# Patient Record
Sex: FEMALE | Race: WHITE | NOT HISPANIC OR LATINO | Employment: OTHER | URBAN - NONMETROPOLITAN AREA
[De-identification: names, ages, dates, MRNs, and addresses within clinical notes are randomized per-mention and may not be internally consistent; named-entity substitution may affect disease eponyms.]

---

## 2017-06-27 ENCOUNTER — TRANSCRIBE ORDERS (OUTPATIENT)
Dept: ADMINISTRATIVE | Facility: HOSPITAL | Age: 67
End: 2017-06-27

## 2017-06-27 DIAGNOSIS — Z13.9 SCREENING: Primary | ICD-10-CM

## 2017-08-07 ENCOUNTER — APPOINTMENT (OUTPATIENT)
Dept: MAMMOGRAPHY | Facility: HOSPITAL | Age: 67
End: 2017-08-07
Attending: INTERNAL MEDICINE

## 2017-08-08 ENCOUNTER — HOSPITAL ENCOUNTER (OUTPATIENT)
Dept: MAMMOGRAPHY | Facility: HOSPITAL | Age: 67
Discharge: HOME | End: 2017-08-08
Attending: INTERNAL MEDICINE | Admitting: INTERNAL MEDICINE

## 2017-08-08 DIAGNOSIS — Z13.9 SCREENING: ICD-10-CM

## 2017-08-08 PROCEDURE — G0202 SCR MAMMO BI INCL CAD: HCPCS

## 2017-08-08 PROCEDURE — 77063 BREAST TOMOSYNTHESIS BI: CPT

## 2017-08-30 ENCOUNTER — HOSPITAL ENCOUNTER (EMERGENCY)
Facility: HOSPITAL | Age: 67
Discharge: HOME | End: 2017-08-30
Attending: EMERGENCY MEDICINE | Admitting: EMERGENCY MEDICINE

## 2017-08-30 VITALS
SYSTOLIC BLOOD PRESSURE: 150 MMHG | TEMPERATURE: 98.4 F | HEART RATE: 87 BPM | RESPIRATION RATE: 20 BRPM | BODY MASS INDEX: 27.97 KG/M2 | WEIGHT: 174 LBS | DIASTOLIC BLOOD PRESSURE: 72 MMHG | OXYGEN SATURATION: 97 % | HEIGHT: 66 IN

## 2017-08-30 DIAGNOSIS — R59.0 CERVICAL LYMPHADENOPATHY: Primary | ICD-10-CM

## 2017-08-30 PROCEDURE — 99283 EMERGENCY DEPT VISIT LOW MDM: CPT

## 2017-08-30 RX ORDER — CEPHALEXIN 500 MG/1
500 CAPSULE ORAL 3 TIMES DAILY
Qty: 21 CAPSULE | Refills: 0 | Status: SHIPPED | OUTPATIENT
Start: 2017-08-30 | End: 2017-09-06

## 2017-08-30 RX ORDER — CEPHALEXIN 250 MG/1
500 CAPSULE ORAL ONCE
Status: COMPLETED | OUTPATIENT
Start: 2017-08-30 | End: 2017-08-30

## 2017-08-30 RX ORDER — ASPIRIN 81 MG/1
81 TABLET, CHEWABLE ORAL DAILY
COMMUNITY

## 2017-08-30 RX ORDER — ATORVASTATIN CALCIUM 10 MG/1
10 TABLET, FILM COATED ORAL DAILY
COMMUNITY
End: 2017-09-11

## 2017-08-30 RX ORDER — CITALOPRAM 10 MG/1
10 TABLET ORAL DAILY
COMMUNITY
End: 2017-11-20 | Stop reason: ALTCHOICE

## 2017-08-30 RX ADMIN — CEPHALEXIN 500 MG: 250 CAPSULE ORAL at 20:27

## 2017-09-07 DIAGNOSIS — R59.1 LYMPHADENOPATHY OF HEAD AND NECK: Primary | ICD-10-CM

## 2017-09-11 ENCOUNTER — OFFICE VISIT (OUTPATIENT)
Dept: SURGERY | Facility: CLINIC | Age: 67
End: 2017-09-11

## 2017-09-11 VITALS
WEIGHT: 177 LBS | HEART RATE: 75 BPM | BODY MASS INDEX: 28.45 KG/M2 | SYSTOLIC BLOOD PRESSURE: 140 MMHG | TEMPERATURE: 97.7 F | HEIGHT: 66 IN | OXYGEN SATURATION: 97 % | DIASTOLIC BLOOD PRESSURE: 78 MMHG

## 2017-09-11 DIAGNOSIS — E04.1 THYROID NODULE: Primary | ICD-10-CM

## 2017-09-11 DIAGNOSIS — E04.1 THYROID NODULE: ICD-10-CM

## 2017-09-11 DIAGNOSIS — N63.0 BREAST MASS: Primary | ICD-10-CM

## 2017-09-11 PROCEDURE — 99204 OFFICE O/P NEW MOD 45 MIN: CPT | Performed by: SURGERY

## 2017-09-11 PROCEDURE — 10022 PR FINE NEEDLE ASP;W/IMAGING GUIDANCE: CPT | Performed by: SURGERY

## 2017-09-11 RX ORDER — CHLORAL HYDRATE 500 MG
CAPSULE ORAL
COMMUNITY
End: 2021-12-06

## 2017-09-11 RX ORDER — ROSUVASTATIN CALCIUM 40 MG/1
40 TABLET, COATED ORAL DAILY
COMMUNITY

## 2017-09-11 RX ORDER — OMEPRAZOLE 20 MG/1
20 CAPSULE, DELAYED RELEASE ORAL DAILY
COMMUNITY
End: 2019-06-07 | Stop reason: HOSPADM

## 2017-09-11 NOTE — PROGRESS NOTES
Patient: Latonya Mei    YOB: 1950    Date: 09/11/2017    Primary Care Provider: Avtar Calzada MD    Reason for Consultation: NECK MASS  Chief complaint:   Chief Complaint   Patient presents with   • Neck Mass     LEFT NECK MASS       Subjective .     History of present illness:  The patient is here today for the evaluation and treatment for a left neck mass. She first noticed a sudden onset of neck swelling a couple of weeks ago.  She went to the ER and they told her it was an enlarged lymph node and put her on antibiotics.  She has not noticed much improvement.  Along with the neck swelling she complains of pressure, pain that radiated to the left ear and left shoulder, sore throat and hoarseness.  Prior to the neck swelling she would feel like she was going to have a muscle spasm in her neck and turning her head to the right would relieve that feeling. Patient also has dense breasts on mammogram, would like to have ultrasound to make sure no underlying masses present.    Review of Systems   Constitutional: Negative for chills, fever and unexpected weight change.   HENT: Positive for sore throat, trouble swallowing and voice change. Negative for hearing loss.    Eyes: Negative for visual disturbance.   Respiratory: Positive for cough and shortness of breath. Negative for apnea, chest tightness and wheezing.    Cardiovascular: Negative for chest pain, palpitations and leg swelling.   Gastrointestinal: Negative for abdominal distention, abdominal pain, anal bleeding, blood in stool, constipation, diarrhea, nausea, rectal pain and vomiting.   Endocrine: Negative for cold intolerance and heat intolerance.   Genitourinary: Negative for difficulty urinating, dysuria and flank pain.   Musculoskeletal: Positive for neck pain and neck stiffness. Negative for back pain and gait problem.   Skin: Negative for color change, rash and wound.   Neurological: Negative for dizziness, syncope, speech  "difficulty, weakness, light-headedness, numbness and headaches.   Hematological: Negative for adenopathy. Does not bruise/bleed easily.   Psychiatric/Behavioral: Negative for confusion. The patient is not nervous/anxious.        Allergies:  Allergies   Allergen Reactions   • Latex Hives     AND ITCHING   • Tuberculin Tests Itching       Medications:    Current Outpatient Prescriptions:   •  aspirin 81 MG chewable tablet, Chew 81 mg Daily., Disp: , Rfl:   •  citalopram (CeleXA) 10 MG tablet, Take 10 mg by mouth Daily., Disp: , Rfl:   •  Omega-3 Fatty Acids (FISH OIL) 1000 MG capsule capsule, Take  by mouth Daily With Breakfast., Disp: , Rfl:   •  omeprazole (priLOSEC) 20 MG capsule, Take 20 mg by mouth Daily., Disp: , Rfl:   •  rosuvastatin (CRESTOR) 40 MG tablet, Take 40 mg by mouth Daily., Disp: , Rfl:   •  VITAMIN D, CHOLECALCIFEROL, PO, Take  by mouth., Disp: , Rfl:     History\"  Past Medical History:   Diagnosis Date   • Arthritis    • Depression    • GERD (gastroesophageal reflux disease)    • Hyperlipidemia        Past Surgical History:   Procedure Laterality Date   • BLADDER SUSPENSION     • HYSTERECTOMY      VAGINAL       Family History   Problem Relation Age of Onset   • Breast cancer Sister    • Breast cancer Other        Social History   Substance Use Topics   • Smoking status: Never Smoker   • Smokeless tobacco: None   • Alcohol use No        Objective     Vital Signs:   /78 (BP Location: Left arm)  Pulse 75  Temp 97.7 °F (36.5 °C) (Temporal Artery )   Ht 66\" (167.6 cm)  Wt 177 lb (80.3 kg)  SpO2 97%  BMI 28.57 kg/m2    Physical Exam:   General Appearance:    Alert, cooperative, in no acute distress   Head:    Normocephalic, without obvious abnormality, atraumatic   Eyes:            Lids and lashes normal, conjunctivae and sclerae normal, no   icterus, no pallor, corneas clear, PERRLA   Ears:    Ears appear intact with no abnormalities noted   Throat:   No oral lesions, no thrush, oral mucosa " moist   Neck:   No adenopathy, supple, trachea midline, no thyromegaly, no   carotid bruit, no JVD.  3 cm left neck mass, moves with swallowing.  Tender.     Lungs:     Clear to auscultation,respirations regular, even and                  unlabored    Heart:    Regular rhythm and normal rate, normal S1 and S2, no            murmur, no gallop, no rub, no click   Chest Wall:    No abnormalities observed   Abdomen:     Normal bowel sounds, no masses, no organomegaly, soft        non-tender, non-distended, no guarding, no rebound                tenderness   Extremities:   Moves all extremities well, no edema, no cyanosis, no             redness   Pulses:   Pulses palpable and equal bilaterally   Skin:   No bleeding, bruising or rash   Lymph nodes:   No palpable adenopathy   Neurologic:   Cranial nerves 2 - 12 grossly intact, sensation intact, DTR       present and equal bilaterally     Results Review:   I reviewed the patient's new clinical results.    Assessment/Plan     1. Breast mass    2. Thyroid nodule        Procedure: FNA left thyroid lobe with ultrasound guidance     I recommend a FNA of the left thyroid lesion. The procedure and risks were clearly explained including bleeding, infection and requirement for re-biopsy and the patient understood these and wishes to proceed.    The patient was brought to the procedure room. Consent and time out were performed. The area was prepped and draped in the usual fashion. 1% lidocaine with epinephrine was infused locally. A 20G needle was used to biopsy the lesion with ultrasound guidance.  Minimal blood loss had occurred and hemostasis had been well controlled with pressure.  The patient tolerated the procedure and there were no complications. We will make a f/u appointment to discuss results.      I discussed the patients findings and my recommendations with patient.  Repeat ultrasound in 3 months of left thyroid.  We will call patient with pathology report and next  week.    Review of Systems was reviewed and confirmed as accurate today.    Electronically signed by Pao John MD  09/11/17      .          Scribed for Pao John MD by Erna Eric. 9/11/2017  2:28 PM

## 2017-09-14 ENCOUNTER — TELEPHONE (OUTPATIENT)
Dept: SURGERY | Facility: CLINIC | Age: 67
End: 2017-09-14

## 2017-09-14 NOTE — TELEPHONE ENCOUNTER
Spoke with Ms. Mei in regards to her recent left thyroid aspiration.  I let her know it showed only blood and there were not any cells in the fluid for diagnosis.  She understood and did not have any questions.  She is going to keep her 3 month follow up appointment scheduled in December and she knows to call if she has any problems between know and then.

## 2017-10-04 ENCOUNTER — OFFICE VISIT (OUTPATIENT)
Dept: SURGERY | Facility: CLINIC | Age: 67
End: 2017-10-04

## 2017-10-04 VITALS
SYSTOLIC BLOOD PRESSURE: 138 MMHG | DIASTOLIC BLOOD PRESSURE: 74 MMHG | WEIGHT: 177 LBS | TEMPERATURE: 97.5 F | HEART RATE: 85 BPM | OXYGEN SATURATION: 98 % | HEIGHT: 66 IN | BODY MASS INDEX: 28.45 KG/M2

## 2017-10-04 DIAGNOSIS — E04.1 THYROID NODULE: Primary | ICD-10-CM

## 2017-10-04 PROCEDURE — 10022 PR FINE NEEDLE ASP;W/IMAGING GUIDANCE: CPT | Performed by: SURGERY

## 2017-10-04 PROCEDURE — 99214 OFFICE O/P EST MOD 30 MIN: CPT | Performed by: SURGERY

## 2017-10-04 RX ORDER — CITALOPRAM 20 MG/1
20 TABLET ORAL DAILY
COMMUNITY
Start: 2017-09-13 | End: 2018-08-17 | Stop reason: ALTCHOICE

## 2017-10-04 NOTE — PROGRESS NOTES
"Patient: Latonya Mei    YOB: 1950    Date: 10/04/2017    Primary Care Provider: Avtar Calzada MD    Chief Complaint:   Chief Complaint   Patient presents with   • Follow-up     Follow up cyst       History: The patient is here for a follow up on her cyst that is increasing in size.  She had an FNA about a month ago on her left thyroid cyst but it has swollen again.  She complains of soreness on her neck, being unable to hear from her left ear, and swelling.    Review of Systems   Constitutional: Negative for chills, fatigue and fever.   Respiratory: Negative for cough.    Cardiovascular: Negative for chest pain.   Gastrointestinal: Negative for abdominal pain, diarrhea, nausea and vomiting.       Vital Signs  /74  Pulse 85  Temp 97.5 °F (36.4 °C) (Temporal Artery )   Ht 66\" (167.6 cm)  Wt 177 lb (80.3 kg)  SpO2 98%  BMI 28.57 kg/m2    Allergies:  Allergies   Allergen Reactions   • Latex Hives     AND ITCHING   • Tuberculin Tests Itching       Medications:    Current Outpatient Prescriptions:   •  aspirin 81 MG chewable tablet, Chew 81 mg Daily., Disp: , Rfl:   •  citalopram (CeleXA) 10 MG tablet, Take 10 mg by mouth Daily., Disp: , Rfl:   •  citalopram (CeleXA) 20 MG tablet, , Disp: , Rfl:   •  Omega-3 Fatty Acids (FISH OIL) 1000 MG capsule capsule, Take  by mouth Daily With Breakfast., Disp: , Rfl:   •  omeprazole (priLOSEC) 20 MG capsule, Take 20 mg by mouth Daily., Disp: , Rfl:   •  rosuvastatin (CRESTOR) 40 MG tablet, Take 40 mg by mouth Daily., Disp: , Rfl:   •  VITAMIN D, CHOLECALCIFEROL, PO, Take  by mouth., Disp: , Rfl:     Physical Exam:   General Appearance:    Alert, cooperative, in no acute distress   Head:    Normocephalic, without obvious abnormality, atraumatic   Lungs:     Clear to auscultation,respirations regular, even and                  unlabored    Heart:    Regular rhythm and normal rate, normal S1 and S2, no            murmur, no gallop, no rub, no click "   Abdomen:     Normal bowel sounds, no masses, no organomegaly, soft        non-tender, non-distended, no guarding, no rebound                tenderness   Extremities:   Moves all extremities well, no edema, no cyanosis, no             redness   Pulses:   Pulses palpable and equal bilaterally   Skin:   No bleeding, bruising or rash      Assessment/Plan     1. Thyroid nodule      Procedure:FNA left thyroid cyst with ultrasound guidance    I recommend a FNA of the left thyroid lesion. The procedure and risks were clearly explained including bleeding, infection and requirement for re-biopsy and the patient understood these and wishes to proceed.    The patient was brought to the procedure room. Consent and time out were performed. The area was prepped and draped in the usual fashion. 1% lidocaine with epinephrine was infused locally. A 20G needle was used to biopsy the lesion with ultrasound guidance.  Minimal blood loss had occurred and hemostasis had been well controlled with pressure.  The patient tolerated the procedure and there were no complications. We will make a f/u appointment to discuss results.    Follow-up 6 months for repeat ultrasound.  If this recurs sooner, she will follow up sooner for drainage.    Electronically signed by Pao John MD  10/04/17    Scribed for Pao John MD by Michelle Milligan. 10/4/2017  11:39 AM

## 2017-11-10 ENCOUNTER — OFFICE VISIT (OUTPATIENT)
Dept: SURGERY | Facility: CLINIC | Age: 67
End: 2017-11-10

## 2017-11-10 VITALS
WEIGHT: 177 LBS | SYSTOLIC BLOOD PRESSURE: 136 MMHG | BODY MASS INDEX: 28.45 KG/M2 | HEART RATE: 78 BPM | TEMPERATURE: 97.8 F | HEIGHT: 66 IN | OXYGEN SATURATION: 97 % | DIASTOLIC BLOOD PRESSURE: 70 MMHG

## 2017-11-10 DIAGNOSIS — E04.1 THYROID CYST: Primary | ICD-10-CM

## 2017-11-10 DIAGNOSIS — E04.1 THYROID NODULE: Primary | ICD-10-CM

## 2017-11-10 PROCEDURE — 99214 OFFICE O/P EST MOD 30 MIN: CPT | Performed by: SURGERY

## 2017-11-10 PROCEDURE — 10022 PR FINE NEEDLE ASP;W/IMAGING GUIDANCE: CPT | Performed by: SURGERY

## 2017-11-10 NOTE — PROGRESS NOTES
"Patient: Latonya Mei    YOB: 1950    Date: 11/10/2017    Primary Care Provider: Avtar Calzada MD    Chief Complaint:   Chief Complaint   Patient presents with   • Follow-up     thyroid nodule       History: I saw the patient in the office today for a follow up of her left thyroid lesion. She had an FNA done on 10/04/17. She states it started filling back up again shortly after. She complains of difficulty swallowing. She denies pain and skin discoloration.Lesion has recurred 3 times the last 3 months, hemorrhagic in nature.Symptomatic with hoarseness, difficulty swallowing and pressure when she lays flat.  She comes in today for FNA and to schedule excision of the left thyroid nodule.    Review of Systems   Constitutional: Negative for chills, fatigue and fever.   HENT: Positive for trouble swallowing.    Respiratory: Negative for cough.    Cardiovascular: Negative for chest pain.   Gastrointestinal: Negative for abdominal pain, diarrhea, nausea and vomiting.       Vital Signs  /70  Pulse 78  Temp 97.8 °F (36.6 °C) (Temporal Artery )   Ht 66\" (167.6 cm)  Wt 177 lb (80.3 kg)  SpO2 97%  BMI 28.57 kg/m2    Allergies:  Allergies   Allergen Reactions   • Latex Hives     AND ITCHING   • Tuberculin Tests Itching       Medications:    Current Outpatient Prescriptions:   •  aspirin 81 MG chewable tablet, Chew 81 mg Daily., Disp: , Rfl:   •  citalopram (CeleXA) 10 MG tablet, Take 10 mg by mouth Daily., Disp: , Rfl:   •  citalopram (CeleXA) 20 MG tablet, , Disp: , Rfl:   •  Omega-3 Fatty Acids (FISH OIL) 1000 MG capsule capsule, Take  by mouth Daily With Breakfast., Disp: , Rfl:   •  omeprazole (priLOSEC) 20 MG capsule, Take 20 mg by mouth Daily., Disp: , Rfl:   •  rosuvastatin (CRESTOR) 40 MG tablet, Take 40 mg by mouth Daily., Disp: , Rfl:   •  VITAMIN D, CHOLECALCIFEROL, PO, Take  by mouth., Disp: , Rfl:     Physical Exam:   General Appearance:    Alert, cooperative, in no acute " distress   Head:    Normocephalic, without obvious abnormality, atraumatic   Lungs:     Clear to auscultation,respirations regular, even and                  unlabored    Heart:    Regular rhythm and normal rate, normal S1 and S2, no            murmur, no gallop, no rub, no click   Abdomen:     Normal bowel sounds, no masses, no organomegaly, soft        non-tender, non-distended, no guarding, no rebound                tenderness   Extremities:   Moves all extremities well, no edema, no cyanosis, no             redness   Pulses:   Pulses palpable and equal bilaterally   Skin:   No bleeding, bruising or rash      Assessment/Plan     1. Thyroid nodule      Procedure: FNA left thyroid cyst with ultrasound guidance    I recommend a FNA of the left thyroid lesion. The procedure and risks were clearly explained including bleeding, infection and requirement for re-biopsy and the patient understood these and wishes to proceed.    The patient was brought to the procedure room. Consent and time out were performed. The area was prepped and draped in the usual fashion. 1% lidocaine with epinephrine was infused locally. A 20G needle was used to biopsy the lesion with ultrasound guidance.  Minimal blood loss had occurred and hemostasis had been well controlled with pressure.  The patient tolerated the procedure and there were no complications. We will make a f/u appointment to discuss results.      Patient scheduled for left thyroid lobectomy for recurrent hemorrhagic left thyroid cyst.  Symptomatic with dysphagia, shortness of breath and hoarseness.  Risk of bleeding, infection and possible recurrent laryngeal nerve injury discussed and patient agreeable.    Electronically signed by Pao John MD  11/10/17    Scribed for Pao John MD by Anisa Soto. 11/10/2017  3:51 PM

## 2017-11-13 ENCOUNTER — TELEPHONE (OUTPATIENT)
Dept: SURGERY | Facility: CLINIC | Age: 67
End: 2017-11-13

## 2017-11-13 NOTE — TELEPHONE ENCOUNTER
Informed patient that her FNA of the thyroid was negative. She understood and did not have any questions.

## 2017-11-14 ENCOUNTER — TRANSCRIBE ORDERS (OUTPATIENT)
Dept: ENDOCRINOLOGY | Facility: CLINIC | Age: 67
End: 2017-11-14

## 2017-11-14 DIAGNOSIS — E04.1 THYROID NODULE: Primary | ICD-10-CM

## 2017-11-20 ENCOUNTER — OFFICE VISIT (OUTPATIENT)
Dept: ENDOCRINOLOGY | Facility: CLINIC | Age: 67
End: 2017-11-20

## 2017-11-20 VITALS
HEIGHT: 66 IN | BODY MASS INDEX: 28.77 KG/M2 | HEART RATE: 70 BPM | SYSTOLIC BLOOD PRESSURE: 136 MMHG | OXYGEN SATURATION: 98 % | WEIGHT: 179 LBS | DIASTOLIC BLOOD PRESSURE: 80 MMHG

## 2017-11-20 DIAGNOSIS — E04.1 THYROID NODULE: Primary | ICD-10-CM

## 2017-11-20 LAB
ALBUMIN SERPL-MCNC: 4.3 G/DL (ref 3.2–4.8)
ALBUMIN/GLOB SERPL: 1.9 G/DL (ref 1.5–2.5)
ALP SERPL-CCNC: 59 U/L (ref 25–100)
ALT SERPL W P-5'-P-CCNC: 17 U/L (ref 7–40)
ANION GAP SERPL CALCULATED.3IONS-SCNC: 3 MMOL/L (ref 3–11)
AST SERPL-CCNC: 17 U/L (ref 0–33)
BASOPHILS # BLD AUTO: 0.03 10*3/MM3 (ref 0–0.2)
BASOPHILS NFR BLD AUTO: 0.5 % (ref 0–1)
BILIRUB SERPL-MCNC: 0.7 MG/DL (ref 0.3–1.2)
BUN BLD-MCNC: 16 MG/DL (ref 9–23)
BUN/CREAT SERPL: 17.8 (ref 7–25)
CALCIUM SPEC-SCNC: 9.2 MG/DL (ref 8.7–10.4)
CHLORIDE SERPL-SCNC: 107 MMOL/L (ref 99–109)
CO2 SERPL-SCNC: 31 MMOL/L (ref 20–31)
CREAT BLD-MCNC: 0.9 MG/DL (ref 0.6–1.3)
DEPRECATED RDW RBC AUTO: 46.7 FL (ref 37–54)
EOSINOPHIL # BLD AUTO: 0.11 10*3/MM3 (ref 0–0.3)
EOSINOPHIL NFR BLD AUTO: 1.7 % (ref 0–3)
ERYTHROCYTE [DISTWIDTH] IN BLOOD BY AUTOMATED COUNT: 13.5 % (ref 11.3–14.5)
GFR SERPL CREATININE-BSD FRML MDRD: 62 ML/MIN/1.73
GLOBULIN UR ELPH-MCNC: 2.3 GM/DL
GLUCOSE BLD-MCNC: 138 MG/DL (ref 70–100)
HCT VFR BLD AUTO: 39.9 % (ref 34.5–44)
HGB BLD-MCNC: 12.8 G/DL (ref 11.5–15.5)
IMM GRANULOCYTES # BLD: 0.01 10*3/MM3 (ref 0–0.03)
IMM GRANULOCYTES NFR BLD: 0.2 % (ref 0–0.6)
LYMPHOCYTES # BLD AUTO: 2.03 10*3/MM3 (ref 0.6–4.8)
LYMPHOCYTES NFR BLD AUTO: 32.3 % (ref 24–44)
MCH RBC QN AUTO: 30.1 PG (ref 27–31)
MCHC RBC AUTO-ENTMCNC: 32.1 G/DL (ref 32–36)
MCV RBC AUTO: 93.9 FL (ref 80–99)
MONOCYTES # BLD AUTO: 0.49 10*3/MM3 (ref 0–1)
MONOCYTES NFR BLD AUTO: 7.8 % (ref 0–12)
NEUTROPHILS # BLD AUTO: 3.62 10*3/MM3 (ref 1.5–8.3)
NEUTROPHILS NFR BLD AUTO: 57.5 % (ref 41–71)
PLATELET # BLD AUTO: 251 10*3/MM3 (ref 150–450)
PMV BLD AUTO: 10.6 FL (ref 6–12)
POTASSIUM BLD-SCNC: 5.1 MMOL/L (ref 3.5–5.5)
PROT SERPL-MCNC: 6.6 G/DL (ref 5.7–8.2)
RBC # BLD AUTO: 4.25 10*6/MM3 (ref 3.89–5.14)
SODIUM BLD-SCNC: 141 MMOL/L (ref 132–146)
TSH SERPL DL<=0.05 MIU/L-ACNC: 2.83 MIU/ML (ref 0.35–5.35)
WBC NRBC COR # BLD: 6.29 10*3/MM3 (ref 3.5–10.8)

## 2017-11-20 PROCEDURE — 99204 OFFICE O/P NEW MOD 45 MIN: CPT | Performed by: INTERNAL MEDICINE

## 2017-11-20 PROCEDURE — 80053 COMPREHEN METABOLIC PANEL: CPT | Performed by: INTERNAL MEDICINE

## 2017-11-20 PROCEDURE — 85025 COMPLETE CBC W/AUTO DIFF WBC: CPT | Performed by: INTERNAL MEDICINE

## 2017-11-20 PROCEDURE — 84443 ASSAY THYROID STIM HORMONE: CPT | Performed by: INTERNAL MEDICINE

## 2017-11-20 NOTE — PROGRESS NOTES
Thyroid Problem (New pt for cysts on thyroid. Would like to discuss surgery options)    Subjective    Latonya Mei is a 67 y.o. female. she is being seen for consultation today at the request of  Avtar Calzada MD   Patient presented for second opinion regarding thyroid surgery. Patient had fine-needle aspiration of the thyroid nodule in 9/11/2017, 10/04/2017 and 11/07/2017. The cytology showed hemorrhagic cystic fluid, which recollected several times.     Left thyroid lobectomy was recommended for the cystic nodule in the left thyroid measured 4 x 3 cm cytology from 09/11/2017 showed nondiagnostic specimen with only block present and no follicular cells.  Patient would like to establish endocrinology care and hear a second opinion about the surgery indications.   She has reported that swallowing problem associated with the nodule, hoarseness of the voice, likely seasonal allergies.     She does not have a history of radiation treatment, no family history of thyroid disease or thyroid cancer. Several of her sisters have breast cancer, also lung cancer and brain cancer in the family.  Her thyroid function was normal by a last evaluation. No thyroid labs within a year    History of Present Illness    Review of Systems  Review of Systems   Constitutional: Positive for fatigue and unexpected weight change (weight gain). Negative for activity change, appetite change, chills and diaphoresis.   HENT: Positive for sore throat (increased after 3d Cyst aspiration), trouble swallowing and voice change (Hoarseness). Negative for congestion, ear pain, facial swelling, hearing loss and postnasal drip.    Eyes: Negative.  Negative for redness, itching and visual disturbance.   Respiratory: Negative for cough and shortness of breath.    Cardiovascular: Negative for chest pain, palpitations and leg swelling.   Gastrointestinal: Negative for abdominal distention, abdominal pain, constipation, diarrhea, nausea and vomiting.    Endocrine:        As listed in HPI   Genitourinary: Negative.    Musculoskeletal: Positive for arthralgias. Negative for back pain, joint swelling, myalgias, neck pain and neck stiffness.   Skin: Negative.    Allergic/Immunologic: Negative.    Neurological: Positive for numbness. Negative for dizziness, tremors, syncope, weakness, light-headedness and headaches.   Hematological: Positive for adenopathy.   Psychiatric/Behavioral: Negative.    All other systems reviewed and are negative.    Current medications:  Current Outpatient Prescriptions   Medication Sig Dispense Refill   • aspirin 81 MG chewable tablet Chew 81 mg Daily.     • citalopram (CeleXA) 20 MG tablet      • Omega-3 Fatty Acids (FISH OIL) 1000 MG capsule capsule Take  by mouth Daily With Breakfast.     • omeprazole (priLOSEC) 20 MG capsule Take 20 mg by mouth Daily.     • rosuvastatin (CRESTOR) 40 MG tablet Take 40 mg by mouth Daily.     • VITAMIN D, CHOLECALCIFEROL, PO Take  by mouth.       No current facility-administered medications for this visit.        The following portions of the patient's history were reviewed and updated as appropriate: She  has a past medical history of Arthritis; Arthritis; Depression; Diverticulitis; Fibromyalgia; GERD (gastroesophageal reflux disease); Hyperlipidemia; and Scoliosis.  She  has a past surgical history that includes Hysterectomy; Bladder suspension; Tubal ligation; and Carpal tunnel release.  Her family history includes Arthritis in her father, mother, and sister; Brain cancer in her brother; Breast cancer in her other and sister; Cancer in her sister; Diabetes in her brother and sister; Heart attack in her mother; Lung cancer in her father; Osteoporosis in her mother; Prostate cancer in her father.  She  reports that she has never smoked. She does not have any smokeless tobacco history on file. She reports that she does not drink alcohol or use illicit drugs.  She is allergic to latex and tuberculin  "tests..        Objective      Vitals:    11/20/17 0815   BP: 136/80   Pulse: 70   SpO2: 98%   Weight: 179 lb (81.2 kg)   Height: 66\" (167.6 cm)   Body mass index is 28.89 kg/(m^2).  Physical Exam   Constitutional: She is oriented to person, place, and time. She appears well-developed and well-nourished.   HENT:   Head: Normocephalic and atraumatic.   Mouth/Throat: Oropharynx is clear and moist.   Eyes: Conjunctivae are normal.   Neck: Normal range of motion. No muscular tenderness present. Carotid bruit is not present. Thyromegaly (left 3-4 cm soft thyroid nodule palpated ) present. No thyroid mass present.   The neck is supple and no assimetry visualized   Cardiovascular: Normal rate, regular rhythm and normal heart sounds.    Pulmonary/Chest: Effort normal and breath sounds normal.   Musculoskeletal: She exhibits no edema.   Lymphadenopathy:     She has no cervical adenopathy.   Neurological: She is alert and oriented to person, place, and time.   Skin: Skin is warm. No rash noted.   Psychiatric: She has a normal mood and affect. Thought content normal.   Vitals reviewed.      LABS AND IMAGING      Assessment/Plan       Problem List Items Addressed This Visit        Endocrine    Thyroid nodule - Primary    Relevant Orders    TSH    Comprehensive Metabolic Panel    CBC Auto Differential             PLAN  1. Left thyroid cyst - bedside ultrasound revealed simple cyst. No abnormal intranodular flow on Doppler. She has a small 4 x 6 mm l/node in the level 3 on the left, likely reactive l/node. Right thyroid lobe has normal echogenicity with 2 x 3 mm cystic nodule.     2. diagnosis was discussed with the patient, and her questions were answered. I agree with dr Cary that Left thyroid lobectomy is the best approach. Cytology didn't reveal atypical cells and u/s picture is consistent with the same.  I have gone over the postoperative care. Most likely she will not require levothyroxine replacement as her right lobe " appears healthy. Also discussed the need for completion thyroidectomy in the event of cancerous cells on surgical pathology.     3. I will see patient in follow-up 6-8 weeks after the surgery and will make recommendations regarding the need for levothyroxine replacement.     4. Test and referrals ordered:  Orders Placed This Encounter   Procedures   • TSH   • Comprehensive Metabolic Panel   • CBC Auto Differential       Follow-up:  2 months    31     min  of  45 min face-to-face visit time spent for coordination of care and counselling regarding identified problems as outlined in the objective, assessment and discussion portions of the documentation.

## 2017-11-27 ENCOUNTER — APPOINTMENT (OUTPATIENT)
Dept: PREADMISSION TESTING | Facility: HOSPITAL | Age: 67
End: 2017-11-27

## 2017-11-27 VITALS — HEIGHT: 66 IN | BODY MASS INDEX: 28.61 KG/M2 | WEIGHT: 178 LBS

## 2017-11-27 PROCEDURE — 93005 ELECTROCARDIOGRAM TRACING: CPT | Performed by: SURGERY

## 2017-12-05 ENCOUNTER — ANESTHESIA EVENT (OUTPATIENT)
Dept: PERIOP | Facility: HOSPITAL | Age: 67
End: 2017-12-05

## 2017-12-06 ENCOUNTER — ANESTHESIA (OUTPATIENT)
Dept: PERIOP | Facility: HOSPITAL | Age: 67
End: 2017-12-06

## 2017-12-06 ENCOUNTER — HOSPITAL ENCOUNTER (OUTPATIENT)
Facility: HOSPITAL | Age: 67
Setting detail: HOSPITAL OUTPATIENT SURGERY
Discharge: HOME | End: 2017-12-06
Attending: SURGERY | Admitting: SURGERY

## 2017-12-06 VITALS
OXYGEN SATURATION: 95 % | TEMPERATURE: 98.2 F | RESPIRATION RATE: 18 BRPM | HEART RATE: 77 BPM | DIASTOLIC BLOOD PRESSURE: 65 MMHG | SYSTOLIC BLOOD PRESSURE: 155 MMHG

## 2017-12-06 DIAGNOSIS — E04.1 THYROID NODULE: ICD-10-CM

## 2017-12-06 PROCEDURE — 25010000003 CEFAZOLIN PER 500 MG: Performed by: SURGERY

## 2017-12-06 PROCEDURE — 88307 TISSUE EXAM BY PATHOLOGIST: CPT | Performed by: SURGERY

## 2017-12-06 PROCEDURE — 25010000002 ONDANSETRON PER 1 MG: Performed by: NURSE ANESTHETIST, CERTIFIED REGISTERED

## 2017-12-06 PROCEDURE — 25010000002 SUCCINYLCHOLINE PER 20 MG: Performed by: NURSE ANESTHETIST, CERTIFIED REGISTERED

## 2017-12-06 PROCEDURE — 25010000002 MIDAZOLAM PER 1 MG: Performed by: NURSE ANESTHETIST, CERTIFIED REGISTERED

## 2017-12-06 PROCEDURE — 25010000002 FENTANYL CITRATE (PF) 250 MCG/5ML SOLUTION: Performed by: NURSE ANESTHETIST, CERTIFIED REGISTERED

## 2017-12-06 PROCEDURE — 25010000002 DEXAMETHASONE PER 1 MG: Performed by: NURSE ANESTHETIST, CERTIFIED REGISTERED

## 2017-12-06 PROCEDURE — 60220 PARTIAL REMOVAL OF THYROID: CPT | Performed by: SURGERY

## 2017-12-06 RX ORDER — FENTANYL CITRATE 50 UG/ML
INJECTION, SOLUTION INTRAMUSCULAR; INTRAVENOUS AS NEEDED
Status: DISCONTINUED | OUTPATIENT
Start: 2017-12-06 | End: 2017-12-06 | Stop reason: SURG

## 2017-12-06 RX ORDER — ATROPINE SULFATE 0.4 MG/ML
AMPUL (ML) INJECTION AS NEEDED
Status: DISCONTINUED | OUTPATIENT
Start: 2017-12-06 | End: 2017-12-06 | Stop reason: SURG

## 2017-12-06 RX ORDER — MEPERIDINE HYDROCHLORIDE 25 MG/ML
25 INJECTION INTRAMUSCULAR; INTRAVENOUS; SUBCUTANEOUS
Status: DISCONTINUED | OUTPATIENT
Start: 2017-12-06 | End: 2017-12-06 | Stop reason: HOSPADM

## 2017-12-06 RX ORDER — MIDAZOLAM HYDROCHLORIDE 1 MG/ML
INJECTION INTRAMUSCULAR; INTRAVENOUS AS NEEDED
Status: DISCONTINUED | OUTPATIENT
Start: 2017-12-06 | End: 2017-12-06 | Stop reason: SURG

## 2017-12-06 RX ORDER — SODIUM CHLORIDE, SODIUM LACTATE, POTASSIUM CHLORIDE, CALCIUM CHLORIDE 600; 310; 30; 20 MG/100ML; MG/100ML; MG/100ML; MG/100ML
1000 INJECTION, SOLUTION INTRAVENOUS CONTINUOUS PRN
Status: DISCONTINUED | OUTPATIENT
Start: 2017-12-06 | End: 2017-12-06 | Stop reason: HOSPADM

## 2017-12-06 RX ORDER — MEPERIDINE HYDROCHLORIDE 50 MG/ML
INJECTION INTRAMUSCULAR; INTRAVENOUS; SUBCUTANEOUS AS NEEDED
Status: DISCONTINUED | OUTPATIENT
Start: 2017-12-06 | End: 2017-12-06 | Stop reason: SURG

## 2017-12-06 RX ORDER — ONDANSETRON 2 MG/ML
4 INJECTION INTRAMUSCULAR; INTRAVENOUS ONCE
Status: DISCONTINUED | OUTPATIENT
Start: 2017-12-06 | End: 2017-12-06 | Stop reason: HOSPADM

## 2017-12-06 RX ORDER — SUCCINYLCHOLINE CHLORIDE 20 MG/ML
INJECTION INTRAMUSCULAR; INTRAVENOUS AS NEEDED
Status: DISCONTINUED | OUTPATIENT
Start: 2017-12-06 | End: 2017-12-06 | Stop reason: SURG

## 2017-12-06 RX ORDER — PROMETHAZINE HYDROCHLORIDE 25 MG/ML
6.25 INJECTION, SOLUTION INTRAMUSCULAR; INTRAVENOUS ONCE
Status: DISCONTINUED | OUTPATIENT
Start: 2017-12-06 | End: 2017-12-06 | Stop reason: HOSPADM

## 2017-12-06 RX ORDER — HYDROCODONE BITARTRATE AND ACETAMINOPHEN 5; 325 MG/1; MG/1
1-2 TABLET ORAL EVERY 4 HOURS PRN
Qty: 20 TABLET | Refills: 0 | Status: SHIPPED | OUTPATIENT
Start: 2017-12-06 | End: 2018-02-02

## 2017-12-06 RX ORDER — ACETAMINOPHEN 325 MG/1
650 TABLET ORAL ONCE
Status: COMPLETED | OUTPATIENT
Start: 2017-12-06 | End: 2017-12-06

## 2017-12-06 RX ORDER — SODIUM CHLORIDE 0.9 % (FLUSH) 0.9 %
3 SYRINGE (ML) INJECTION AS NEEDED
Status: DISCONTINUED | OUTPATIENT
Start: 2017-12-06 | End: 2017-12-06 | Stop reason: HOSPADM

## 2017-12-06 RX ORDER — GLYCOPYRROLATE 0.2 MG/ML
INJECTION INTRAMUSCULAR; INTRAVENOUS AS NEEDED
Status: DISCONTINUED | OUTPATIENT
Start: 2017-12-06 | End: 2017-12-06 | Stop reason: SURG

## 2017-12-06 RX ORDER — ACETAMINOPHEN 325 MG/1
TABLET ORAL
Status: COMPLETED
Start: 2017-12-06 | End: 2017-12-06

## 2017-12-06 RX ORDER — OXYCODONE AND ACETAMINOPHEN 10; 325 MG/1; MG/1
1 TABLET ORAL ONCE
Status: COMPLETED | OUTPATIENT
Start: 2017-12-06 | End: 2017-12-06

## 2017-12-06 RX ORDER — DEXAMETHASONE SODIUM PHOSPHATE 4 MG/ML
INJECTION, SOLUTION INTRA-ARTICULAR; INTRALESIONAL; INTRAMUSCULAR; INTRAVENOUS; SOFT TISSUE AS NEEDED
Status: DISCONTINUED | OUTPATIENT
Start: 2017-12-06 | End: 2017-12-06 | Stop reason: SURG

## 2017-12-06 RX ORDER — OXYCODONE AND ACETAMINOPHEN 10; 325 MG/1; MG/1
TABLET ORAL
Status: COMPLETED
Start: 2017-12-06 | End: 2017-12-06

## 2017-12-06 RX ORDER — ROCURONIUM BROMIDE 10 MG/ML
INJECTION, SOLUTION INTRAVENOUS AS NEEDED
Status: DISCONTINUED | OUTPATIENT
Start: 2017-12-06 | End: 2017-12-06 | Stop reason: SURG

## 2017-12-06 RX ORDER — ONDANSETRON 2 MG/ML
INJECTION INTRAMUSCULAR; INTRAVENOUS AS NEEDED
Status: DISCONTINUED | OUTPATIENT
Start: 2017-12-06 | End: 2017-12-06 | Stop reason: SURG

## 2017-12-06 RX ADMIN — OXYCODONE HYDROCHLORIDE AND ACETAMINOPHEN 1 TABLET: 10; 325 TABLET ORAL at 07:06

## 2017-12-06 RX ADMIN — MIDAZOLAM HYDROCHLORIDE 2 MG: 1 INJECTION, SOLUTION INTRAMUSCULAR; INTRAVENOUS at 07:17

## 2017-12-06 RX ADMIN — CEFAZOLIN SODIUM 2 G: 2 SOLUTION INTRAVENOUS at 07:17

## 2017-12-06 RX ADMIN — DEXAMETHASONE SODIUM PHOSPHATE 4 MG: 4 INJECTION, SOLUTION INTRAMUSCULAR; INTRAVENOUS at 07:54

## 2017-12-06 RX ADMIN — ACETAMINOPHEN 650 MG: 325 TABLET, FILM COATED ORAL at 07:05

## 2017-12-06 RX ADMIN — SODIUM CHLORIDE, POTASSIUM CHLORIDE, SODIUM LACTATE AND CALCIUM CHLORIDE: 600; 310; 30; 20 INJECTION, SOLUTION INTRAVENOUS at 08:05

## 2017-12-06 RX ADMIN — FENTANYL CITRATE 100 MCG: 50 INJECTION, SOLUTION INTRAMUSCULAR; INTRAVENOUS at 07:33

## 2017-12-06 RX ADMIN — SUCCINYLCHOLINE CHLORIDE 80 MG: 20 INJECTION, SOLUTION INTRAMUSCULAR; INTRAVENOUS at 07:33

## 2017-12-06 RX ADMIN — FENTANYL CITRATE 50 MCG: 50 INJECTION, SOLUTION INTRAMUSCULAR; INTRAVENOUS at 07:45

## 2017-12-06 RX ADMIN — OXYCODONE AND ACETAMINOPHEN 1 TABLET: 10; 325 TABLET ORAL at 07:06

## 2017-12-06 RX ADMIN — ACETAMINOPHEN 650 MG: 325 TABLET ORAL at 07:05

## 2017-12-06 RX ADMIN — FENTANYL CITRATE 25 MCG: 50 INJECTION, SOLUTION INTRAMUSCULAR; INTRAVENOUS at 08:06

## 2017-12-06 RX ADMIN — ATROPINE SULFATE 0.4 MG: 0.4 INJECTION, SOLUTION INTRAMUSCULAR; INTRAVENOUS; SUBCUTANEOUS at 07:50

## 2017-12-06 RX ADMIN — ONDANSETRON 4 MG: 2 INJECTION INTRAMUSCULAR; INTRAVENOUS at 07:54

## 2017-12-06 RX ADMIN — MEPERIDINE HYDROCHLORIDE 25 MG: 50 INJECTION INTRAMUSCULAR; INTRAVENOUS; SUBCUTANEOUS at 08:30

## 2017-12-06 RX ADMIN — SODIUM CHLORIDE, POTASSIUM CHLORIDE, SODIUM LACTATE AND CALCIUM CHLORIDE 1000 ML: 600; 310; 30; 20 INJECTION, SOLUTION INTRAVENOUS at 07:00

## 2017-12-06 RX ADMIN — ROCURONIUM BROMIDE 5 MG: 10 INJECTION INTRAVENOUS at 07:33

## 2017-12-06 RX ADMIN — GLYCOPYRROLATE 0.2 MG: 0.2 INJECTION, SOLUTION INTRAMUSCULAR; INTRAVENOUS at 07:47

## 2017-12-06 RX ADMIN — FENTANYL CITRATE 25 MCG: 50 INJECTION, SOLUTION INTRAMUSCULAR; INTRAVENOUS at 08:00

## 2017-12-06 NOTE — ANESTHESIA PROCEDURE NOTES
Airway  Urgency: elective    Airway not difficult    General Information and Staff    Patient location during procedure: OR  CRNA: BIBI BISHOP    Indications and Patient Condition  Indications for airway management: airway protection    Preoxygenated: yes  Mask difficulty assessment: 1 - vent by mask    Final Airway Details  Final airway type: endotracheal airway      Successful airway: ETT  Cuffed: yes   Successful intubation technique: direct laryngoscopy  Facilitating devices/methods: intubating stylet  Endotracheal tube insertion site: oral  Blade: Cortez  Blade size: #2  ETT size: 7.0 mm  Cormack-Lehane Classification: grade I - full view of glottis  Placement verified by: chest auscultation and capnometry   Measured from: lips  ETT to lips (cm): 22  Number of attempts at approach: 1    Additional Comments  Airway placed without problems. Dentition and Lips as pre-induction. ETT cuff inflated to minimal occlusive pressure.

## 2017-12-06 NOTE — PLAN OF CARE
Problem: Patient Care Overview (Adult)  Goal: Plan of Care Review  Outcome: Ongoing (interventions implemented as appropriate)    12/06/17 1001   Outcome Evaluation   Outcome Summary/Follow up Plan pacu discharge criteria met.

## 2017-12-06 NOTE — NURSING NOTE
0840- TRACH TRAY WITH PT TO PACU, SPEECH CLEAR. DRSG CD/I, NACK WITHOUT SWELLING NOTED.  0900- DR. STEIN INTO PACU SPOKE WITH PT. DENIES COMPLAINTS.

## 2017-12-06 NOTE — PLAN OF CARE
Problem: Perioperative Period (Adult)  Goal: Signs and Symptoms of Listed Potential Problems Will be Absent or Manageable (Perioperative Period)  Outcome: Ongoing (interventions implemented as appropriate)    12/06/17 0644   Perioperative Period   Problems Assessed (Perioperative Period) all   Problems Present (Perioperative Period) none

## 2017-12-06 NOTE — ANESTHESIA PREPROCEDURE EVALUATION
Anesthesia Evaluation     Patient summary reviewed and Nursing notes reviewed   no history of anesthetic complications:  NPO Solid Status: > 8 hours  NPO Liquid Status: > 8 hours     Airway   Mallampati: I  TM distance: >3 FB  Neck ROM: full  no difficulty expected  Dental - normal exam   (+) upper dentures and lower dentures    Pulmonary - negative pulmonary ROS and normal exam    breath sounds clear to auscultation  (-) not a smoker  Cardiovascular - normal exam    ECG reviewed  Rhythm: regular  Rate: normal    (+) hyperlipidemia    ROS comment: EKG NSR 72, Normal    Neuro/Psych  (+) psychiatric history Depression and Anxiety,    GI/Hepatic/Renal/Endo    (+)  GERD, PUD (Hx ulcers),     Musculoskeletal     (+) arthralgias (Fibromyalgia),   Abdominal    Substance History - negative use     OB/GYN negative ob/gyn ROS         Other   (+) arthritis                                     Anesthesia Plan    ASA 3     general   (Risks and benefits of general anesthesia discussed including risk of aspiration, recall and dental damage. All patient questions answered.  Patient told a breathing tube will be used to manage the airway.    Will continue with plan of care.)  intravenous induction   Anesthetic plan and risks discussed with patient.

## 2017-12-06 NOTE — PLAN OF CARE
Problem: Perioperative Period (Adult)  Intervention: Promote Pulmonary Hygiene and Secretion Clearance    12/06/17 0854   Promote Aggressive Pulmonary Hygiene/Secretion Management   Cough And Deep Breathing done independently per patient   Positioning   Head Of Bed (HOB) Position HOB at 30 degrees       Intervention: Monitor/Manage Pain    12/06/17 0854   Safety Interventions   Medication Review/Management medications reviewed   Manage Acute Burn Pain   Pain Management Interventions cold applied       Intervention: Monitor/Manage Postoperative Bleeding    12/06/17 0854   Safety Interventions   Bleeding Management dressing monitored       Intervention: Promote Normothermia    12/06/17 0840   Cardiac Interventions   Warming Thermoregulation Maintenance warm blankets applied

## 2017-12-06 NOTE — OP NOTE
PATIENT:    Latonya Mei    DATE OF SURGERY:  12/6/2017    PHYSICIAN:    Pao John MD    REFERRING PHYSICIAN:  Avtar Calzada MD    YOB: 1950    PREOPERATIVE DIAGNOSIS:  Recurrent left thyroid cyst with solid thyroid mass    POSTOPERATIVE DIAGNOSIS:  Same    PROCEDURE:  Left thyroid lobectomy    INDICATIONS:  The patient was sent to me as a consultation by Avtar Calzada MD   for evaluation and treatment of a history significant for recurrent left thyroid cyst and mass, increasing in size and not resolving. They are here now today for left thyroid lobectomy.    ANESTHESIA:  General Anesthesia     OPERATIVE PROCEDURE:  The patient was taken to the operating room, placed in the supine position, and given general endotracheal anesthesia.  They were prepped and draped in the normal sterile fashion.  They did receive preoperative IV antibiotics.  The nursing staff did perform intraoperative timeout prior to the incision.  I did aretha the patient myself preoperatively.    Incision made 2 fingerbreadths above the sternal notch.  The platysma was divided and  the superior and inferior flaps were created.  The strap muscles were divided in the midline.  Identified the left thyroid nodule cyst.  I mobilized completely dividing the inferior thyroid artery and vein with Harmonic scalpel.  I then divided the middle thyroid vein with Harmonic scalpel and superior thyroid vein and artery with a Harmonic scalpel as well.  Once the control on the vessel to mobilize entire left thyroid lobe and divided the isthmus.  Specimen sent to pathology.  All bleeding was controlled with either clips or cautery.  Once it was dry and no further bleeding, closed the strap muscles with a 3-0 silk suture.  Platysma with 4-0 Vicryl and skin with 4-0 Vicryl dressing applied, no complications.  The vocal cords were both visualized after the procedure and were opening.    The patient was stable at this point in time  and subsequently transferred back to the recovery room in stable condition.           MARQUITA John MD

## 2017-12-06 NOTE — ANESTHESIA POSTPROCEDURE EVALUATION
Patient: Latonya Mei    Procedure Summary     Date Anesthesia Start Anesthesia Stop Room / Location    12/06/17 0722 0844 [facility] OR 2 /  [facility] OR       Procedure Diagnosis Surgeon Provider    THYROIDECTOMY LEFT (Left Neck) Thyroid nodule  (Thyroid nodule [E04.1]) MD Juma Greenberg CRNA          Anesthesia Type: general  Last vitals  /61   Temp 97.9   Pulse 132     Resp 14   SpO2 99       Post Anesthesia Care and Evaluation    Patient location during evaluation: PACU  Patient participation: complete - patient participated  Level of consciousness: awake and sleepy but conscious  Pain score: 0  Pain management: adequate  Airway patency: patent  Anesthetic complications: No anesthetic complications  PONV Status: none  Cardiovascular status: acceptable and tachycardic  Respiratory status: acceptable, face mask, spontaneous ventilation and nonlabored ventilation  Hydration status: acceptable

## 2017-12-06 NOTE — DISCHARGE INSTRUCTIONS
Please follow all post op instructions and follow up appointment time from your physician's office included in your discharge packet.  .   No pushing, pulling, tugging,  heavy lifting, or strenuous activity.  No major decision making, driving, or drinking alcoholic beverages for 24 hours. ( due to the medications you have  received)  Always use good hand hygiene/washing techniques.  NO driving while taking pain medications.    To assist you in voiding:  Drink plenty of fluids  Listen to running water while attempting to void.    If you are unable to urinate and you have an uncomfortable urge to void or it has been   6 hours since you were discharged, return to the Emergency Room

## 2017-12-06 NOTE — INTERVAL H&P NOTE
H&P updated. The patient was examined and the following changes are noted:  Patient had recurrent cyst of left thyroid.  She is coming for a left thyroid lobectomy.  Risk of bleeding, infection and possible nerve injury discussed and patient agreeable

## 2017-12-08 ENCOUNTER — OFFICE VISIT (OUTPATIENT)
Dept: SURGERY | Facility: CLINIC | Age: 67
End: 2017-12-08

## 2017-12-08 ENCOUNTER — HOSPITAL ENCOUNTER (OUTPATIENT)
Dept: GENERAL RADIOLOGY | Facility: HOSPITAL | Age: 67
Discharge: HOME | End: 2017-12-08
Attending: SURGERY | Admitting: SURGERY

## 2017-12-08 VITALS
HEIGHT: 66 IN | HEART RATE: 75 BPM | OXYGEN SATURATION: 98 % | BODY MASS INDEX: 28.59 KG/M2 | SYSTOLIC BLOOD PRESSURE: 142 MMHG | TEMPERATURE: 97.9 F | DIASTOLIC BLOOD PRESSURE: 68 MMHG | WEIGHT: 177.91 LBS

## 2017-12-08 DIAGNOSIS — E89.0 S/P THYROIDECTOMY: ICD-10-CM

## 2017-12-08 DIAGNOSIS — R05.9 COUGH: Primary | ICD-10-CM

## 2017-12-08 DIAGNOSIS — Z48.89 POSTOPERATIVE VISIT: Primary | ICD-10-CM

## 2017-12-08 DIAGNOSIS — R50.9 FEVER, UNSPECIFIED FEVER CAUSE: ICD-10-CM

## 2017-12-08 DIAGNOSIS — R05.9 COUGH: ICD-10-CM

## 2017-12-08 DIAGNOSIS — J02.9 ACUTE PHARYNGITIS, UNSPECIFIED ETIOLOGY: ICD-10-CM

## 2017-12-08 PROCEDURE — 99212 OFFICE O/P EST SF 10 MIN: CPT | Performed by: SURGERY

## 2017-12-08 PROCEDURE — 71020 HC CHEST PA AND LATERAL: CPT

## 2017-12-08 RX ORDER — AZITHROMYCIN 250 MG/1
TABLET, FILM COATED ORAL
Qty: 6 TABLET | Refills: 0 | Status: SHIPPED | OUTPATIENT
Start: 2017-12-08 | End: 2017-12-13

## 2017-12-08 NOTE — PROGRESS NOTES
Patient: Latonya Mei    YOB: 1950    Date: 12/08/2017    Primary Care Provider: Avtar Calzada MD    Reason for Consultation: Post op thyroidectomy     Chief Complaint:   Chief Complaint   Patient presents with   • Post-op     post op thyroidectomy       History of present illness:  I saw the patient in the office today as a followup from their recent thyroidectomy. She states that she has had a cough and headache since her surgery. She complains of fever, chills and an odor coming from the excision site. She denies nausea and vomiting. Pathology report was benign for malignancy.  Patient appears to have upper respiratory infection with a productive cough.  Patient concerned about possibility of cough and also about thyroid incision.      Vital Signs   Temp:  [97.9 °F (36.6 °C)] 97.9 °F (36.6 °C)  Heart Rate:  [75] 75  BP: (142)/(68) 142/68    Physical Exam:   General Appearance:    Alert, cooperative, in no acute distress, wound clean dry without infection   Abdomen:     no masses, no organomegaly, soft non-tender, non-distended, no guarding, wounds are well healed   Chest:      Clear to ausculation, few rhonchi in both bases.       Results Review:   I reviewed the patient's new clinical results.    Assessment / Plan:    1. Postoperative visit    2. Acute pharyngitis, unspecified etiology        I did discuss the situation with the patient today in the office and they have done well from their recent lesion excision, I don't think that the patient needs any further intervention and I need to see them back only if they have further problems. Pathology report was reviewed with the patient in the office.We'll call in a Z-Aaron for upper respiratory infection.  Order chest x-ray to rule out lung involvement.    Electronically signed by Pao John MD  12/08/17    Scribed for Pao John MD by Anisa Soto. 12/8/2017  2:33 PM

## 2017-12-11 LAB
LAB AP CASE REPORT: NORMAL
Lab: NORMAL
PATH REPORT.FINAL DX SPEC: NORMAL

## 2017-12-13 ENCOUNTER — OFFICE VISIT (OUTPATIENT)
Dept: SURGERY | Facility: CLINIC | Age: 67
End: 2017-12-13

## 2017-12-13 VITALS
HEART RATE: 72 BPM | HEIGHT: 66 IN | TEMPERATURE: 97.4 F | SYSTOLIC BLOOD PRESSURE: 138 MMHG | DIASTOLIC BLOOD PRESSURE: 64 MMHG | OXYGEN SATURATION: 98 % | WEIGHT: 177.91 LBS | BODY MASS INDEX: 28.59 KG/M2

## 2017-12-13 DIAGNOSIS — Z48.89 POSTOPERATIVE VISIT: Primary | ICD-10-CM

## 2017-12-13 PROCEDURE — 99024 POSTOP FOLLOW-UP VISIT: CPT | Performed by: SURGERY

## 2017-12-13 NOTE — PROGRESS NOTES
Patient: Latonya Mei    YOB: 1950    Date: 12/13/2017    Primary Care Provider: Avtar Calzada MD    Reason for Consultation: Post op thyroidectomy    Chief Complaint:   Chief Complaint   Patient presents with   • Post-op     Post op thyroidectomy       History of present illness:  I saw the patient in the office today as a followup from their recent lesion excision, the pathology report did show chronic thyroiditis and epithelized cyst.  They state that they have done well and are having no problems.      Vital Signs   Temp:  [97.4 °F (36.3 °C)] 97.4 °F (36.3 °C)  Heart Rate:  [72] 72  BP: (138)/(64) 138/64    Physical Exam:   General Appearance:    Alert, cooperative, in no acute distress, wound clean dry without infection   Abdomen:     no masses, no organomegaly, soft non-tender, non-distended, no guarding, wounds are well healed   Chest:      Clear to ausculation     Results Review:   I reviewed the patient's new clinical results.    Assessment / Plan:    1. Postoperative visit        I did discuss the situation with the patient today in the office and they have done well from their recent lesion excision, I don't think that the patient needs any further intervention and I need to see them back only if they have further problems. Pathology report was reviewed with the patient in the office.Follow-up in one year for repeat ultrasound.    Electronically signed by Pao John MD  12/13/17      Scribed for Pao John MD by Michelle Milligan. 12/13/2017  11:21 AM

## 2018-01-25 ENCOUNTER — HOSPITAL ENCOUNTER (OUTPATIENT)
Dept: GENERAL RADIOLOGY | Facility: HOSPITAL | Age: 68
Discharge: HOME | End: 2018-01-25
Attending: INTERNAL MEDICINE | Admitting: INTERNAL MEDICINE

## 2018-01-25 ENCOUNTER — TRANSCRIBE ORDERS (OUTPATIENT)
Dept: ADMINISTRATIVE | Facility: HOSPITAL | Age: 68
End: 2018-01-25

## 2018-01-25 DIAGNOSIS — R05.9 COUGH: Primary | ICD-10-CM

## 2018-01-25 PROCEDURE — 71046 X-RAY EXAM CHEST 2 VIEWS: CPT

## 2018-02-02 ENCOUNTER — OFFICE VISIT (OUTPATIENT)
Dept: ENDOCRINOLOGY | Facility: CLINIC | Age: 68
End: 2018-02-02

## 2018-02-02 VITALS
WEIGHT: 176 LBS | BODY MASS INDEX: 29.32 KG/M2 | HEIGHT: 65 IN | OXYGEN SATURATION: 97 % | DIASTOLIC BLOOD PRESSURE: 74 MMHG | SYSTOLIC BLOOD PRESSURE: 130 MMHG | HEART RATE: 78 BPM

## 2018-02-02 DIAGNOSIS — E89.0 POSTOPERATIVE HYPOTHYROIDISM: ICD-10-CM

## 2018-02-02 DIAGNOSIS — L90.5 SCAR: ICD-10-CM

## 2018-02-02 DIAGNOSIS — E04.1 THYROID CYST: Primary | ICD-10-CM

## 2018-02-02 LAB
T4 FREE SERPL-MCNC: 0.91 NG/DL (ref 0.89–1.76)
TSH SERPL DL<=0.05 MIU/L-ACNC: 9.05 MIU/ML (ref 0.35–5.35)

## 2018-02-02 PROCEDURE — 84439 ASSAY OF FREE THYROXINE: CPT | Performed by: INTERNAL MEDICINE

## 2018-02-02 PROCEDURE — 99214 OFFICE O/P EST MOD 30 MIN: CPT | Performed by: INTERNAL MEDICINE

## 2018-02-02 PROCEDURE — 84443 ASSAY THYROID STIM HORMONE: CPT | Performed by: INTERNAL MEDICINE

## 2018-02-07 PROBLEM — E89.0 POSTOPERATIVE HYPOTHYROIDISM: Status: ACTIVE | Noted: 2018-02-07

## 2018-02-07 NOTE — PROGRESS NOTES
Thyroid Problem (F/u for thyroid nodule, c/o tenderness on right side of throat)    Subjective    Latonya Mei is a 67 y.o. female. she is being seen for follow-up  Patient presented for second opinion regarding thyroid surgery. Patient had fine-needle aspiration of the thyroid nodule in 9/11/2017, 10/04/2017 and 11/07/2017. The cytology showed hemorrhagic cystic fluid, which recollected several times.     Left thyroid lobectomy was recommended for the cystic nodule in the left thyroid measured 4 x 3 cm cytology from 09/11/2017 showed nondiagnostic specimen with only block present and no follicular cells.  I have recommended surgery and patient underwent hemithyroidectomy. POstop scar is in good condition. She reported small bump at one of the sides.       Thyroid Problem   Symptoms include fatigue. Patient reports no constipation, diaphoresis, diarrhea, palpitations or tremors.       Review of Systems  Review of Systems   Constitutional: Positive for fatigue and unexpected weight change (weight gain). Negative for activity change, appetite change, chills and diaphoresis.   HENT: Positive for sore throat (increased after 3d Cyst aspiration), trouble swallowing and voice change (Hoarseness). Negative for congestion, ear pain, facial swelling, hearing loss and postnasal drip.    Eyes: Negative.  Negative for redness, itching and visual disturbance.   Respiratory: Negative for cough and shortness of breath.    Cardiovascular: Negative for chest pain, palpitations and leg swelling.   Gastrointestinal: Negative for abdominal distention, abdominal pain, constipation, diarrhea, nausea and vomiting.   Endocrine:        As listed in HPI   Genitourinary: Negative.    Musculoskeletal: Positive for arthralgias. Negative for back pain, joint swelling, myalgias, neck pain and neck stiffness.   Skin: Negative.    Allergic/Immunologic: Negative.    Neurological: Positive for numbness. Negative for dizziness, tremors, syncope,  weakness, light-headedness and headaches.   Hematological: Positive for adenopathy.   Psychiatric/Behavioral: Negative.    All other systems reviewed and are negative.    Current medications:  Current Outpatient Prescriptions   Medication Sig Dispense Refill   • aspirin 81 MG chewable tablet Chew 81 mg Daily.     • citalopram (CeleXA) 20 MG tablet Take 20 mg by mouth Daily.     • Omega-3 Fatty Acids (FISH OIL) 1000 MG capsule capsule Take  by mouth Daily With Breakfast.     • omeprazole (priLOSEC) 20 MG capsule Take 20 mg by mouth Daily.     • rosuvastatin (CRESTOR) 40 MG tablet Take 40 mg by mouth Daily.     • VITAMIN D, CHOLECALCIFEROL, PO Take  by mouth Daily.       No current facility-administered medications for this visit.        The following portions of the patient's history were reviewed and updated as appropriate: She  has a past medical history of Arthritis; Arthritis; Body piercing; Cataracts, bilateral; Depression; Difficulty swallowing; Diverticulitis; Female cystocele; Fibromyalgia; Full dentures; GERD (gastroesophageal reflux disease); History of prolapse of bladder; History of stomach ulcers; Hyperlipidemia; Scoliosis; Wears contact lenses; and Wears glasses.  She  has a past surgical history that includes Bladder suspension; Tubal ligation; Carpal tunnel release (Right); Hysterectomy; Cystocele repair; Appendectomy; Colonoscopy; Esophagoscopy / EGD; and Thyroidectomy (Left, 12/6/2017).  Her family history includes Arthritis in her father, mother, and sister; Brain cancer in her brother; Breast cancer in her other and sister; Cancer in her sister; Diabetes in her brother and sister; Heart attack in her mother; Lung cancer in her father; Osteoporosis in her mother; Prostate cancer in her father.  She  reports that she has never smoked. She has never used smokeless tobacco. She reports that she does not drink alcohol or use illicit drugs.  She is allergic to latex and tuberculin  "tests..        Objective      Vitals:    02/02/18 1334   BP: 130/74   Pulse: 78   SpO2: 97%   Weight: 79.8 kg (176 lb)   Height: 165.1 cm (65\")   Body mass index is 29.29 kg/(m^2).  Physical Exam   Constitutional: She is oriented to person, place, and time. She appears well-developed and well-nourished.   HENT:   Head: Normocephalic and atraumatic.   Mouth/Throat: Oropharynx is clear and moist.   Eyes: Conjunctivae are normal.   Neck: Normal range of motion. No muscular tenderness present. Carotid bruit is not present. Thyromegaly (left 3-4 cm soft thyroid nodule palpated ) present. No thyroid mass present.   The neck is supple and no assimetry visualized   Cardiovascular: Normal rate, regular rhythm and normal heart sounds.    Pulmonary/Chest: Effort normal and breath sounds normal.   Musculoskeletal: She exhibits no edema.   Lymphadenopathy:     She has no cervical adenopathy.   Neurological: She is alert and oriented to person, place, and time.   Skin: Skin is warm. No rash noted.   Psychiatric: She has a normal mood and affect. Thought content normal.   Vitals reviewed.      LABS AND IMAGING  Results for orders placed or performed in visit on 02/02/18   TSH   Result Value Ref Range    TSH 9.046 (H) 0.350 - 5.350 mIU/mL   T4, Free   Result Value Ref Range    Free T4 0.91 0.89 - 1.76 ng/dL       Assessment/Plan       Problem List Items Addressed This Visit        Endocrine    Thyroid cyst - Primary    Relevant Orders    TSH (Completed)    T4, Free (Completed)    TSH    T4, Free    Postoperative hypothyroidism      Other Visit Diagnoses     Scar                 PLAN  1. Scar management reviewed with the patient and I have given her recommendation on using mederma, Vit E or other anti-scar treatment. No signs of infection or wound nonunion seen.     2.Repeat thyroid tests and decide regarding the need for thyroid med.      THyroid function is low - start low dose thyroid medication. Levothyroxine 25 mcg daily. " Repeat labs in 3-4 months.

## 2018-02-08 ENCOUNTER — TELEPHONE (OUTPATIENT)
Dept: ENDOCRINOLOGY | Facility: CLINIC | Age: 68
End: 2018-02-08

## 2018-02-08 DIAGNOSIS — E04.1 THYROID CYST: Primary | ICD-10-CM

## 2018-02-08 RX ORDER — LEVOTHYROXINE SODIUM 0.03 MG/1
25 TABLET ORAL DAILY
Qty: 30 TABLET | Refills: 5 | Status: SHIPPED | OUTPATIENT
Start: 2018-02-08 | End: 2018-08-05 | Stop reason: SDUPTHER

## 2018-02-08 NOTE — TELEPHONE ENCOUNTER
Pt informed of results and verbalized understanding. Rx has been sent to [facility] and labs have been entered into pt's chart.

## 2018-02-08 NOTE — TELEPHONE ENCOUNTER
----- Message from Paris VERNON MD sent at 2/7/2018  1:34 AM EST -----  Please call the patient regarding her lab results. tHyroid function is low - start low dose thyroid supplement - levothyroxine 25 mcg daily. Please send pt rx. Repeat labs in 3 [facility] (please place order for 3 months TSh and free T4

## 2018-04-02 ENCOUNTER — OFFICE VISIT (OUTPATIENT)
Dept: SURGERY | Facility: CLINIC | Age: 68
End: 2018-04-02

## 2018-04-02 VITALS
HEIGHT: 65 IN | TEMPERATURE: 97.4 F | WEIGHT: 178.8 LBS | DIASTOLIC BLOOD PRESSURE: 70 MMHG | BODY MASS INDEX: 29.79 KG/M2 | OXYGEN SATURATION: 96 % | SYSTOLIC BLOOD PRESSURE: 142 MMHG | HEART RATE: 79 BPM

## 2018-04-02 DIAGNOSIS — E04.1 THYROID NODULE: Primary | ICD-10-CM

## 2018-04-02 PROCEDURE — 99213 OFFICE O/P EST LOW 20 MIN: CPT | Performed by: SURGERY

## 2018-04-02 NOTE — PROGRESS NOTES
"Patient: Latonya Mei    YOB: 1950    Date: 04/02/2018    Primary Care Provider: Avtar Calzada MD    Chief Complaint   Patient presents with   • Follow-up     patient is here for a three month follow up on thyroid       History: Patient is here for a three month follow up on thyroid.  Patient had a left thyroid lobectomy done 12/26/17.  Patient states that she has some soreness on the left side.  Patient states that she has a numbness on the left side as well.  Patient denies any trouble swallowing.  Patient denies any excessive fatigue.Patient is on Synthroid daily.  Ultrasound today indicates no residual left thyroid tissue.  A small cyst on the right lobe.  The following portions of the patient's history were reviewed and updated as appropriate: allergies, current medications, past family history, past medical history, past social history, past surgical history and problem list.      Review of Systems   Constitutional: Negative for chills, fatigue and fever.   HENT: Negative for trouble swallowing.    Respiratory: Positive for shortness of breath. Negative for cough.    Cardiovascular: Negative for chest pain.   Gastrointestinal: Negative for abdominal pain, diarrhea, nausea and vomiting.       Vital Signs  /70   Pulse 79   Temp 97.4 °F (36.3 °C)   Ht 165.1 cm (65\")   Wt 81.1 kg (178 lb 12.8 oz)   SpO2 96%   BMI 29.75 kg/m²     Allergies:  Allergies   Allergen Reactions   • Latex Hives     AND ITCHING   • Tuberculin Tests Itching       Medications:    Current Outpatient Prescriptions:   •  aspirin 81 MG chewable tablet, Chew 81 mg Daily., Disp: , Rfl:   •  citalopram (CeleXA) 20 MG tablet, Take 20 mg by mouth Daily., Disp: , Rfl:   •  levothyroxine (SYNTHROID, LEVOTHROID) 25 MCG tablet, Take 1 tablet by mouth Daily., Disp: 30 tablet, Rfl: 5  •  Omega-3 Fatty Acids (FISH OIL) 1000 MG capsule capsule, Take  by mouth Daily With Breakfast., Disp: , Rfl:   •  omeprazole (priLOSEC) " 20 MG capsule, Take 20 mg by mouth Daily., Disp: , Rfl:   •  rosuvastatin (CRESTOR) 40 MG tablet, Take 40 mg by mouth Daily., Disp: , Rfl:   •  VITAMIN D, CHOLECALCIFEROL, PO, Take  by mouth Daily., Disp: , Rfl:     Physical Exam:   General Appearance:    Alert, cooperative, in no acute distress   Head:    Normocephalic, without obvious abnormality, atraumatic   Lungs:     Clear to auscultation,respirations regular, even and                  unlabored    Heart:    Regular rhythm and normal rate, normal S1 and S2, no            murmur, no gallop, no rub, no click   Abdomen:     Normal bowel sounds, no masses, no organomegaly, soft        non-tender, non-distended, no guarding, no rebound                tenderness   Extremities:   Moves all extremities well, no edema, no cyanosis, no             redness   Pulses:   Pulses palpable and equal bilaterally   Skin:   No bleeding, bruising or rash       Results Review:   I reviewed the patient's new clinical results.     Assessment/Plan     1. Thyroid nodule      Patient doing well, continue Synthroid daily.  Follow-up in one year for ultrasound follow-up    Electronically signed by Pao John MD  04/02/18   Scribed for Pao John MD by Carmen Cadena. 4/2/2018  2:00 PM

## 2018-05-03 ENCOUNTER — TELEPHONE (OUTPATIENT)
Dept: ENDOCRINOLOGY | Facility: CLINIC | Age: 68
End: 2018-05-03

## 2018-05-03 NOTE — TELEPHONE ENCOUNTER
Pt was informed of results and verbalized understanding. Pt said that she is feeling much much better.

## 2018-05-03 NOTE — TELEPHONE ENCOUNTER
----- Message from Paris VERNON MD sent at 5/2/2018  5:58 PM EDT -----  Please call the patient regarding her lab results. Thyroid levels improved an function is normal. Continue low dose levothyroxine. Please ask if she is feeling better on the meds,

## 2018-07-05 ENCOUNTER — TRANSCRIBE ORDERS (OUTPATIENT)
Dept: MAMMOGRAPHY | Facility: HOSPITAL | Age: 68
End: 2018-07-05

## 2018-07-05 DIAGNOSIS — Z12.39 BREAST CANCER SCREENING: Primary | ICD-10-CM

## 2018-08-06 RX ORDER — LEVOTHYROXINE SODIUM 0.03 MG/1
TABLET ORAL
Qty: 30 TABLET | Refills: 0 | Status: SHIPPED | OUTPATIENT
Start: 2018-08-06 | End: 2018-08-17 | Stop reason: SDUPTHER

## 2018-08-10 ENCOUNTER — HOSPITAL ENCOUNTER (OUTPATIENT)
Dept: MAMMOGRAPHY | Facility: HOSPITAL | Age: 68
Discharge: HOME | End: 2018-08-10
Attending: INTERNAL MEDICINE | Admitting: INTERNAL MEDICINE

## 2018-08-10 DIAGNOSIS — Z12.39 BREAST CANCER SCREENING: ICD-10-CM

## 2018-08-10 PROCEDURE — 77063 BREAST TOMOSYNTHESIS BI: CPT

## 2018-08-10 PROCEDURE — 77067 SCR MAMMO BI INCL CAD: CPT

## 2018-08-17 ENCOUNTER — OFFICE VISIT (OUTPATIENT)
Dept: ENDOCRINOLOGY | Facility: CLINIC | Age: 68
End: 2018-08-17

## 2018-08-17 VITALS
HEIGHT: 65 IN | BODY MASS INDEX: 29.46 KG/M2 | HEART RATE: 69 BPM | SYSTOLIC BLOOD PRESSURE: 116 MMHG | OXYGEN SATURATION: 98 % | DIASTOLIC BLOOD PRESSURE: 66 MMHG | WEIGHT: 176.8 LBS

## 2018-08-17 DIAGNOSIS — E89.0 POSTOPERATIVE HYPOTHYROIDISM: ICD-10-CM

## 2018-08-17 DIAGNOSIS — E11.9 TYPE 2 DIABETES MELLITUS WITHOUT COMPLICATION, WITHOUT LONG-TERM CURRENT USE OF INSULIN (HCC): Primary | ICD-10-CM

## 2018-08-17 LAB
GLUCOSE BLDC GLUCOMTR-MCNC: 129 MG/DL (ref 70–130)
HBA1C MFR BLD: 7.1 %

## 2018-08-17 PROCEDURE — 99214 OFFICE O/P EST MOD 30 MIN: CPT | Performed by: INTERNAL MEDICINE

## 2018-08-17 PROCEDURE — 83036 HEMOGLOBIN GLYCOSYLATED A1C: CPT | Performed by: INTERNAL MEDICINE

## 2018-08-17 PROCEDURE — 82947 ASSAY GLUCOSE BLOOD QUANT: CPT | Performed by: INTERNAL MEDICINE

## 2018-08-17 RX ORDER — VENLAFAXINE HYDROCHLORIDE 75 MG/1
CAPSULE, EXTENDED RELEASE ORAL
COMMUNITY

## 2018-08-17 RX ORDER — CHLORAL HYDRATE 500 MG
CAPSULE ORAL
COMMUNITY
End: 2019-05-14

## 2018-08-17 RX ORDER — LANCETS
EACH MISCELLANEOUS
Qty: 50 EACH | Refills: 12 | Status: SHIPPED | OUTPATIENT
Start: 2018-08-17

## 2018-08-17 RX ORDER — METFORMIN HYDROCHLORIDE 500 MG/1
500 TABLET, EXTENDED RELEASE ORAL
Qty: 30 TABLET | Refills: 11 | Status: SHIPPED | OUTPATIENT
Start: 2018-08-17 | End: 2018-09-18 | Stop reason: SDUPTHER

## 2018-08-17 RX ORDER — LEVOTHYROXINE SODIUM 0.03 MG/1
25 TABLET ORAL DAILY
Qty: 30 TABLET | Refills: 11 | Status: SHIPPED | OUTPATIENT
Start: 2018-08-17 | End: 2018-09-18 | Stop reason: SDUPTHER

## 2018-08-17 NOTE — PROGRESS NOTES
Hypothyroidism (Follow UP )    Subjective    Latonya Mei is a 67 y.o. female. she is being seen for follow-up  Patient presented for second opinion regarding thyroid surgery. Patient had fine-needle aspiration of the thyroid nodule in 9/11/2017, 10/04/2017 and 11/07/2017. The cytology showed hemorrhagic cystic fluid, which recollected several times.     Left thyroid lobectomy was recommended for the cystic nodule in the left thyroid measured 4 x 3 cm cytology from 09/11/2017 showed nondiagnostic specimen.  I have recommended surgery and patient underwent hemithyroidectomy.   She started low dose levothyroxine 25  mcg after the surgery in 2/2018 . Her levels were borderline.     We have tested A1C and her levels were 7.1. She reported having borderline levels at some point. She doesn't follow a diet and doesn't exercise regularly    Thyroid Problem   Symptoms include fatigue. Patient reports no constipation, diaphoresis, diarrhea, palpitations or tremors.   Hypothyroidism   Associated symptoms include arthralgias, fatigue and numbness. Pertinent negatives include no abdominal pain, chest pain, chills, congestion, coughing, diaphoresis, headaches, joint swelling, myalgias, nausea, neck pain, vomiting or weakness.       Review of Systems  Review of Systems   Constitutional: Positive for fatigue and unexpected weight change (weight gain). Negative for activity change, appetite change, chills and diaphoresis.   HENT: Negative for congestion, ear pain, facial swelling, hearing loss and postnasal drip. Voice change: Hoarseness.    Eyes: Negative.  Negative for redness, itching and visual disturbance.   Respiratory: Negative for cough and shortness of breath.    Cardiovascular: Negative for chest pain, palpitations and leg swelling.   Gastrointestinal: Negative for abdominal distention, abdominal pain, constipation, diarrhea, nausea and vomiting.   Endocrine:        As listed in HPI   Genitourinary: Negative.     Musculoskeletal: Positive for arthralgias. Negative for back pain, joint swelling, myalgias, neck pain and neck stiffness.   Skin: Negative.    Allergic/Immunologic: Negative.    Neurological: Positive for numbness. Negative for dizziness, tremors, syncope, weakness, light-headedness and headaches.   Hematological: Positive for adenopathy.   Psychiatric/Behavioral: Negative.    All other systems reviewed and are negative.    Current medications:  Current Outpatient Prescriptions   Medication Sig Dispense Refill   • aspirin 81 MG chewable tablet Chew 81 mg Daily.     • levothyroxine (SYNTHROID, LEVOTHROID) 25 MCG tablet TAKE ONE TABLET BY MOUTH DAILY 30 tablet 0   • Omega-3 Fatty Acids (FISH OIL) 1000 MG capsule capsule Take  by mouth Daily With Breakfast.     • Omega-3 Fatty Acids (FISH OIL) 1000 MG capsule capsule Fish Oil   1 qd     • omeprazole (priLOSEC) 20 MG capsule Take 20 mg by mouth Daily.     • rosuvastatin (CRESTOR) 40 MG tablet Take 40 mg by mouth Daily.     • venlafaxine XR (EFFEXOR-XR) 75 MG 24 hr capsule venlafaxine ER 75 mg capsule,extended release 24 hr   Take 1 capsule every day by oral route.     • VITAMIN D, CHOLECALCIFEROL, PO Take  by mouth Daily.       No current facility-administered medications for this visit.        The following portions of the patient's history were reviewed and updated as appropriate: She  has a past medical history of Arthritis; Arthritis; Body piercing; Cataracts, bilateral; Depression; Difficulty swallowing; Diverticulitis; Female cystocele; Fibromyalgia; Full dentures; GERD (gastroesophageal reflux disease); History of prolapse of bladder; History of stomach ulcers; Hyperlipidemia; Scoliosis; Wears contact lenses; and Wears glasses.  She  has a past surgical history that includes Bladder suspension; Tubal ligation; Carpal tunnel release (Right); Hysterectomy; Cystocele repair; Appendectomy; Colonoscopy; Esophagoscopy / EGD; and Thyroidectomy (Left, 12/6/2017).  Her  "family history includes Arthritis in her father, mother, and sister; Brain cancer in her brother; Breast cancer in her other and sister; Cancer in her sister; Diabetes in her brother and sister; Heart attack in her mother; Lung cancer in her father; Osteoporosis in her mother; Prostate cancer in her father.  She  reports that she has never smoked. She has never used smokeless tobacco. She reports that she does not drink alcohol or use drugs.  She is allergic to latex and tuberculin tests..        Objective      Vitals:    08/17/18 1401   BP: 116/66   Pulse: 69   SpO2: 98%   Weight: 80.2 kg (176 lb 12.8 oz)   Height: 165.1 cm (65\")   Body mass index is 29.42 kg/m².  Physical Exam   Constitutional: She is oriented to person, place, and time. She appears well-developed and well-nourished.   HENT:   Head: Normocephalic and atraumatic.   Mouth/Throat: Oropharynx is clear and moist.   Eyes: Conjunctivae are normal.   Neck: Normal range of motion. No muscular tenderness present. Carotid bruit is not present. No thyroid mass and no thyromegaly ( postop scar is in good condition) present.   The neck is supple and no assimetry visualized   Cardiovascular: Normal rate, regular rhythm and normal heart sounds.    Pulmonary/Chest: Effort normal and breath sounds normal.   Musculoskeletal: She exhibits no edema.   Lymphadenopathy:     She has no cervical adenopathy.   Neurological: She is alert and oriented to person, place, and time.   Skin: Skin is warm. No rash noted.   Psychiatric: She has a normal mood and affect. Thought content normal.   Vitals reviewed.      LABS AND IMAGING  Results for orders placed or performed in visit on 02/02/18   TSH   Result Value Ref Range    TSH 9.046 (H) 0.350 - 5.350 mIU/mL   T4, Free   Result Value Ref Range    Free T4 0.91 0.89 - 1.76 ng/dL       Assessment/Plan       Problem List Items Addressed This Visit        Endocrine    Postoperative hypothyroidism - Primary             PLAN  1. " Continue levothyroxine 25 mcg daily.     2.Repeat thyroid tests     3. New dx of diabetes mellitus. I have reviewed diabetes in detail with the patient today. All questions have been answered to her satisfaction. We have discussed the goals of HgbA1C, glucose testing frequency, consistent carbohydrate diet  and diabetes complication prevention.   Patient was recommended to increase exercise activity and follow ADA recommended diet. The literature with the examples of meal plan was provided to patient and healthy eating instructions reviewed.  We have discussed the following concepts:  glucose meter dispensed to patient, home glucose monitoring emphasized, patient was recommended to increase exercise activity to at least 30 minutes 5 days per week., all medications, side effects and compliance discussed carefully and Dietary recommendations explained and examples of healthy meals provided.    Start metformin 500 mg ER daily.        Repeat labs in 3-4 months.

## 2018-09-18 RX ORDER — LEVOTHYROXINE SODIUM 0.03 MG/1
25 TABLET ORAL DAILY
Qty: 90 TABLET | Refills: 2 | Status: SHIPPED | OUTPATIENT
Start: 2018-09-18

## 2018-09-18 RX ORDER — METFORMIN HYDROCHLORIDE 500 MG/1
500 TABLET, EXTENDED RELEASE ORAL
Qty: 90 TABLET | Refills: 2 | Status: SHIPPED | OUTPATIENT
Start: 2018-09-18

## 2019-03-19 ENCOUNTER — OFFICE VISIT (OUTPATIENT)
Dept: GASTROENTEROLOGY | Facility: CLINIC | Age: 69
End: 2019-03-19

## 2019-03-19 VITALS
TEMPERATURE: 97.7 F | WEIGHT: 178 LBS | RESPIRATION RATE: 16 BRPM | DIASTOLIC BLOOD PRESSURE: 76 MMHG | BODY MASS INDEX: 29.66 KG/M2 | HEIGHT: 65 IN | SYSTOLIC BLOOD PRESSURE: 149 MMHG | HEART RATE: 85 BPM

## 2019-03-19 DIAGNOSIS — Z12.11 COLON CANCER SCREENING: Primary | ICD-10-CM

## 2019-03-19 DIAGNOSIS — R12 HEARTBURN: ICD-10-CM

## 2019-03-19 DIAGNOSIS — R10.814 LEFT LOWER QUADRANT ABDOMINAL TENDERNESS WITHOUT REBOUND TENDERNESS: ICD-10-CM

## 2019-03-19 DIAGNOSIS — R10.33 PERIUMBILICAL ABDOMINAL PAIN: ICD-10-CM

## 2019-03-19 PROCEDURE — 99204 OFFICE O/P NEW MOD 45 MIN: CPT | Performed by: INTERNAL MEDICINE

## 2019-03-19 RX ORDER — METRONIDAZOLE 250 MG/1
TABLET ORAL
Qty: 28 TABLET | Refills: 0 | Status: SHIPPED | OUTPATIENT
Start: 2019-03-19 | End: 2019-04-30

## 2019-03-19 RX ORDER — CIPROFLOXACIN 500 MG/1
TABLET, FILM COATED ORAL
Qty: 14 TABLET | Refills: 0 | Status: SHIPPED | OUTPATIENT
Start: 2019-03-19 | End: 2019-04-30

## 2019-03-19 NOTE — PATIENT INSTRUCTIONS
"1. Low fiber diet (avoid fruits, vegetables, cereals, fiber supplements, nuts and seeds) for 4 days thereafter low-fat high-fiber diet.  2. Cipro (ciprofloxacin) tablets 500 mg. Take 1 tablet by mouth twice a day for 7 days. Side effects were discussed.  3. Flagyl (metronidazole) tablets 250 mg. Take 1 tablet one by mouth 4 times a day for 7 days.  Side effects were discussed.  4. Avoid laxatives, enemas for next 5 days.  However, for constipation the patient may use \"stool softeners\" 1-3 per day.  5. May take probiotics such as Align.  Take one orally daily while taking antibiotics and 1-2 weeks thereafter.  6. Colonoscopy: Description of the procedure, risks benefits alternatives and options including non-operative options were discussed with the patient in detail.  The patient understands and wishes to proceed.  This may however be postponed for about 3-4 weeks.  7. Prilosec(Omeprazole) DR capsule 40 mg. Take one capsule in the morning half an hour before eating daily.  8. The patient has been advised to call back in 1 week regarding progress.  "

## 2019-03-19 NOTE — PROGRESS NOTES
Chief Complaint   Patient presents with   • Colon Cancer Screening     History of Present Illness     For colon cancer screening.  The patient denies recent change in bowel habits.  There is no diarrhea or constipation.   There is no history of overt GI bleed (hematemesis melena or hematochezia).  The patient denies nausea or vomiting.    There is history of periumbilical abdominal pain off and on for the last 2 years.  The pain is sudden in onset, mild-moderate in severity and cramping-aching in character.  Frequency being once in 1-2 weeks.  The pain may last for several minutes.  There is no radiation of abdominal pain.  The patient feels abdominal pain just before the bowel movement.  After the bowel movement the patient feels better.      The patient has a history of reflux off and on for the last several years.  The reflux is moderately severe.  Symptoms are described as retrosternal burning sensation, and indigestion.  There is history of occasional regurgitative symptoms.  Frequency being several times per week.  The symptoms are worse at night.  The patient takes acid suppressive therapy with reasonable control of his symptoms.  The patient denies dysphagia or odynophagia.      There is no history of recent significant weight loss.  There is no history of liver or pancreatic disease.  There is no family history of colon cancer.  The patient had a colonoscopy in 2014.  The patient was noted to have significant tortuosity of the colon.  The patient has history of vertigo.    The patient claims that she has been having some fluctuations of her pulse.  The patient has palpitations.  She has counted her pulse on couple of occasions.  Once she noted the pulse to be around 130 and another time around 180.  Palpitations are not associated with dizziness or lightheadedness.  There is no history of syncopal episode.  The patient feels jittery at that time.  Previously she had undergone an EKG as well as cardiac  monitor.  Unfortunately the details are not known.  She has an appointment with cardiology service in April 2019.  The patient has history of hypothyroidism.  Currently she is on the replacement therapy.     Review of Systems   Constitutional: Positive for fatigue. Negative for appetite change, chills, fever and unexpected weight change.   HENT: Negative for mouth sores, nosebleeds and trouble swallowing.    Eyes: Negative for discharge and redness.   Respiratory: Negative for apnea, cough and shortness of breath.    Cardiovascular: Negative for chest pain, palpitations and leg swelling.   Gastrointestinal: Positive for abdominal pain. Negative for abdominal distention, anal bleeding, blood in stool, constipation, diarrhea, nausea and vomiting.   Endocrine: Negative for cold intolerance, heat intolerance and polydipsia.   Genitourinary: Negative for dysuria, hematuria and urgency.   Musculoskeletal: Positive for arthralgias, back pain and myalgias. Negative for joint swelling.   Skin: Negative for rash.   Allergic/Immunologic: Negative for food allergies and immunocompromised state.   Neurological: Positive for dizziness and headaches. Negative for seizures and syncope.   Hematological: Negative for adenopathy. Does not bruise/bleed easily.   Psychiatric/Behavioral: Negative for dysphoric mood. The patient is not nervous/anxious and is not hyperactive.         Depression.     Patient Active Problem List   Diagnosis   • Thyroid cyst   • Postoperative hypothyroidism   • Type 2 diabetes mellitus without complication (CMS/[facility])     Past Medical History:   Diagnosis Date   • Arrhythmia 2019   • Arthritis    • Arthritis    • Body piercing     EARS   • Cataracts, bilateral     SMALL   • Depression    • Difficulty swallowing    • Diverticulitis    • Female cystocele    • Fibromyalgia    • Fibromyalgia 2007   • Full dentures    • GERD (gastroesophageal reflux disease)    • History of prolapse of bladder     REPAIRED WITH  SURGERY   • History of stomach ulcers    • Hyperlipidemia    • Palpitations 2019   • Scoliosis    • Snores    • Type 2 diabetes mellitus without complication (CMS/HCC) 1/16/2018   • Vertigo 2007   • Wears contact lenses    • Wears glasses      Past Surgical History:   Procedure Laterality Date   • APPENDECTOMY      WITH TUBAL   • BLADDER SUSPENSION     • CARPAL TUNNEL RELEASE Right    • COLONOSCOPY     • CYSTOCELE REPAIR      STAGE 3   • ESOPHAGOSCOPY / EGD     • HYSTERECTOMY      VAGINAL/OVARIES LEFT INSIDE   • THYROIDECTOMY Left 12/6/2017    Procedure: THYROIDECTOMY LEFT;  Surgeon: Pao John MD;  Location: [facility];  Service:    • TUBAL ABDOMINAL LIGATION      1980   • WISDOM TOOTH EXTRACTION       Family History   Problem Relation Age of Onset   • Breast cancer Sister    • Arthritis Sister    • Cancer Sister    • Diabetes Sister    • Breast cancer Other    • Arthritis Mother    • Heart attack Mother    • Osteoporosis Mother    • Arthritis Father    • Lung cancer Father    • Prostate cancer Father    • Diabetes Brother    • Brain cancer Brother      Social History     Tobacco Use   • Smoking status: Never Smoker   • Smokeless tobacco: Never Used   Substance Use Topics   • Alcohol use: No       Current Outpatient Medications:   •  ACCU-CHEK FASTCLIX LANCETS misc, TEST 1-2 TIMES DAILY, Disp: 50 each, Rfl: 12  •  aspirin 81 MG chewable tablet, Chew 81 mg Daily., Disp: , Rfl:   •  glucose blood (ACCU-CHEK GUIDE) test strip, Test 1-2 Times daily, Disp: 50 each, Rfl: 12  •  levothyroxine (SYNTHROID, LEVOTHROID) 25 MCG tablet, Take 1 tablet by mouth Daily., Disp: 90 tablet, Rfl: 2  •  metFORMIN ER (GLUCOPHAGE-XR) 500 MG 24 hr tablet, Take 1 tablet by mouth Daily With Breakfast., Disp: 90 tablet, Rfl: 2  •  Omega-3 Fatty Acids (FISH OIL) 1000 MG capsule capsule, Take  by mouth Daily With Breakfast., Disp: , Rfl:   •  Omega-3 Fatty Acids (FISH OIL) 1000 MG capsule capsule, Fish Oil  1 qd, Disp: , Rfl:   •   "omeprazole (priLOSEC) 20 MG capsule, Take 20 mg by mouth Daily., Disp: , Rfl:   •  rosuvastatin (CRESTOR) 40 MG tablet, Take 40 mg by mouth Daily., Disp: , Rfl:   •  venlafaxine XR (EFFEXOR-XR) 75 MG 24 hr capsule, venlafaxine ER 75 mg capsule,extended release 24 hr  Take 1 capsule every day by oral route., Disp: , Rfl:   •  VITAMIN D, CHOLECALCIFEROL, PO, Take  by mouth Daily., Disp: , Rfl:   •  ciprofloxacin (CIPRO) 500 MG tablet, Take 1 tablet by mouth twice a day x 7 days, Disp: 14 tablet, Rfl: 0  •  metroNIDAZOLE (FLAGYL) 250 MG tablet, Take 1 tablet by mouth four times a day x 7 days, Disp: 28 tablet, Rfl: 0    Allergies   Allergen Reactions   • Latex Hives     AND ITCHING   • Tuberculin Tests Itching       Blood pressure 149/76, pulse 85, temperature 97.7 °F (36.5 °C), resp. rate 16, height 165.1 cm (65\"), weight 80.7 kg (178 lb), not currently breastfeeding.   Currently, her pulse is regular.    Physical Exam   Constitutional: She is oriented to person, place, and time. She appears well-developed and well-nourished. No distress.   HENT:   Head: Normocephalic and atraumatic.   Right Ear: Hearing and external ear normal.   Left Ear: Hearing and external ear normal.   Nose: Nose normal.   Mouth/Throat: Oropharynx is clear and moist and mucous membranes are normal. Mucous membranes are not pale, not dry and not cyanotic. No oral lesions. No oropharyngeal exudate.   Eyes: Conjunctivae and EOM are normal. Right eye exhibits no discharge. Left eye exhibits no discharge. No scleral icterus.   Neck: Trachea normal. Neck supple. No JVD present. No edema present. No thyroid mass and no thyromegaly present.   Cardiovascular: Normal rate, regular rhythm, S2 normal and normal heart sounds. Exam reveals no gallop, no S3 and no friction rub.   No murmur heard.  Pulmonary/Chest: Effort normal and breath sounds normal. No respiratory distress. She has no wheezes. She has no rales. She exhibits no tenderness.   Abdominal: " Soft. Normal appearance and bowel sounds are normal. She exhibits no distension, no ascites and no mass. There is no splenomegaly or hepatomegaly. There is tenderness in the left lower quadrant. There is no rigidity, no rebound and no guarding. No hernia.   Musculoskeletal: She exhibits no tenderness or deformity.     Vascular Status -  Her right foot exhibits no edema. Her left foot exhibits no edema.  Lymphadenopathy:     She has no cervical adenopathy.        Left: No supraclavicular adenopathy present.   Neurological: She is alert and oriented to person, place, and time. She has normal strength. No cranial nerve deficit or sensory deficit. She exhibits normal muscle tone. Coordination normal.   Skin: No rash noted. She is not diaphoretic. No cyanosis. No pallor. Nails show no clubbing.   Psychiatric: She has a normal mood and affect. Her behavior is normal. Judgment and thought content normal.   Nursing note and vitals reviewed.  Stigmata of chronic liver disease:  None.  Asterixis:  None.    Laboratory Testing:  Upon review of medical records:    Dated January 16, 2018 hepatitis C antibody nonreactive.    Dated April 17, 2018 TSH 3.19.  Hemoglobin A1c 7.0%.    Procedures:  Upon review of medical records:    Dated February 11, 2014 the patient underwent a colonoscopy to the terminal ileum which revealed: Left-sided diverticulosis and internal hemorrhoids.  No biopsy.      Assessment:      ICD-10-CM ICD-9-CM   1. Colon cancer screening Z12.11 V76.51   2. Periumbilical abdominal pain R10.33 789.05   3. Left lower quadrant abdominal tenderness without rebound tenderness R10.814 789.64   4. Heartburn R12 787.1         Discussion:  1.     Plan/  Patient Instructions   1. Low fiber diet (avoid fruits, vegetables, cereals, fiber supplements, nuts and seeds) for 4 days thereafter low-fat high-fiber diet.  2. Cipro (ciprofloxacin) tablets 500 mg. Take 1 tablet by mouth twice a day for 7 days. Side effects were  "discussed.  3. Flagyl (metronidazole) tablets 250 mg. Take 1 tablet one by mouth 4 times a day for 7 days.  Side effects were discussed.  4. Avoid laxatives, enemas for next 5 days.  However, for constipation the patient may use \"stool softeners\" 1-3 per day.  5. May take probiotics such as Align.  Take one orally daily while taking antibiotics and 1-2 weeks thereafter.  6. Colonoscopy: Description of the procedure, risks benefits alternatives and options including non-operative options were discussed with the patient in detail.  The patient understands and wishes to proceed.  This may however be postponed for about 3-4 weeks.  7. Prilosec(Omeprazole) DR capsule 40 mg. Take one capsule in the morning half an hour before eating daily.  8. The patient has been advised to call back in 1 week regarding progress.       Chevy Bee MD    "

## 2019-04-15 ENCOUNTER — OFFICE VISIT (OUTPATIENT)
Dept: SURGERY | Facility: CLINIC | Age: 69
End: 2019-04-15

## 2019-04-15 VITALS
OXYGEN SATURATION: 98 % | BODY MASS INDEX: 29.82 KG/M2 | WEIGHT: 179 LBS | HEIGHT: 65 IN | DIASTOLIC BLOOD PRESSURE: 84 MMHG | HEART RATE: 78 BPM | TEMPERATURE: 98.1 F | SYSTOLIC BLOOD PRESSURE: 142 MMHG

## 2019-04-15 DIAGNOSIS — E04.1 THYROID NODULE: Primary | ICD-10-CM

## 2019-04-15 PROCEDURE — 99212 OFFICE O/P EST SF 10 MIN: CPT | Performed by: SURGERY

## 2019-04-15 NOTE — PROGRESS NOTES
Patient: Latonya Mei    YOB: 1950    Date: 04/15/2019    Primary Care Provider: Avtar Calzada MD    Chief Complaint   Patient presents with   • Follow-up     thyroid       History: Patient is in the office today for 1 year follow up on left thyroid lobectomy. Patient states she is doing well at this time but does have some issues with heart palpation.  Ultrasound indicates no recurrence on left thyroid lobectomy site.  No significant findings on right side.    The following portions of the patient's history were reviewed and updated as appropriate: allergies, current medications, past family history, past medical history, past social history, past surgical history and problem list.      Review of Systems   Constitutional: Positive for fatigue. Negative for chills, fever and unexpected weight change.   HENT: Negative for hearing loss, trouble swallowing and voice change.    Eyes: Negative for visual disturbance.   Respiratory: Negative for apnea, cough, chest tightness, shortness of breath and wheezing.    Cardiovascular: Positive for palpitations. Negative for chest pain and leg swelling.   Gastrointestinal: Negative for abdominal distention, abdominal pain, anal bleeding, blood in stool, constipation, diarrhea, nausea, rectal pain and vomiting.   Endocrine: Negative for cold intolerance and heat intolerance.   Genitourinary: Negative for difficulty urinating, dysuria and flank pain.   Musculoskeletal: Negative for back pain and gait problem.   Skin: Negative for color change, rash and wound.   Neurological: Negative for dizziness, syncope, speech difficulty, weakness, light-headedness, numbness and headaches.   Hematological: Negative for adenopathy. Does not bruise/bleed easily.   Psychiatric/Behavioral: Negative for confusion. The patient is not nervous/anxious.        Vital Signs  Vitals:    04/15/19 1326   BP: 142/84   Pulse: 78   Temp: 98.1 °F (36.7 °C)   SpO2: 98%   Weight: 81.2 kg  "(179 lb)   Height: 165.1 cm (65\")       Allergies:  Allergies   Allergen Reactions   • Latex Hives     AND ITCHING   • Tuberculin Tests Itching       Medications:    Current Outpatient Medications:   •  ACCU-CHEK FASTCLIX LANCETS misc, TEST 1-2 TIMES DAILY, Disp: 50 each, Rfl: 12  •  aspirin 81 MG chewable tablet, Chew 81 mg Daily., Disp: , Rfl:   •  ciprofloxacin (CIPRO) 500 MG tablet, Take 1 tablet by mouth twice a day x 7 days, Disp: 14 tablet, Rfl: 0  •  glucose blood (ACCU-CHEK GUIDE) test strip, Test 1-2 Times daily, Disp: 50 each, Rfl: 12  •  levothyroxine (SYNTHROID, LEVOTHROID) 25 MCG tablet, Take 1 tablet by mouth Daily., Disp: 90 tablet, Rfl: 2  •  metFORMIN ER (GLUCOPHAGE-XR) 500 MG 24 hr tablet, Take 1 tablet by mouth Daily With Breakfast., Disp: 90 tablet, Rfl: 2  •  metroNIDAZOLE (FLAGYL) 250 MG tablet, Take 1 tablet by mouth four times a day x 7 days, Disp: 28 tablet, Rfl: 0  •  Omega-3 Fatty Acids (FISH OIL) 1000 MG capsule capsule, Take  by mouth Daily With Breakfast., Disp: , Rfl:   •  Omega-3 Fatty Acids (FISH OIL) 1000 MG capsule capsule, Fish Oil  1 qd, Disp: , Rfl:   •  omeprazole (priLOSEC) 20 MG capsule, Take 20 mg by mouth Daily., Disp: , Rfl:   •  rosuvastatin (CRESTOR) 40 MG tablet, Take 40 mg by mouth Daily., Disp: , Rfl:   •  venlafaxine XR (EFFEXOR-XR) 75 MG 24 hr capsule, venlafaxine ER 75 mg capsule,extended release 24 hr  Take 1 capsule every day by oral route., Disp: , Rfl:   •  VITAMIN D, CHOLECALCIFEROL, PO, Take  by mouth Daily., Disp: , Rfl:     Physical Exam:   General Appearance:    Alert, cooperative, in no acute distress   Head:    Normocephalic, without obvious abnormality, atraumatic   Lungs:     Clear to auscultation,respirations regular, even and                  unlabored    Heart:    Regular rhythm and normal rate, normal S1 and S2, no            murmur, no gallop, no rub, no click   Abdomen:     Normal bowel sounds, no masses, no organomegaly, soft        non-tender, " non-distended, no guarding, no rebound                tenderness   Extremities:   Moves all extremities well, no edema, no cyanosis, no             redness   Pulses:   Pulses palpable and equal bilaterally   Skin:   No bleeding, bruising or rash       Results Review:   I reviewed the patient's new clinical results.     ASSESSMENT/PLAN:    1. Thyroid nodule      Status post left thyroid lobectomy, doing well.  No evidence of recurrence on ultrasound examination.  No significant right thyroid nodules.  Recommend repeat study in 1 year.    Electronically signed by Pao John MD  04/15/19

## 2019-04-18 ENCOUNTER — TRANSCRIBE ORDERS (OUTPATIENT)
Dept: CARDIOLOGY | Facility: HOSPITAL | Age: 69
End: 2019-04-18

## 2019-04-18 DIAGNOSIS — R00.2 PALPITATIONS: Primary | ICD-10-CM

## 2019-04-22 ENCOUNTER — HOSPITAL ENCOUNTER (OUTPATIENT)
Dept: GENERAL RADIOLOGY | Facility: HOSPITAL | Age: 69
Discharge: HOME | End: 2019-04-22

## 2019-04-22 ENCOUNTER — TRANSCRIBE ORDERS (OUTPATIENT)
Dept: GENERAL RADIOLOGY | Facility: HOSPITAL | Age: 69
End: 2019-04-22

## 2019-04-22 ENCOUNTER — HOSPITAL ENCOUNTER (OUTPATIENT)
Dept: CARDIOLOGY | Facility: HOSPITAL | Age: 69
Discharge: HOME | End: 2019-04-22
Admitting: INTERNAL MEDICINE

## 2019-04-22 DIAGNOSIS — R06.00 DYSPNEA, UNSPECIFIED TYPE: Primary | ICD-10-CM

## 2019-04-22 DIAGNOSIS — R00.2 PALPITATIONS: ICD-10-CM

## 2019-04-22 DIAGNOSIS — R06.00 DYSPNEA, UNSPECIFIED TYPE: ICD-10-CM

## 2019-04-22 PROCEDURE — 93306 TTE W/DOPPLER COMPLETE: CPT | Performed by: INTERNAL MEDICINE

## 2019-04-22 PROCEDURE — 93306 TTE W/DOPPLER COMPLETE: CPT

## 2019-04-22 PROCEDURE — 71046 X-RAY EXAM CHEST 2 VIEWS: CPT

## 2019-04-23 LAB
BH CV ECHO MEAS - EF(MOD-BP): 65 %
BH CV ECHO MEAS - IVSD: 1 CM
BH CV ECHO MEAS - LA DIMENSION: 3.8 CM
BH CV ECHO MEAS - LVPWD: 1 CM
BH CV ECHO MEAS - RAP SYSTOLE: 5 MMHG
BH CV ECHO MEAS - RVSP: 28 MMHG
BH CV ECHO MEAS - TAPSE (>1.6): 2.2 CM2
LEFT ATRIUM VOLUME INDEX: 23 ML/M2
LV EF 2D ECHO EST: 65 %
MAXIMAL PREDICTED HEART RATE: 150 BPM
STRESS TARGET HR: 128 BPM

## 2019-04-30 ENCOUNTER — CONSULT (OUTPATIENT)
Dept: CARDIOLOGY | Facility: CLINIC | Age: 69
End: 2019-04-30

## 2019-04-30 VITALS
HEART RATE: 98 BPM | BODY MASS INDEX: 29.49 KG/M2 | WEIGHT: 177 LBS | SYSTOLIC BLOOD PRESSURE: 122 MMHG | HEIGHT: 65 IN | RESPIRATION RATE: 16 BRPM | OXYGEN SATURATION: 96 % | DIASTOLIC BLOOD PRESSURE: 76 MMHG

## 2019-04-30 DIAGNOSIS — E11.9 TYPE 2 DIABETES MELLITUS WITHOUT COMPLICATION, WITHOUT LONG-TERM CURRENT USE OF INSULIN (HCC): ICD-10-CM

## 2019-04-30 DIAGNOSIS — E78.00 PURE HYPERCHOLESTEROLEMIA: ICD-10-CM

## 2019-04-30 DIAGNOSIS — I35.1 NONRHEUMATIC AORTIC VALVE INSUFFICIENCY: ICD-10-CM

## 2019-04-30 DIAGNOSIS — R07.2 PRECORDIAL PAIN: Primary | ICD-10-CM

## 2019-04-30 DIAGNOSIS — R06.09 DOE (DYSPNEA ON EXERTION): ICD-10-CM

## 2019-04-30 DIAGNOSIS — R00.2 PALPITATIONS: ICD-10-CM

## 2019-04-30 DIAGNOSIS — R94.31 ABNORMAL ECG: ICD-10-CM

## 2019-04-30 PROCEDURE — 99204 OFFICE O/P NEW MOD 45 MIN: CPT | Performed by: INTERNAL MEDICINE

## 2019-04-30 RX ORDER — ATENOLOL 25 MG/1
25 TABLET ORAL 2 TIMES DAILY
Qty: 30 TABLET | Refills: 11 | Status: SHIPPED | OUTPATIENT
Start: 2019-04-30 | End: 2020-06-09 | Stop reason: ALTCHOICE

## 2019-04-30 NOTE — PROGRESS NOTES
"    Subjective:     Encounter Date:04/30/2019      Patient ID: Latonya Mei is a 68 y.o. female.    Chief Complaint: Chest Pain, shortness of breath and palpitations  HPI  This is a 68-year-old female patient who is been experiencing chest pain and palpitations since March 2019.  The patient underwent an echocardiogram which showed normal left ventricular systolic function and chamber dimensions.  There was no left ventricular hypertrophy.  Left ventricular diastolic function was normal.  There was no evidence of pericardial disease or pulmonary hypertension.  She was noted to have mild aortic regurgitation.  She has no history of rheumatic fever, childhood murmurs or infective endocarditis.  The patient also had worn a 24-hour cardiac monitor and March 2019 which showed the fastest heart rate being 126 bpm.  She was demonstrated to have rare PVCs and occasional PACs.  She was noted to have one brief episode of paroxysmal atrial tachycardia for only 6 beats.  The patient reports that her palpitations occur on a daily basis.  This occurs with activity and she has noticed heart rates up to 182 bpm.  She indicates that when walking to the mailbox her heart rate will be 130 bpm.  This is described as a sense that her heart is \"thumping\" and racing.  She reports feeling jittery in her legs with shortness of breath and dizziness.  She has had no syncopal episodes.  She has no history of documented arrhythmia.  The patient indicates that her pulse will often be around 100 bpm even when she is sitting down.  She notes that her heart rate is most rapid when she bends over and raises up.  She indicates that doing any household chores or yard work will result in what she feels is excessively high heart rate.  She reports that she tracks her heart rate with a heart rate monitor.  She indicates that the palpitations and other symptoms resolved when she sits down and rests which generally takes about 30 minutes for her heart " "rate to get below 100 bpm.  Other than exertional activities she cannot identify any other precipitating or aggravating features.  She also reports shortness of breath mainly with activity.  This tends to be associated with her palpitations but has occurred during episodes without palpitations.  She indicates that the shortness of breath will generally last 30 minutes to 1 hour.  She indicates that she feels as though she is only able to \"get 1/2 breath\".  The shortness of breath is worse when doing household chores and yard work.  It is also worse if she bends over.  Other than exertion she cannot identify any precipitating or aggravating features.  It generally resolves with rest particularly if she sits down.  She has had no orthopnea PND or lower extremity edema.  She also reports having chest discomfort which is localized to a discrete area of her mid sternum.  She reports that at times the discomfort is sharp but at other times dull.  She indicates that other times the discomfort will have an aching quality and at other times the discomfort will have a pressure quality.  She cannot recall when the chest discomfort first began.  The patient indicates that the pain most typically has a sharp shooting quality but does not radiate.  The discomfort will generally last anywhere from a few minutes to half an hour.  It typically has a 4-5/10 intensity.  The discomfort is associated with lightheadedness diaphoresis and shortness of breath.  The discomfort occurs both at rest and with activity but seems to be more common with physical exertion.  She cannot identify any consistent precipitating aggravating or alleviating features.  She has no personal history of myocardial infarction or coronary revascularization.  She indicates that her last stress test was over 10 years ago.  She has no history of hypertension but does have a history of type 2 diabetes mellitus and hyper cholesterolemia both of which are treated " appropriately.  She is a non-smoker.  Her family history is negative for premature coronary artery disease.  The following portions of the patient's history were reviewed and updated as appropriate: allergies, current medications, past family history, past medical history, past social history, past surgical history and problem  Review of Systems   Constitution: Positive for weakness and malaise/fatigue. Negative for chills, diaphoresis, fever, weight gain and weight loss.   HENT: Negative for ear discharge, hearing loss, hoarse voice and nosebleeds.    Eyes: Negative for discharge, double vision, pain and photophobia.   Cardiovascular: Positive for chest pain, dyspnea on exertion, irregular heartbeat and palpitations. Negative for claudication, cyanosis, leg swelling, near-syncope, orthopnea, paroxysmal nocturnal dyspnea and syncope.   Respiratory: Positive for shortness of breath. Negative for cough, hemoptysis, sputum production and wheezing.    Endocrine: Negative for cold intolerance, heat intolerance, polydipsia, polyphagia and polyuria.   Hematologic/Lymphatic: Negative for adenopathy and bleeding problem. Does not bruise/bleed easily.   Skin: Negative for color change, flushing, itching and rash.   Musculoskeletal: Negative for muscle cramps, muscle weakness, myalgias and stiffness.   Gastrointestinal: Negative for abdominal pain, diarrhea, hematemesis, hematochezia, nausea and vomiting.   Genitourinary: Negative for dysuria, frequency and nocturia.   Neurological: Positive for dizziness and light-headedness. Negative for focal weakness, loss of balance, numbness, paresthesias and seizures.   Psychiatric/Behavioral: Negative for altered mental status, hallucinations and suicidal ideas.   Allergic/Immunologic: Negative for HIV exposure, hives and persistent infections.           Current Outpatient Medications:   •  ACCU-CHEK FASTCLIX LANCETS misc, TEST 1-2 TIMES DAILY, Disp: 50 each, Rfl: 12  •  aspirin 81 MG  chewable tablet, Chew 81 mg Daily., Disp: , Rfl:   •  glucose blood (ACCU-CHEK GUIDE) test strip, Test 1-2 Times daily, Disp: 50 each, Rfl: 12  •  levothyroxine (SYNTHROID, LEVOTHROID) 25 MCG tablet, Take 1 tablet by mouth Daily., Disp: 90 tablet, Rfl: 2  •  metFORMIN ER (GLUCOPHAGE-XR) 500 MG 24 hr tablet, Take 1 tablet by mouth Daily With Breakfast., Disp: 90 tablet, Rfl: 2  •  Omega-3 Fatty Acids (FISH OIL) 1000 MG capsule capsule, Take  by mouth Daily With Breakfast., Disp: , Rfl:   •  Omega-3 Fatty Acids (FISH OIL) 1000 MG capsule capsule, Fish Oil  1 qd, Disp: , Rfl:   •  omeprazole (priLOSEC) 20 MG capsule, Take 20 mg by mouth Daily., Disp: , Rfl:   •  rosuvastatin (CRESTOR) 40 MG tablet, Take 40 mg by mouth Daily., Disp: , Rfl:   •  venlafaxine XR (EFFEXOR-XR) 75 MG 24 hr capsule, venlafaxine ER 75 mg capsule,extended release 24 hr  Take 1 capsule every day by oral route., Disp: , Rfl:   •  VITAMIN D, CHOLECALCIFEROL, PO, Take  by mouth Daily., Disp: , Rfl:   •  atenolol (TENORMIN) 25 MG tablet, Take 1 tablet by mouth 2 (Two) Times a Day., Disp: 30 tablet, Rfl: 11    Objective:   Physical Exam   Constitutional: She is oriented to person, place, and time. She appears well-developed and well-nourished. No distress.   HENT:   Head: Normocephalic and atraumatic.   Mouth/Throat: Oropharynx is clear and moist.   Eyes: Conjunctivae and EOM are normal. Pupils are equal, round, and reactive to light. No scleral icterus.   Neck: Normal range of motion. Neck supple. No JVD present. No tracheal deviation present. No thyromegaly present.   Cardiovascular: Normal rate, regular rhythm, S1 normal, S2 normal, normal heart sounds, intact distal pulses and normal pulses. PMI is not displaced. Exam reveals no gallop and no friction rub.   No murmur heard.  Pulmonary/Chest: Effort normal and breath sounds normal. No stridor. No respiratory distress. She has no wheezes. She has no rales.   Abdominal: Soft. Bowel sounds are  "normal. She exhibits no distension and no mass. There is no tenderness. There is no rebound and no guarding.   Musculoskeletal: Normal range of motion. She exhibits no edema or deformity.   Neurological: She is alert and oriented to person, place, and time. She displays normal reflexes. No cranial nerve deficit. Coordination normal.   Skin: Skin is warm and dry. No rash noted. She is not diaphoretic. No erythema.   Psychiatric: She has a normal mood and affect. Her behavior is normal. Thought content normal.     Blood pressure 122/76, pulse 98, resp. rate 16, height 165.1 cm (65\"), weight 80.3 kg (177 lb), SpO2 96 %, not currently breastfeeding.   Lab Review:     Assessment:       1. Precordial pain  The patient's chest discomfort has features both typical and atypical for coronary insufficiency.  The patient has never had an ischemic evaluation.  The patient has multiple risk factors for coronary artery disease.  The patient is unable to do treadmill exercise stress testing due to shortness of breath with activity and poor effort tolerance.  - Stress Test With Myocardial Perfusion Two Day    2. IBARRA (dyspnea on exertion)  This is multifactorial in etiology.  Some is certainly related to the patient's aerobic deconditioning.  Potential for unrecognized underlying lung disease has not been explored.  Her symptoms are not due to congestive heart failure as an echocardiogram shows a structurally normal heart with normal left ventricular systolic and diastolic function.  There was no evidence of significant valvular disease, pericardial disease or pulmonary hypertension.  Mild aortic regurgitation would not be expected to cause any symptoms.  This could also represent an angina equivalent.  - Stress Test With Myocardial Perfusion Two Day    3. Palpitations  Some of the patient's symptoms could be due to underlying arrhythmia and/or ectopy.  Her cardiac monitor was only for 24 hours.  She did not experience any of the " tachycardia that she describes during that monitoring cycle.  She will require a more prolonged monitoring timeframe.  - Holter Monitor - 72 Hour Up To 21 Days  - Thyroid Panel With TSH; Future    4. Type 2 diabetes mellitus without complication, without long-term current use of insulin (CMS/[facility])  This is monitored closely by her primary care provider.    5. Abnormal ECG  Her twelve-lead electrocardiogram demonstrates normal sinus rhythm with normal axis and intervals.  There is an isolated Q wave in lead III which is probably a normal variant.  She has no history or clinical syndrome compatible with a prior myocardial infarction.  Her echocardiogram shows a normal ejection fraction with no regional wall motion abnormalities.  This would effectively exclude a prior transmural\Q wave myocardial infarction.  - Stress Test With Myocardial Perfusion Two Day    6. Nonrheumatic aortic valve insufficiency  Mild by echo criteria.  Normal left ventricular cardiac chamber dimensions and muscle thickness.  Normal ejection fraction.  This is not an explanation for any of her symptoms.    7. Pure hypercholesterolemia  She is treated appropriately with statin based cholesterol-lowering therapy.    Procedures    Plan:     I have recommended a vasodilator nuclear stress test as well as a outpatient cardiac monitor of prolonged duration.  I have recommended starting atenolol 25 mg orally twice per day.  Further recommendations will be predicated on the results of her outpatient testing.  She may benefit from a formal pulmonary evaluation, particularly if her outpatient cardiovascular testing is unrevealing.

## 2019-05-03 ENCOUNTER — TELEPHONE (OUTPATIENT)
Dept: CARDIOLOGY | Facility: CLINIC | Age: 69
End: 2019-05-03

## 2019-05-14 ENCOUNTER — OFFICE VISIT (OUTPATIENT)
Dept: GASTROENTEROLOGY | Facility: CLINIC | Age: 69
End: 2019-05-14

## 2019-05-14 VITALS
DIASTOLIC BLOOD PRESSURE: 67 MMHG | TEMPERATURE: 97.3 F | SYSTOLIC BLOOD PRESSURE: 148 MMHG | WEIGHT: 180 LBS | BODY MASS INDEX: 29.99 KG/M2 | HEART RATE: 60 BPM | RESPIRATION RATE: 16 BRPM | HEIGHT: 65 IN

## 2019-05-14 DIAGNOSIS — R12 HEARTBURN: ICD-10-CM

## 2019-05-14 DIAGNOSIS — K57.30 DIVERTICULOSIS OF COLON WITHOUT HEMORRHAGE: ICD-10-CM

## 2019-05-14 DIAGNOSIS — Z12.11 COLON CANCER SCREENING: Primary | ICD-10-CM

## 2019-05-14 PROCEDURE — 99214 OFFICE O/P EST MOD 30 MIN: CPT | Performed by: INTERNAL MEDICINE

## 2019-05-14 NOTE — PROGRESS NOTES
Chief Complaint   Patient presents with   • Colon Cancer Screening     History of Present Illness     For colon cancer screening.  The patient denies recent change in bowel habits.  There is no diarrhea or constipation.  Her last colonoscopy was in 2014.  She is known to have significant tortuosity of the colon.  There is no history of abdominal pain.  There is no history of overt GI bleed (hematemesis melena or hematochezia).  The patient denies nausea or vomiting.      The patient has a history of reflux off and on for the last several years.  The reflux is moderately severe.  Symptoms are described as retrosternal burning sensation, and indigestion.  There is history of occasional regurgitative symptoms.  Frequency being several times per week.  The symptoms are worse at night.  The patient takes acid suppressive therapy with reasonable control of his symptoms.  The patient denies dysphagia or odynophagia.  There is no history of recent significant weight loss.  There is no history of liver or pancreatic disease.       Review of Systems   Constitutional: Positive for fatigue. Negative for appetite change, chills, fever and unexpected weight change.   HENT: Negative for mouth sores, nosebleeds and trouble swallowing.    Eyes: Negative for discharge and redness.   Respiratory: Positive for shortness of breath. Negative for apnea and cough.    Cardiovascular: Negative for chest pain, palpitations and leg swelling.   Gastrointestinal: Negative for abdominal distention, abdominal pain, anal bleeding, blood in stool, constipation, diarrhea, nausea and vomiting.   Endocrine: Negative for cold intolerance, heat intolerance and polydipsia.   Genitourinary: Negative for dysuria, hematuria and urgency.   Musculoskeletal: Positive for arthralgias and back pain. Negative for joint swelling and myalgias.   Skin: Negative for rash.   Allergic/Immunologic: Negative for food allergies and immunocompromised state.   Neurological:  Positive for headaches. Negative for dizziness, seizures and syncope.   Hematological: Negative for adenopathy. Does not bruise/bleed easily.   Psychiatric/Behavioral: Negative for dysphoric mood. The patient is not nervous/anxious and is not hyperactive.      Patient Active Problem List   Diagnosis   • Thyroid cyst   • Postoperative hypothyroidism   • Type 2 diabetes mellitus without complication, without long-term current use of insulin (CMS/[facility])   • Precordial pain   • IBARRA (dyspnea on exertion)   • Palpitations   • Abnormal ECG   • Nonrheumatic aortic valve insufficiency   • Pure hypercholesterolemia     Past Medical History:   Diagnosis Date   • Arrhythmia 2019   • Arthritis    • Arthritis    • Body piercing     EARS   • Cataracts, bilateral     SMALL   • Depression    • Difficulty swallowing    • Diverticulitis    • Female cystocele    • Fibromyalgia    • Fibromyalgia 2007   • Full dentures    • GERD (gastroesophageal reflux disease)    • History of Holter monitoring    • History of prolapse of bladder     REPAIRED WITH SURGERY   • History of stomach ulcers    • Hyperlipidemia    • Palpitations 2019   • Scoliosis    • Snores    • Type 2 diabetes mellitus without complication (CMS/HCC) 1/16/2018   • Vertigo 2007   • Wears contact lenses    • Wears glasses      Past Surgical History:   Procedure Laterality Date   • APPENDECTOMY      WITH TUBAL   • BLADDER SUSPENSION     • CARPAL TUNNEL RELEASE Right    • COLONOSCOPY     • CYSTOCELE REPAIR      STAGE 3   • ESOPHAGOSCOPY / EGD     • HYSTERECTOMY      VAGINAL/OVARIES LEFT INSIDE   • THYROIDECTOMY Left 12/6/2017    Procedure: THYROIDECTOMY LEFT;  Surgeon: Pao John MD;  Location: [facility];  Service:    • TUBAL ABDOMINAL LIGATION      1980   • WISDOM TOOTH EXTRACTION       Family History   Problem Relation Age of Onset   • Breast cancer Sister    • Arthritis Sister    • Cancer Sister    • Diabetes Sister    • Breast cancer Other    • Arthritis Mother    • Heart  "attack Mother    • Osteoporosis Mother    • Arthritis Father    • Lung cancer Father    • Prostate cancer Father    • Diabetes Brother    • Brain cancer Brother      Social History     Tobacco Use   • Smoking status: Never Smoker   • Smokeless tobacco: Never Used   Substance Use Topics   • Alcohol use: No       Current Outpatient Medications:   •  ACCU-CHEK FASTCLIX LANCETS misc, TEST 1-2 TIMES DAILY, Disp: 50 each, Rfl: 12  •  aspirin 81 MG chewable tablet, Chew 81 mg Daily., Disp: , Rfl:   •  atenolol (TENORMIN) 25 MG tablet, Take 1 tablet by mouth 2 (Two) Times a Day., Disp: 30 tablet, Rfl: 11  •  glucose blood (ACCU-CHEK GUIDE) test strip, Test 1-2 Times daily, Disp: 50 each, Rfl: 12  •  levothyroxine (SYNTHROID, LEVOTHROID) 25 MCG tablet, Take 1 tablet by mouth Daily., Disp: 90 tablet, Rfl: 2  •  metFORMIN ER (GLUCOPHAGE-XR) 500 MG 24 hr tablet, Take 1 tablet by mouth Daily With Breakfast., Disp: 90 tablet, Rfl: 2  •  Omega-3 Fatty Acids (FISH OIL) 1000 MG capsule capsule, Take  by mouth Daily With Breakfast., Disp: , Rfl:   •  omeprazole (priLOSEC) 20 MG capsule, Take 20 mg by mouth Daily., Disp: , Rfl:   •  rosuvastatin (CRESTOR) 40 MG tablet, Take 40 mg by mouth Daily., Disp: , Rfl:   •  venlafaxine XR (EFFEXOR-XR) 75 MG 24 hr capsule, venlafaxine ER 75 mg capsule,extended release 24 hr  Take 1 capsule every day by oral route., Disp: , Rfl:   •  VITAMIN D, CHOLECALCIFEROL, PO, Take  by mouth Daily., Disp: , Rfl:     Allergies   Allergen Reactions   • Latex Hives     AND ITCHING   • Tuberculin Tests Itching       Blood pressure 148/67, pulse 60, temperature 97.3 °F (36.3 °C), resp. rate 16, height 165.1 cm (65\"), weight 81.6 kg (180 lb), not currently breastfeeding.    Physical Exam   Constitutional: She is oriented to person, place, and time. She appears well-developed and well-nourished. No distress.   HENT:   Head: Normocephalic and atraumatic.   Right Ear: Hearing and external ear normal.   Left Ear: " Hearing and external ear normal.   Nose: Nose normal.   Mouth/Throat: Oropharynx is clear and moist and mucous membranes are normal. Mucous membranes are not pale, not dry and not cyanotic. No oral lesions. No oropharyngeal exudate.   Eyes: Conjunctivae and EOM are normal. Right eye exhibits no discharge. Left eye exhibits no discharge. No scleral icterus.   Neck: Trachea normal. Neck supple. No JVD present. No edema present. No thyroid mass and no thyromegaly present.   Cardiovascular: Normal rate, regular rhythm, S2 normal and normal heart sounds. Exam reveals no gallop, no S3 and no friction rub.   No murmur heard.  Pulmonary/Chest: Effort normal and breath sounds normal. No respiratory distress. She has no wheezes. She has no rales. She exhibits no tenderness.   Abdominal: Soft. Normal appearance and bowel sounds are normal. She exhibits no distension, no ascites and no mass. There is no splenomegaly or hepatomegaly. There is no tenderness. There is no rigidity, no rebound and no guarding. No hernia.   Musculoskeletal: She exhibits no tenderness or deformity.     Vascular Status -  Her right foot exhibits no edema. Her left foot exhibits no edema.  Lymphadenopathy:     She has no cervical adenopathy.        Left: No supraclavicular adenopathy present.   Neurological: She is alert and oriented to person, place, and time. She has normal strength. No cranial nerve deficit or sensory deficit. She exhibits normal muscle tone. Coordination normal.   Skin: No rash noted. She is not diaphoretic. No cyanosis. No pallor. Nails show no clubbing.   Psychiatric: She has a normal mood and affect. Her behavior is normal. Judgment and thought content normal.   Nursing note and vitals reviewed.  Stigmata of chronic liver disease:  None.  Asterixis:  None.    Laboratory Testing:  Upon review of medical records:     Dated January 16, 2018 hepatitis C antibody nonreactive.     Dated April 17, 2018 TSH 3.19.  Hemoglobin A1c  "7.0%.     Procedures:  Upon review of medical records:     Dated February 11, 2014 the patient underwent a colonoscopy to the terminal ileum which revealed: Left-sided diverticulosis and internal hemorrhoids.  No biopsy.     Assessment:      ICD-10-CM ICD-9-CM   1. Colon cancer screening Z12.11 V76.51   2. Heartburn R12 787.1   3. Diverticulosis of colon without hemorrhage K57.30 562.10         Discussion:  1.     Plan/  Patient Instructions   1. Antireflux measures.  2. Low-fat high-fiber diet with liberal water intake.  3. Prilosec(Omeprazole) DR capsule 40 mg. Take one capsule in the morning half an hour before eating daily.     4. The patient will call back regarding scheduling of colonoscopy once she finishes her \"cardiac monitoring\".    5. Colonoscopy: Description of the procedure, risks benefits alternatives and options including non-operative options were discussed with the patient in detail.  The patient understands and wishes to proceed.           Chevy Bee MD    "

## 2019-05-14 NOTE — PATIENT INSTRUCTIONS
"1. Antireflux measures.  2. Low-fat high-fiber diet with liberal water intake.  3. Prilosec(Omeprazole) DR capsule 40 mg. Take one capsule in the morning half an hour before eating daily.     4. The patient will call back regarding scheduling of colonoscopy once she finishes her \"cardiac monitoring\".    5. Colonoscopy: Description of the procedure, risks benefits alternatives and options including non-operative options were discussed with the patient in detail.  The patient understands and wishes to proceed.      "

## 2019-05-20 ENCOUNTER — HOSPITAL ENCOUNTER (OUTPATIENT)
Dept: NUCLEAR MEDICINE | Facility: HOSPITAL | Age: 69
Discharge: HOME | End: 2019-05-20

## 2019-05-20 PROCEDURE — A9500 TC99M SESTAMIBI: HCPCS | Performed by: INTERNAL MEDICINE

## 2019-05-20 PROCEDURE — 93017 CV STRESS TEST TRACING ONLY: CPT

## 2019-05-20 PROCEDURE — 78452 HT MUSCLE IMAGE SPECT MULT: CPT

## 2019-05-20 PROCEDURE — 93018 CV STRESS TEST I&R ONLY: CPT | Performed by: INTERNAL MEDICINE

## 2019-05-20 PROCEDURE — 25010000002 REGADENOSON 0.4 MG/5ML SOLUTION: Performed by: INTERNAL MEDICINE

## 2019-05-20 PROCEDURE — 78452 HT MUSCLE IMAGE SPECT MULT: CPT | Performed by: INTERNAL MEDICINE

## 2019-05-20 PROCEDURE — 0 TECHNETIUM SESTAMIBI: Performed by: INTERNAL MEDICINE

## 2019-05-20 RX ADMIN — TECHNETIUM TC 99M SESTAMIBI 1 DOSE: 1 INJECTION INTRAVENOUS at 07:48

## 2019-05-20 RX ADMIN — REGADENOSON 0.4 MG: 0.08 INJECTION, SOLUTION INTRAVENOUS at 07:48

## 2019-05-21 ENCOUNTER — HOSPITAL ENCOUNTER (OUTPATIENT)
Dept: NUCLEAR MEDICINE | Facility: HOSPITAL | Age: 69
Discharge: HOME | End: 2019-05-21

## 2019-05-21 LAB
BH CV STRESS COMMENTS STAGE 1: NORMAL
BH CV STRESS DOSE REGADENOSON STAGE 1: 0.4
BH CV STRESS DURATION MIN STAGE 1: 0
BH CV STRESS DURATION SEC STAGE 1: 10
BH CV STRESS PROTOCOL 1: NORMAL
BH CV STRESS RECOVERY BP: NORMAL MMHG
BH CV STRESS RECOVERY HR: 95 BPM
BH CV STRESS STAGE 1: 1
LV EF NUC BP: 61 %
MAXIMAL PREDICTED HEART RATE: 150 BPM
PERCENT MAX PREDICTED HR: 69.33 %
STRESS BASELINE BP: NORMAL MMHG
STRESS BASELINE HR: 69 BPM
STRESS PERCENT HR: 82 %
STRESS POST PEAK BP: NORMAL MMHG
STRESS POST PEAK HR: 104 BPM
STRESS TARGET HR: 128 BPM

## 2019-05-21 PROCEDURE — 0 TECHNETIUM SESTAMIBI: Performed by: INTERNAL MEDICINE

## 2019-05-21 PROCEDURE — A9500 TC99M SESTAMIBI: HCPCS | Performed by: INTERNAL MEDICINE

## 2019-05-21 RX ADMIN — TECHNETIUM TC 99M SESTAMIBI 1 DOSE: 1 INJECTION INTRAVENOUS at 07:26

## 2019-05-22 ENCOUNTER — TELEPHONE (OUTPATIENT)
Dept: CARDIOLOGY | Facility: CLINIC | Age: 69
End: 2019-05-22

## 2019-05-22 NOTE — TELEPHONE ENCOUNTER
----- Message from Glen Ríos MD sent at 5/21/2019 10:47 AM EDT -----  Let her know that this was normal

## 2019-06-03 ENCOUNTER — TELEPHONE (OUTPATIENT)
Dept: GASTROENTEROLOGY | Facility: CLINIC | Age: 69
End: 2019-06-03

## 2019-06-03 NOTE — TELEPHONE ENCOUNTER
----- Message from Diogo Mathis sent at 5/31/2019  1:21 PM EDT -----  Contact: [phone]  Patient calls stating she was told to call the office after her cardiac workup was complete.  She stated it was normal.  She also stated that she has noticed her stools being black recently.  She stated she was not having any pain or other problems but thought she should mention that.  After discussion with Vika, the patient was informed she should be evaluated by her primary care physician or report to the emergency room if she was having pain, etc., as Dr. Bee is out of the office until 06/03/19.

## 2019-06-06 ENCOUNTER — APPOINTMENT (OUTPATIENT)
Dept: PREADMISSION TESTING | Facility: HOSPITAL | Age: 69
End: 2019-06-06

## 2019-06-06 ENCOUNTER — OFFICE VISIT (OUTPATIENT)
Dept: GASTROENTEROLOGY | Facility: CLINIC | Age: 69
End: 2019-06-06

## 2019-06-06 ENCOUNTER — ANESTHESIA EVENT (OUTPATIENT)
Dept: GASTROENTEROLOGY | Facility: HOSPITAL | Age: 69
End: 2019-06-06

## 2019-06-06 ENCOUNTER — PREP FOR SURGERY (OUTPATIENT)
Dept: OTHER | Facility: HOSPITAL | Age: 69
End: 2019-06-06

## 2019-06-06 VITALS
SYSTOLIC BLOOD PRESSURE: 137 MMHG | HEART RATE: 62 BPM | BODY MASS INDEX: 29.99 KG/M2 | DIASTOLIC BLOOD PRESSURE: 67 MMHG | WEIGHT: 180 LBS | TEMPERATURE: 97.6 F | RESPIRATION RATE: 16 BRPM | HEIGHT: 65 IN

## 2019-06-06 VITALS — WEIGHT: 180 LBS | BODY MASS INDEX: 29.99 KG/M2 | HEIGHT: 65 IN

## 2019-06-06 DIAGNOSIS — R10.13 EPIGASTRIC PAIN: Primary | ICD-10-CM

## 2019-06-06 DIAGNOSIS — R12 HEARTBURN: ICD-10-CM

## 2019-06-06 DIAGNOSIS — K92.1 MELENA: ICD-10-CM

## 2019-06-06 DIAGNOSIS — K92.1 MELENA: Primary | ICD-10-CM

## 2019-06-06 LAB
DEPRECATED RDW RBC AUTO: 41.2 FL (ref 37–54)
ERYTHROCYTE [DISTWIDTH] IN BLOOD BY AUTOMATED COUNT: 12.5 % (ref 12.3–15.4)
HCT VFR BLD AUTO: 38.1 % (ref 34–46.6)
HGB BLD-MCNC: 12.6 G/DL (ref 12–15.9)
MCH RBC QN AUTO: 30.1 PG (ref 26.6–33)
MCHC RBC AUTO-ENTMCNC: 33.1 G/DL (ref 31.5–35.7)
MCV RBC AUTO: 90.9 FL (ref 79–97)
PLATELET # BLD AUTO: 245 10*3/MM3 (ref 140–450)
PMV BLD AUTO: 10.4 FL (ref 6–12)
RBC # BLD AUTO: 4.19 10*6/MM3 (ref 3.77–5.28)
WBC NRBC COR # BLD: 5.69 10*3/MM3 (ref 3.4–10.8)

## 2019-06-06 PROCEDURE — 85027 COMPLETE CBC AUTOMATED: CPT | Performed by: INTERNAL MEDICINE

## 2019-06-06 PROCEDURE — 36415 COLL VENOUS BLD VENIPUNCTURE: CPT

## 2019-06-06 PROCEDURE — 99214 OFFICE O/P EST MOD 30 MIN: CPT | Performed by: INTERNAL MEDICINE

## 2019-06-06 RX ORDER — SODIUM CHLORIDE 9 MG/ML
70 INJECTION, SOLUTION INTRAVENOUS CONTINUOUS PRN
Status: CANCELLED | OUTPATIENT
Start: 2019-06-06

## 2019-06-06 NOTE — H&P (VIEW-ONLY)
Chief Complaint   Patient presents with   • Abdominal Pain     History of Present Illness     There is history of epigastric abdominal pain off and on for the last 2-3 weeks.  The pain is gradual in onset, moderate in severity and aching in character.  Frequency being 2-3 times a day.  The pain may last for several minutes.  There is no radiation of abdominal pain.  The epigastric abdominal burning sensation is also triggered by beta-blocker.  The patient has taken Mylanta which helps her.  The patient of interest is on baby aspirin a day.  There is history of peptic ulcer disease in the past.  The patient has history of black tarry appearing stools.  She denies dizziness.  There is no bright red blood per rectum.  She denies nausea or vomiting. The patient has a history of reflux off and on for the last several years.  The reflux is moderately severe.  Symptoms are described as retrosternal burning sensation, and indigestion.  There is history of occasional regurgitative symptoms.  Frequency being several times per week.  The symptoms are worse at night.  The patient takes acid suppressive therapy with breakthrough symptoms recently.    The patient denies diarrhea or constipation.  There is no dysphagia.  She denies recent weight loss.  There is no hematemesis or hematochezia.  She denies nosebleeds.  There is no recent weight loss.  There is no history of liver or pancreatic disease.       Review of Systems   Constitutional: Negative for appetite change, chills, fatigue, fever and unexpected weight change.   HENT: Negative for mouth sores, nosebleeds and trouble swallowing.    Eyes: Negative for discharge and redness.   Respiratory: Negative for apnea, cough and shortness of breath.    Cardiovascular: Negative for chest pain, palpitations and leg swelling.   Gastrointestinal: Positive for abdominal pain. Negative for abdominal distention, anal bleeding, blood in stool, constipation, diarrhea, nausea and vomiting.         Melena   Endocrine: Negative for cold intolerance, heat intolerance and polydipsia.   Genitourinary: Negative for dysuria, hematuria and urgency.   Musculoskeletal: Positive for arthralgias and myalgias. Negative for joint swelling.   Skin: Negative for rash.   Allergic/Immunologic: Negative for food allergies and immunocompromised state.   Neurological: Negative for dizziness, seizures, syncope and headaches.   Hematological: Negative for adenopathy. Does not bruise/bleed easily.   Psychiatric/Behavioral: Negative for dysphoric mood. The patient is not nervous/anxious and is not hyperactive.      Patient Active Problem List   Diagnosis   • Thyroid cyst   • Postoperative hypothyroidism   • Type 2 diabetes mellitus without complication, without long-term current use of insulin (CMS/Formerly [facility])   • Precordial pain   • IBARRA (dyspnea on exertion)   • Palpitations   • Abnormal ECG   • Nonrheumatic aortic valve insufficiency   • Pure hypercholesterolemia     Past Medical History:   Diagnosis Date   • Arrhythmia 2019   • Arthritis    • Arthritis    • Body piercing     EARS   • Cataracts, bilateral     SMALL   • Depression    • Difficulty swallowing    • Diverticulitis    • Female cystocele    • Fibromyalgia    • Fibromyalgia 2007   • Full dentures    • GERD (gastroesophageal reflux disease)    • History of Holter monitoring    • History of prolapse of bladder     REPAIRED WITH SURGERY   • History of stomach ulcers    • Hyperlipidemia    • Palpitations 2019   • Scoliosis    • Snores    • Type 2 diabetes mellitus without complication (CMS/Formerly [facility]) 1/16/2018   • Vertigo 2007   • Wears contact lenses    • Wears glasses      Past Surgical History:   Procedure Laterality Date   • APPENDECTOMY      WITH TUBAL   • BLADDER SUSPENSION     • CARPAL TUNNEL RELEASE Right    • COLONOSCOPY     • CYSTOCELE REPAIR      STAGE 3   • ESOPHAGOSCOPY / EGD     • HYSTERECTOMY      VAGINAL/OVARIES LEFT INSIDE   • THYROIDECTOMY Left 12/6/2017     Procedure: THYROIDECTOMY LEFT;  Surgeon: Pao John MD;  Location: [facility];  Service:    • TUBAL ABDOMINAL LIGATION      1980   • WISDOM TOOTH EXTRACTION       Family History   Problem Relation Age of Onset   • Breast cancer Sister    • Arthritis Sister    • Cancer Sister    • Diabetes Sister    • Breast cancer Other    • Arthritis Mother    • Heart attack Mother    • Osteoporosis Mother    • Arthritis Father    • Lung cancer Father    • Prostate cancer Father    • Diabetes Brother    • Brain cancer Brother      Social History     Tobacco Use   • Smoking status: Never Smoker   • Smokeless tobacco: Never Used   Substance Use Topics   • Alcohol use: No       Current Outpatient Medications:   •  ACCU-CHEK FASTCLIX LANCETS misc, TEST 1-2 TIMES DAILY, Disp: 50 each, Rfl: 12  •  aspirin 81 MG chewable tablet, Chew 81 mg Daily., Disp: , Rfl:   •  atenolol (TENORMIN) 25 MG tablet, Take 1 tablet by mouth 2 (Two) Times a Day., Disp: 30 tablet, Rfl: 11  •  glucose blood (ACCU-CHEK GUIDE) test strip, Test 1-2 Times daily, Disp: 50 each, Rfl: 12  •  levothyroxine (SYNTHROID, LEVOTHROID) 25 MCG tablet, Take 1 tablet by mouth Daily., Disp: 90 tablet, Rfl: 2  •  metFORMIN ER (GLUCOPHAGE-XR) 500 MG 24 hr tablet, Take 1 tablet by mouth Daily With Breakfast., Disp: 90 tablet, Rfl: 2  •  Omega-3 Fatty Acids (FISH OIL) 1000 MG capsule capsule, Take  by mouth Daily With Breakfast., Disp: , Rfl:   •  omeprazole (priLOSEC) 20 MG capsule, Take 20 mg by mouth Daily., Disp: , Rfl:   •  rosuvastatin (CRESTOR) 40 MG tablet, Take 40 mg by mouth Daily., Disp: , Rfl:   •  venlafaxine XR (EFFEXOR-XR) 75 MG 24 hr capsule, venlafaxine ER 75 mg capsule,extended release 24 hr  Take 1 capsule every day by oral route., Disp: , Rfl:   •  VITAMIN D, CHOLECALCIFEROL, PO, Take  by mouth Daily., Disp: , Rfl:     Allergies   Allergen Reactions   • Latex Hives     AND ITCHING   • Tuberculin Tests Itching       Blood pressure 137/67, pulse 62, temperature  "97.6 °F (36.4 °C), resp. rate 16, height 165.1 cm (65\"), weight 81.6 kg (180 lb), not currently breastfeeding.    Physical Exam   Constitutional: She is oriented to person, place, and time. She appears well-developed and well-nourished. No distress.   HENT:   Head: Normocephalic and atraumatic.   Right Ear: Hearing and external ear normal.   Left Ear: Hearing and external ear normal.   Nose: Nose normal.   Mouth/Throat: Oropharynx is clear and moist and mucous membranes are normal. Mucous membranes are not pale, not dry and not cyanotic. No oral lesions. No oropharyngeal exudate.   Eyes: Conjunctivae and EOM are normal. Right eye exhibits no discharge. Left eye exhibits no discharge. No scleral icterus.   Neck: Trachea normal. Neck supple. No JVD present. No edema present. No thyroid mass and no thyromegaly present.   Cardiovascular: Normal rate, regular rhythm, S2 normal and normal heart sounds. Exam reveals no gallop, no S3 and no friction rub.   No murmur heard.  Pulmonary/Chest: Effort normal and breath sounds normal. No respiratory distress. She has no wheezes. She has no rales. She exhibits no tenderness.   Abdominal: Soft. Normal appearance and bowel sounds are normal. She exhibits no distension, no ascites and no mass. There is no splenomegaly or hepatomegaly. There is tenderness in the epigastric area. There is no rigidity, no rebound and no guarding. No hernia.   Musculoskeletal: She exhibits no tenderness or deformity.     Vascular Status -  Her right foot exhibits no edema. Her left foot exhibits no edema.  Lymphadenopathy:     She has no cervical adenopathy.        Left: No supraclavicular adenopathy present.   Neurological: She is alert and oriented to person, place, and time. She has normal strength. No cranial nerve deficit or sensory deficit. She exhibits normal muscle tone. Coordination normal.   Skin: No rash noted. She is not diaphoretic. No cyanosis. No pallor. Nails show no clubbing. "   Psychiatric: She has a normal mood and affect. Her behavior is normal. Judgment and thought content normal.   Nursing note and vitals reviewed.  Stigmata of chronic liver disease:  None.  Asterixis:  None.    Laboratory Testing:  Upon review of medical records:     Dated January 16, 2018 hepatitis C antibody nonreactive.     Dated April 17, 2018 TSH 3.19.  Hemoglobin A1c 7.0%.    Dated February 5, 2019 sodium 140 potassium 4.1 chloride 104 CO2 25 BUN 19 serum creatinine 0.9 glucose 189.  Calcium 9.3.  Total protein 6.7.  Albumin 4.4.  T bili 0.7 AST 14 ALT 14 alkaline phosphatase 50.  Total cholesterol 209.  Triglycerides 235.  TSH 4.11.  Hemoglobin A1c 8.2%.  WBC 5.0 hemoglobin 12.5 hematocrit 40.3 MCV 96.6 and platelet count 265.    Dated April 30, 2019 TSH 4.40.      Procedures:  Upon review of medical records:     Dated February 11, 2014 the patient underwent a colonoscopy to the terminal ileum which revealed: Left-sided diverticulosis and internal hemorrhoids.  No biopsy.             Assessment:      ICD-10-CM ICD-9-CM   1. Epigastric pain R10.13 789.06   2. Melena K92.1 578.1   3. Heartburn R12 787.1         Discussion:  1.     Plan/  Patient Instructions   1. Protonix (Pantoprazole) tablet 20 mg tablet. Take 1 tablet orally in the morning half an hour before eating every day.  2. Check CBC.  3. Upper endoscopy (EGD) counseling:  Description of the procedure, risks, benefits, alternatives and options including nonoperative options were discussed with the patient in detail.  The patient understands and wishes to proceed.       Chevy Bee MD

## 2019-06-06 NOTE — DISCHARGE INSTRUCTIONS
Patient instructed on PAT PASS information.  Patient/family member verbalized understanding of instruction/education.PAT PASS GIVEN/REVIEWED WITH PT.  VERBALIZED UNDERSTANDING OF THE FOLLOWING:  DO NOT EAT, DRINK, SMOKE, USE SMOKELESS TOBACCO OR CHEW GUM AFTER MIDNIGHT THE NIGHT BEFORE SURGERY.  THIS ALSO INCLUDES HARD CANDIES AND MINTS.    DO NOT SHAVE THE AREA TO BE OPERATED ON AT LEAST 48 HOURS PRIOR TO THE PROCEDURE.  DO NOT WEAR MAKE UP OR NAIL POLISH.  DO NOT LEAVE IN ANY PIERCING OR WEAR JEWELRY THE DAY OF SURGERY.      DO NOT USE ADHESIVES IF YOU WEAR DENTURES.    DO NOT WEAR EYE CONTACTS; BRING IN YOUR GLASSES.    ONLY TAKE MEDICATION THE MORNING OF YOUR PROCEDURE IF INSTRUCTED BY YOUR SURGEON WITH ENOUGH WATER TO SWALLOW THE MEDICATION.  IF YOUR SURGEON DID NOT SPECIFY WHICH MEDICATIONS TO TAKE, YOU WILL NEED TO CALL THEIR OFFICE FOR FURTHER INSTRUCTIONS AND DO AS THEY INSTRUCT.    LEAVE ANYTHING YOU CONSIDER VALUABLE AT HOME.    YOU WILL NEED TO ARRANGE FOR SOMEONE TO DRIVE YOU HOME AFTER SURGERY.  IT IS RECOMMENDED THAT YOU DO NOT DRIVE, WORK, DRINK ALCOHOL OR MAKE MAJOR DECISIONS FOR AT LEAST 24 HOURS AFTER YOUR PROCEDURE IS COMPLETE.      THE DAY OF YOUR PROCEDURE, BRING IN THE FOLLOWING IF APPLICABLE:   PICTURE ID AND INSURANCE/MEDICARE OR MEDICAID CARDS   COPY OF ADVANCED DIRECTIVE/LIVING WILL/POWER OR    CPAP/BIPAP/INHALERS   SKIN PREP SHEET   YOUR PREADMISSION TESTING PASS (IF NOT A PHONE HISTORY)

## 2019-06-06 NOTE — PATIENT INSTRUCTIONS
1. Protonix (Pantoprazole) tablet 20 mg tablet. Take 1 tablet orally in the morning half an hour before eating every day.  2. Check CBC.  3. Upper endoscopy (EGD) counseling:  Description of the procedure, risks, benefits, alternatives and options including nonoperative options were discussed with the patient in detail.  The patient understands and wishes to proceed.

## 2019-06-06 NOTE — PROGRESS NOTES
Chief Complaint   Patient presents with   • Abdominal Pain     History of Present Illness     There is history of epigastric abdominal pain off and on for the last 2-3 weeks.  The pain is gradual in onset, moderate in severity and aching in character.  Frequency being 2-3 times a day.  The pain may last for several minutes.  There is no radiation of abdominal pain.  The epigastric abdominal burning sensation is also triggered by beta-blocker.  The patient has taken Mylanta which helps her.  The patient of interest is on baby aspirin a day.  There is history of peptic ulcer disease in the past.  The patient has history of black tarry appearing stools.  She denies dizziness.  There is no bright red blood per rectum.  She denies nausea or vomiting. The patient has a history of reflux off and on for the last several years.  The reflux is moderately severe.  Symptoms are described as retrosternal burning sensation, and indigestion.  There is history of occasional regurgitative symptoms.  Frequency being several times per week.  The symptoms are worse at night.  The patient takes acid suppressive therapy with breakthrough symptoms recently.    The patient denies diarrhea or constipation.  There is no dysphagia.  She denies recent weight loss.  There is no hematemesis or hematochezia.  She denies nosebleeds.  There is no recent weight loss.  There is no history of liver or pancreatic disease.       Review of Systems   Constitutional: Negative for appetite change, chills, fatigue, fever and unexpected weight change.   HENT: Negative for mouth sores, nosebleeds and trouble swallowing.    Eyes: Negative for discharge and redness.   Respiratory: Negative for apnea, cough and shortness of breath.    Cardiovascular: Negative for chest pain, palpitations and leg swelling.   Gastrointestinal: Positive for abdominal pain. Negative for abdominal distention, anal bleeding, blood in stool, constipation, diarrhea, nausea and vomiting.         Melena   Endocrine: Negative for cold intolerance, heat intolerance and polydipsia.   Genitourinary: Negative for dysuria, hematuria and urgency.   Musculoskeletal: Positive for arthralgias and myalgias. Negative for joint swelling.   Skin: Negative for rash.   Allergic/Immunologic: Negative for food allergies and immunocompromised state.   Neurological: Negative for dizziness, seizures, syncope and headaches.   Hematological: Negative for adenopathy. Does not bruise/bleed easily.   Psychiatric/Behavioral: Negative for dysphoric mood. The patient is not nervous/anxious and is not hyperactive.      Patient Active Problem List   Diagnosis   • Thyroid cyst   • Postoperative hypothyroidism   • Type 2 diabetes mellitus without complication, without long-term current use of insulin (CMS/Former[facility])   • Precordial pain   • IBARRA (dyspnea on exertion)   • Palpitations   • Abnormal ECG   • Nonrheumatic aortic valve insufficiency   • Pure hypercholesterolemia     Past Medical History:   Diagnosis Date   • Arrhythmia 2019   • Arthritis    • Arthritis    • Body piercing     EARS   • Cataracts, bilateral     SMALL   • Depression    • Difficulty swallowing    • Diverticulitis    • Female cystocele    • Fibromyalgia    • Fibromyalgia 2007   • Full dentures    • GERD (gastroesophageal reflux disease)    • History of Holter monitoring    • History of prolapse of bladder     REPAIRED WITH SURGERY   • History of stomach ulcers    • Hyperlipidemia    • Palpitations 2019   • Scoliosis    • Snores    • Type 2 diabetes mellitus without complication (CMS/Formerly [facility]) 1/16/2018   • Vertigo 2007   • Wears contact lenses    • Wears glasses      Past Surgical History:   Procedure Laterality Date   • APPENDECTOMY      WITH TUBAL   • BLADDER SUSPENSION     • CARPAL TUNNEL RELEASE Right    • COLONOSCOPY     • CYSTOCELE REPAIR      STAGE 3   • ESOPHAGOSCOPY / EGD     • HYSTERECTOMY      VAGINAL/OVARIES LEFT INSIDE   • THYROIDECTOMY Left 12/6/2017     Procedure: THYROIDECTOMY LEFT;  Surgeon: Pao John MD;  Location: [facility];  Service:    • TUBAL ABDOMINAL LIGATION      1980   • WISDOM TOOTH EXTRACTION       Family History   Problem Relation Age of Onset   • Breast cancer Sister    • Arthritis Sister    • Cancer Sister    • Diabetes Sister    • Breast cancer Other    • Arthritis Mother    • Heart attack Mother    • Osteoporosis Mother    • Arthritis Father    • Lung cancer Father    • Prostate cancer Father    • Diabetes Brother    • Brain cancer Brother      Social History     Tobacco Use   • Smoking status: Never Smoker   • Smokeless tobacco: Never Used   Substance Use Topics   • Alcohol use: No       Current Outpatient Medications:   •  ACCU-CHEK FASTCLIX LANCETS misc, TEST 1-2 TIMES DAILY, Disp: 50 each, Rfl: 12  •  aspirin 81 MG chewable tablet, Chew 81 mg Daily., Disp: , Rfl:   •  atenolol (TENORMIN) 25 MG tablet, Take 1 tablet by mouth 2 (Two) Times a Day., Disp: 30 tablet, Rfl: 11  •  glucose blood (ACCU-CHEK GUIDE) test strip, Test 1-2 Times daily, Disp: 50 each, Rfl: 12  •  levothyroxine (SYNTHROID, LEVOTHROID) 25 MCG tablet, Take 1 tablet by mouth Daily., Disp: 90 tablet, Rfl: 2  •  metFORMIN ER (GLUCOPHAGE-XR) 500 MG 24 hr tablet, Take 1 tablet by mouth Daily With Breakfast., Disp: 90 tablet, Rfl: 2  •  Omega-3 Fatty Acids (FISH OIL) 1000 MG capsule capsule, Take  by mouth Daily With Breakfast., Disp: , Rfl:   •  omeprazole (priLOSEC) 20 MG capsule, Take 20 mg by mouth Daily., Disp: , Rfl:   •  rosuvastatin (CRESTOR) 40 MG tablet, Take 40 mg by mouth Daily., Disp: , Rfl:   •  venlafaxine XR (EFFEXOR-XR) 75 MG 24 hr capsule, venlafaxine ER 75 mg capsule,extended release 24 hr  Take 1 capsule every day by oral route., Disp: , Rfl:   •  VITAMIN D, CHOLECALCIFEROL, PO, Take  by mouth Daily., Disp: , Rfl:     Allergies   Allergen Reactions   • Latex Hives     AND ITCHING   • Tuberculin Tests Itching       Blood pressure 137/67, pulse 62, temperature  "97.6 °F (36.4 °C), resp. rate 16, height 165.1 cm (65\"), weight 81.6 kg (180 lb), not currently breastfeeding.    Physical Exam   Constitutional: She is oriented to person, place, and time. She appears well-developed and well-nourished. No distress.   HENT:   Head: Normocephalic and atraumatic.   Right Ear: Hearing and external ear normal.   Left Ear: Hearing and external ear normal.   Nose: Nose normal.   Mouth/Throat: Oropharynx is clear and moist and mucous membranes are normal. Mucous membranes are not pale, not dry and not cyanotic. No oral lesions. No oropharyngeal exudate.   Eyes: Conjunctivae and EOM are normal. Right eye exhibits no discharge. Left eye exhibits no discharge. No scleral icterus.   Neck: Trachea normal. Neck supple. No JVD present. No edema present. No thyroid mass and no thyromegaly present.   Cardiovascular: Normal rate, regular rhythm, S2 normal and normal heart sounds. Exam reveals no gallop, no S3 and no friction rub.   No murmur heard.  Pulmonary/Chest: Effort normal and breath sounds normal. No respiratory distress. She has no wheezes. She has no rales. She exhibits no tenderness.   Abdominal: Soft. Normal appearance and bowel sounds are normal. She exhibits no distension, no ascites and no mass. There is no splenomegaly or hepatomegaly. There is tenderness in the epigastric area. There is no rigidity, no rebound and no guarding. No hernia.   Musculoskeletal: She exhibits no tenderness or deformity.     Vascular Status -  Her right foot exhibits no edema. Her left foot exhibits no edema.  Lymphadenopathy:     She has no cervical adenopathy.        Left: No supraclavicular adenopathy present.   Neurological: She is alert and oriented to person, place, and time. She has normal strength. No cranial nerve deficit or sensory deficit. She exhibits normal muscle tone. Coordination normal.   Skin: No rash noted. She is not diaphoretic. No cyanosis. No pallor. Nails show no clubbing. "   Psychiatric: She has a normal mood and affect. Her behavior is normal. Judgment and thought content normal.   Nursing note and vitals reviewed.  Stigmata of chronic liver disease:  None.  Asterixis:  None.    Laboratory Testing:  Upon review of medical records:     Dated January 16, 2018 hepatitis C antibody nonreactive.     Dated April 17, 2018 TSH 3.19.  Hemoglobin A1c 7.0%.    Dated February 5, 2019 sodium 140 potassium 4.1 chloride 104 CO2 25 BUN 19 serum creatinine 0.9 glucose 189.  Calcium 9.3.  Total protein 6.7.  Albumin 4.4.  T bili 0.7 AST 14 ALT 14 alkaline phosphatase 50.  Total cholesterol 209.  Triglycerides 235.  TSH 4.11.  Hemoglobin A1c 8.2%.  WBC 5.0 hemoglobin 12.5 hematocrit 40.3 MCV 96.6 and platelet count 265.    Dated April 30, 2019 TSH 4.40.      Procedures:  Upon review of medical records:     Dated February 11, 2014 the patient underwent a colonoscopy to the terminal ileum which revealed: Left-sided diverticulosis and internal hemorrhoids.  No biopsy.             Assessment:      ICD-10-CM ICD-9-CM   1. Epigastric pain R10.13 789.06   2. Melena K92.1 578.1   3. Heartburn R12 787.1         Discussion:  1.     Plan/  Patient Instructions   1. Protonix (Pantoprazole) tablet 20 mg tablet. Take 1 tablet orally in the morning half an hour before eating every day.  2. Check CBC.  3. Upper endoscopy (EGD) counseling:  Description of the procedure, risks, benefits, alternatives and options including nonoperative options were discussed with the patient in detail.  The patient understands and wishes to proceed.       Chevy Bee MD

## 2019-06-07 ENCOUNTER — ANESTHESIA (OUTPATIENT)
Dept: GASTROENTEROLOGY | Facility: HOSPITAL | Age: 69
End: 2019-06-07

## 2019-06-07 ENCOUNTER — HOSPITAL ENCOUNTER (OUTPATIENT)
Facility: HOSPITAL | Age: 69
Setting detail: HOSPITAL OUTPATIENT SURGERY
Discharge: HOME | End: 2019-06-07
Attending: INTERNAL MEDICINE | Admitting: INTERNAL MEDICINE

## 2019-06-07 VITALS
HEART RATE: 65 BPM | SYSTOLIC BLOOD PRESSURE: 139 MMHG | TEMPERATURE: 97.2 F | DIASTOLIC BLOOD PRESSURE: 63 MMHG | RESPIRATION RATE: 20 BRPM | OXYGEN SATURATION: 100 %

## 2019-06-07 DIAGNOSIS — K92.1 MELENA: ICD-10-CM

## 2019-06-07 DIAGNOSIS — R10.13 EPIGASTRIC PAIN: ICD-10-CM

## 2019-06-07 LAB — GLUCOSE BLDC GLUCOMTR-MCNC: 136 MG/DL (ref 70–130)

## 2019-06-07 PROCEDURE — 82962 GLUCOSE BLOOD TEST: CPT

## 2019-06-07 PROCEDURE — 25010000002 PROPOFOL 10 MG/ML EMULSION: Performed by: NURSE ANESTHETIST, CERTIFIED REGISTERED

## 2019-06-07 PROCEDURE — 43239 EGD BIOPSY SINGLE/MULTIPLE: CPT | Performed by: INTERNAL MEDICINE

## 2019-06-07 PROCEDURE — 25010000002 MIDAZOLAM PER 1 MG: Performed by: NURSE ANESTHETIST, CERTIFIED REGISTERED

## 2019-06-07 RX ORDER — MEPERIDINE HYDROCHLORIDE 50 MG/ML
INJECTION INTRAMUSCULAR; INTRAVENOUS; SUBCUTANEOUS AS NEEDED
Status: DISCONTINUED | OUTPATIENT
Start: 2019-06-07 | End: 2019-06-07 | Stop reason: SURG

## 2019-06-07 RX ORDER — MIDAZOLAM HYDROCHLORIDE 1 MG/ML
INJECTION INTRAMUSCULAR; INTRAVENOUS AS NEEDED
Status: DISCONTINUED | OUTPATIENT
Start: 2019-06-07 | End: 2019-06-07 | Stop reason: SURG

## 2019-06-07 RX ORDER — SIMETHICONE 20 MG/.3ML
EMULSION ORAL AS NEEDED
Status: DISCONTINUED | OUTPATIENT
Start: 2019-06-07 | End: 2019-06-07 | Stop reason: HOSPADM

## 2019-06-07 RX ORDER — SODIUM CHLORIDE 0.9 % (FLUSH) 0.9 %
3 SYRINGE (ML) INJECTION AS NEEDED
Status: DISCONTINUED | OUTPATIENT
Start: 2019-06-07 | End: 2019-06-07 | Stop reason: HOSPADM

## 2019-06-07 RX ORDER — PANTOPRAZOLE SODIUM 40 MG/1
TABLET, DELAYED RELEASE ORAL
Qty: 30 TABLET | Refills: 2 | Status: SHIPPED | OUTPATIENT
Start: 2019-06-07 | End: 2019-08-21 | Stop reason: ALTCHOICE

## 2019-06-07 RX ORDER — PROPOFOL 10 MG/ML
VIAL (ML) INTRAVENOUS AS NEEDED
Status: DISCONTINUED | OUTPATIENT
Start: 2019-06-07 | End: 2019-06-07 | Stop reason: SURG

## 2019-06-07 RX ORDER — LIDOCAINE HYDROCHLORIDE 20 MG/ML
INJECTION, SOLUTION INTRAVENOUS AS NEEDED
Status: DISCONTINUED | OUTPATIENT
Start: 2019-06-07 | End: 2019-06-07 | Stop reason: SURG

## 2019-06-07 RX ORDER — SODIUM CHLORIDE 9 MG/ML
70 INJECTION, SOLUTION INTRAVENOUS CONTINUOUS PRN
Status: DISCONTINUED | OUTPATIENT
Start: 2019-06-07 | End: 2019-06-07 | Stop reason: HOSPADM

## 2019-06-07 RX ADMIN — PROPOFOL 20 MG: 10 INJECTION, EMULSION INTRAVENOUS at 07:24

## 2019-06-07 RX ADMIN — SODIUM CHLORIDE 70 ML/HR: 9 INJECTION, SOLUTION INTRAVENOUS at 06:29

## 2019-06-07 RX ADMIN — MIDAZOLAM HYDROCHLORIDE 2 MG: 1 INJECTION, SOLUTION INTRAMUSCULAR; INTRAVENOUS at 07:19

## 2019-06-07 RX ADMIN — PROPOFOL 20 MG: 10 INJECTION, EMULSION INTRAVENOUS at 07:26

## 2019-06-07 RX ADMIN — PROPOFOL 20 MG: 10 INJECTION, EMULSION INTRAVENOUS at 07:29

## 2019-06-07 RX ADMIN — LIDOCAINE HYDROCHLORIDE 40 MG: 20 INJECTION, SOLUTION INTRAVENOUS at 07:24

## 2019-06-07 RX ADMIN — MEPERIDINE HYDROCHLORIDE 25 MG: 50 INJECTION, SOLUTION INTRAMUSCULAR; INTRAVENOUS; SUBCUTANEOUS at 07:24

## 2019-06-07 RX ADMIN — MEPERIDINE HYDROCHLORIDE 25 MG: 50 INJECTION, SOLUTION INTRAMUSCULAR; INTRAVENOUS; SUBCUTANEOUS at 07:21

## 2019-06-07 NOTE — ANESTHESIA PREPROCEDURE EVALUATION
Anesthesia Evaluation     Patient summary reviewed and Nursing notes reviewed   no history of anesthetic complications:  NPO Solid Status: > 8 hours  NPO Liquid Status: > 8 hours           Airway   Mallampati: I  TM distance: >3 FB  Neck ROM: full  no difficulty expected  Dental - normal exam   (+) upper dentures and lower dentures    Pulmonary - normal exam    breath sounds clear to auscultation  (+) shortness of breath, sleep apnea,   Cardiovascular - normal exam    ECG reviewed  PT is on anticoagulation therapy  Patient on routine beta blocker  Rhythm: regular  Rate: normal    (+) IBARRA, hyperlipidemia,     ROS comment: EKG NSR 72, Normal    Neuro/Psych  (+) dizziness/light headedness, psychiatric history Depression and Anxiety,     GI/Hepatic/Renal/Endo    (+)  GERD, PUD (Hx ulcers),  diabetes mellitus type 2, hypothyroidism,     Musculoskeletal     (+) arthralgias (Fibromyalgia), back pain, chronic pain, myalgias,   Abdominal    Substance History - negative use     OB/GYN negative ob/gyn ROS         Other   (+) arthritis                       Anesthesia Plan    ASA 3     general and MAC   (Risks and benefits of general anesthesia discussed including risk of aspiration, recall and dental damage. All patient questions answered.    Will continue with plan of care.)  intravenous induction   Anesthetic plan, all risks, benefits, and alternatives have been provided, discussed and informed consent has been obtained with: patient.

## 2019-06-07 NOTE — INTERVAL H&P NOTE
H&P reviewed. The patient was examined and there are no changes to the H&P.       No recent shortness of breath or chest pains.      Blood pressure 128/67, pulse 63, temperature 97.1 °F (36.2 °C), temperature source Tympanic, resp. rate 18, SpO2 95 %, not currently breastfeeding.    Chest: clear to auscultation.  Cardiac exam: No S3 no murmurs.   Abdomen: soft bowel sounds present nondistended positive epigastric tenderness.      Dated June 6, 2019 H&H stable.

## 2019-06-07 NOTE — OP NOTE
PROCEDURE:  Upper Endoscopy with biopsies and 6 cold biopsy polypectomies..    DATE OF PROCEDURE: June 7, 2019.    REFERRING PROVIDER:  Avtar Calzada MD.     INSTRUMENT:    Olympus GIF H180 J video endoscope.      INDICATIONS OF THE PROCEDURE: This is a 68-year-old white female with history of recurrent epigastric abdominal burning pain, history of reflux and melena.  Currently undergoing upper endoscopy for further evaluation.     BIOPSIES: Second portion of duodenum.  Gastric antrum, body and fundus.  6 cold biopsy polypectomies were performed for at the body of the stomach and 2 at the fundus.     MEDICATIONS:  MAC.     PHOTOGRAPHS:  Photographs were included in the medical records.     CONSENT/PREPROCEDURE EVALUATION:  Risks, benefits, alternatives and options of the procedure including risks of anesthesia/sedation were discussed and informed consent was obtained prior to the procedure. History and physical examination were performed and nothing precluded the test.     REPORT:  The patient was placed in left lateral decubitus position. Once under the influence of IV sedation, the instrument was inserted into the mouth and esophagus was intubated under direct vision without difficulty.    Visualized portions of the laryngopharyngeal areas appeared to be within normal limits.  Vocal cords however were not visualized.    Esophagus:  Z line was noted to be around 35 cm.  Erosive distal esophagitis. LA class A.  A small sliding hiatal hernia around 3 cm was noted.  No Francisco's esophagus was seen.     Stomach:  Antrum:  Erythematous gastritis.  Angulus, lesser and greater curves: Normal.  Retroflex examination: Sliding hiatal hernia.  Cardia and fundus: Small sessile gastric polyps.     Body of the stomach: Erythematous-erosive gastritis.  Good distensibility of the stomach was achieved no giant folds were noted.   Biopsies were obtained from the gastric antrum,  body and fundus.  Multiple gastric polyps 2 to  4 mm were seen at the body of the stomach and adjacent fundus.  6 were removed for at the body and 2 at the fundus.    Pylorus and pyloric channel:  normal.     Duodenum:  Bulb: Erythematous bulbar duodenitis.  Second portion: normal.  No scalloping was seen in the second portion of duodenum.  Biopsies were obtained from the second portion of duodenum.  Major ampulla was in part visualized and appeared to be within normal limits.  A periampullary diverticulum was seen.    Intervention:  None.       The upper GI tract was decompressed and the scope was pulled out of the patient. The patient tolerated the procedure well.     DIAGNOSES:     1. Erosive distal esophagitis. LA class A.  No Francisco's esophagus.  2. Sliding hiatal hernia around 3 cm.  3. Erythematous-erosive gastritis.  4. Multiple gastric polyps.  5. Erythematous bulbar duodenitis.  6. Small periampullary diverticulum.      RECOMMENDATIONS:  1.  Dietary instructions.  2.  Pantoprazole 40 mg 1 p.o. q.a.m. 1/2 hour before breakfast.  Discontinue omeprazole 20.  3.  Follow biopsies.  4.  Follow up in office.     Thank you very much for letting me participate in the care of this patient. Please do not hesitate to call me if you have any questions.

## 2019-06-07 NOTE — ANESTHESIA POSTPROCEDURE EVALUATION
Patient: Latonya Mei    Procedure Summary     Date:  06/07/19 Room / Location:  [facility] ENDOSCOPY 2 / [facility] ENDOSCOPY    Anesthesia Start:  0717 Anesthesia Stop:      Procedure:  ESOPHAGOGASTRODUODENOSCOPY with biopsy (N/A Esophagus) Diagnosis:       Epigastric pain      Melena      (Epigastric pain [R10.13])      (Melena [K92.1])    Surgeon:  Chevy Bee MD Provider:  Dov Melara CRNA    Anesthesia Type:  MAC ASA Status:  3          Anesthesia Type: MAC  Last vitals  BP   128/67 (06/07/19 0617)   Temp   97.1 °F (36.2 °C) (06/07/19 0617)   Pulse   63 (06/07/19 0617)   Resp   18 (06/07/19 0617)     SpO2   95 % (06/07/19 0617)     Post Anesthesia Care and Evaluation    Patient location during evaluation: PHASE II  Patient participation: complete - patient participated  Level of consciousness: awake  Pain score: 0  Pain management: adequate  Airway patency: patent  Anesthetic complications: No anesthetic complications  PONV Status: none  Cardiovascular status: acceptable  Respiratory status: acceptable and nasal cannula  Hydration status: acceptable    Comments: vsss resp spont, reflexes intact, responsive, report given to pacu nurse

## 2019-06-11 ENCOUNTER — OFFICE VISIT (OUTPATIENT)
Dept: CARDIOLOGY | Facility: CLINIC | Age: 69
End: 2019-06-11

## 2019-06-11 VITALS
BODY MASS INDEX: 29.99 KG/M2 | DIASTOLIC BLOOD PRESSURE: 62 MMHG | SYSTOLIC BLOOD PRESSURE: 114 MMHG | HEART RATE: 69 BPM | OXYGEN SATURATION: 97 % | HEIGHT: 65 IN | WEIGHT: 180 LBS

## 2019-06-11 DIAGNOSIS — R06.09 DOE (DYSPNEA ON EXERTION): ICD-10-CM

## 2019-06-11 DIAGNOSIS — E11.9 TYPE 2 DIABETES MELLITUS WITHOUT COMPLICATION, WITHOUT LONG-TERM CURRENT USE OF INSULIN (HCC): ICD-10-CM

## 2019-06-11 DIAGNOSIS — I35.1 NONRHEUMATIC AORTIC VALVE INSUFFICIENCY: Primary | ICD-10-CM

## 2019-06-11 DIAGNOSIS — E78.00 PURE HYPERCHOLESTEROLEMIA: ICD-10-CM

## 2019-06-11 DIAGNOSIS — R10.13 EPIGASTRIC PAIN: ICD-10-CM

## 2019-06-11 DIAGNOSIS — R60.0 BILATERAL LEG EDEMA: ICD-10-CM

## 2019-06-11 DIAGNOSIS — R00.2 PALPITATIONS: ICD-10-CM

## 2019-06-11 LAB
LAB AP CASE REPORT: NORMAL
PATH REPORT.FINAL DX SPEC: NORMAL

## 2019-06-11 PROCEDURE — 99214 OFFICE O/P EST MOD 30 MIN: CPT | Performed by: INTERNAL MEDICINE

## 2019-06-11 NOTE — PROGRESS NOTES
Subjective:     Encounter Date:06/11/2019      Patient ID: Latonya Mei is a 68 y.o. female.    Chief Complaint: Epigastric discomfort  HPI  This is a 68-year-old female patient who underwent a battery of outpatient testing to evaluate atypical chest discomfort\epigastric discomfort.  The patient had a normal vasodilator nuclear stress test showing no evidence of ischemia or infarction.  The calculated ejection fraction was normal.  The patient had a normal 14-day cardiac monitor showing no evidence of arrhythmia or ectopy.  There was no bradycardia during waking hours and no heart block, conduction disturbance or pauses greater than 2.0 seconds.  An echocardiogram was performed which showed normal left ventricular systolic function with a normal ejection fraction and no regional wall motion abnormalities.  There was no cardiac chamber enlargement.  The patient was demonstrated to have mild valvular aortic regurgitation without aortic root enlargement.  Left ventricular diastolic function was normal.  There was no evidence of pulmonary hypertension or pericardial disease.  The patient continues to have retrosternal burning discomfort and epigastric pain.  She recently underwent upper endoscopy which showed severe erosive esophagitis, erosive gastritis and multiple gastric polyps.  She was also demonstrated to have a moderate hiatal hernia.  Testing for Helicobacter pylori was negative.  Biopsy showed no evidence of metaplasia or malignancy.  Her proton pump inhibitor therapy has been altered.  She remains a non-smoker.  The following portions of the patient's history were reviewed and updated as appropriate: allergies, current medications, past family history, past medical history, past social history, past surgical history and problem  Review of Systems   Constitution: Positive for weakness and malaise/fatigue. Negative for chills, diaphoresis, fever, weight gain and weight loss.   HENT: Negative for ear  discharge, hearing loss, hoarse voice and nosebleeds.    Eyes: Negative for discharge, double vision, pain and photophobia.   Cardiovascular: Negative for chest pain, claudication, cyanosis, dyspnea on exertion, irregular heartbeat, leg swelling, near-syncope, orthopnea, palpitations, paroxysmal nocturnal dyspnea and syncope.   Respiratory: Negative for cough, hemoptysis, shortness of breath, sputum production and wheezing.    Endocrine: Negative for cold intolerance, heat intolerance, polydipsia, polyphagia and polyuria.   Hematologic/Lymphatic: Negative for adenopathy and bleeding problem. Does not bruise/bleed easily.   Skin: Negative for color change, flushing, itching and rash.   Musculoskeletal: Negative for muscle cramps, muscle weakness, myalgias and stiffness.   Gastrointestinal: Positive for abdominal pain. Negative for diarrhea, hematemesis, hematochezia, nausea and vomiting.   Genitourinary: Negative for dysuria, frequency and nocturia.   Neurological: Negative for focal weakness, loss of balance, numbness, paresthesias and seizures.   Psychiatric/Behavioral: Negative for altered mental status, hallucinations and suicidal ideas.   Allergic/Immunologic: Negative for HIV exposure, hives and persistent infections.           Current Outpatient Medications:   •  ACCU-CHEK FASTCLIX LANCETS misc, TEST 1-2 TIMES DAILY, Disp: 50 each, Rfl: 12  •  aspirin 81 MG chewable tablet, Chew 81 mg Daily., Disp: , Rfl:   •  atenolol (TENORMIN) 25 MG tablet, Take 1 tablet by mouth 2 (Two) Times a Day., Disp: 30 tablet, Rfl: 11  •  glucose blood (ACCU-CHEK GUIDE) test strip, Test 1-2 Times daily, Disp: 50 each, Rfl: 12  •  levothyroxine (SYNTHROID, LEVOTHROID) 25 MCG tablet, Take 1 tablet by mouth Daily., Disp: 90 tablet, Rfl: 2  •  metFORMIN ER (GLUCOPHAGE-XR) 500 MG 24 hr tablet, Take 1 tablet by mouth Daily With Breakfast., Disp: 90 tablet, Rfl: 2  •  Omega-3 Fatty Acids (FISH OIL) 1000 MG capsule capsule, Take  by mouth  Daily With Breakfast., Disp: , Rfl:   •  pantoprazole (PROTONIX) 40 MG EC tablet, Take 1 tablet by mouth 30 minutes before breakfast daily. (Patient taking differently: 20 mg. Take 1 tablet by mouth 30 minutes before breakfast daily.), Disp: 30 tablet, Rfl: 2  •  rosuvastatin (CRESTOR) 40 MG tablet, Take 40 mg by mouth Daily., Disp: , Rfl:   •  venlafaxine XR (EFFEXOR-XR) 75 MG 24 hr capsule, venlafaxine ER 75 mg capsule,extended release 24 hr  Take 1 capsule every day by oral route., Disp: , Rfl:   •  VITAMIN D, CHOLECALCIFEROL, PO, Take  by mouth Daily., Disp: , Rfl:     Objective:   Physical Exam   Constitutional: She is oriented to person, place, and time. She appears well-developed and well-nourished. No distress.   HENT:   Head: Normocephalic and atraumatic.   Mouth/Throat: Oropharynx is clear and moist.   Eyes: Conjunctivae and EOM are normal. Pupils are equal, round, and reactive to light. No scleral icterus.   Neck: Normal range of motion. Neck supple. No JVD present. No tracheal deviation present. No thyromegaly present.   Cardiovascular: Normal rate, regular rhythm, S1 normal, S2 normal, intact distal pulses and normal pulses. PMI is not displaced. Exam reveals no gallop and no friction rub.   Murmur heard.  High-pitched blowing decrescendo early diastolic murmur is present with a grade of 1/6 at the upper right sternal border radiating to the apex.  Pulmonary/Chest: Effort normal and breath sounds normal. No stridor. No respiratory distress. She has no wheezes. She has no rales.   Abdominal: Soft. Bowel sounds are normal. She exhibits no distension and no mass. There is no tenderness. There is no rebound and no guarding.   Musculoskeletal: Normal range of motion. She exhibits no edema or deformity.   Neurological: She is alert and oriented to person, place, and time. She displays normal reflexes. No cranial nerve deficit. Coordination normal.   Skin: Skin is warm and dry. No rash noted. She is not  "diaphoretic. No erythema.   Psychiatric: She has a normal mood and affect. Her behavior is normal. Thought content normal.     Blood pressure 114/62, pulse 69, height 165.1 cm (65\"), weight 81.6 kg (180 lb), SpO2 97 %, not currently breastfeeding.   Lab Review:     Assessment:       1. Nonrheumatic aortic valve insufficiency  Mild by echo criteria.  Asymptomatic.  Normal cardiac chamber dimensions and ejection fraction.    2. Pure hypercholesterolemia  This is followed closely by her primary care provider.    3. IBARRA (dyspnea on exertion)  No evidence of a cardiac etiology to shortness of breath.  There is no significant valvular heart disease.  Mild aortic regurgitation would not explain her symptoms.  No evidence of ischemic heart disease.  Normal left ventricular systolic and diastolic function.    4. Type 2 diabetes mellitus without complication, without long-term current use of insulin (CMS/[facility])  This is followed closely by her primary care provider.    5. Epigastric pain  This is due to her recently discovered gastric and esophageal pathology.    6. Palpitations  No evidence of arrhythmia or ectopy.    7. Bilateral leg edema  No evidence of congestive heart failure.    Procedures    Plan:     The patient has been reassured regarding the benign cardiac findings.  No changes to her medication therapy have been made at today's visit.  No additional cardiovascular testing is warranted.      "

## 2019-07-03 ENCOUNTER — TRANSCRIBE ORDERS (OUTPATIENT)
Dept: MAMMOGRAPHY | Facility: HOSPITAL | Age: 69
End: 2019-07-03

## 2019-07-03 DIAGNOSIS — Z12.39 BREAST CANCER SCREENING: Primary | ICD-10-CM

## 2019-07-22 ENCOUNTER — LAB (OUTPATIENT)
Dept: LAB | Facility: HOSPITAL | Age: 69
End: 2019-07-22

## 2019-07-22 DIAGNOSIS — R00.2 PALPITATIONS: ICD-10-CM

## 2019-07-22 PROCEDURE — 84479 ASSAY OF THYROID (T3 OR T4): CPT

## 2019-07-22 PROCEDURE — 84436 ASSAY OF TOTAL THYROXINE: CPT

## 2019-07-22 PROCEDURE — 84443 ASSAY THYROID STIM HORMONE: CPT

## 2019-07-22 PROCEDURE — 36415 COLL VENOUS BLD VENIPUNCTURE: CPT

## 2019-07-23 LAB
FT4I SERPL CALC-MCNC: 1.8 (ref 1.2–4.9)
T3RU NFR SERPL: 22 % (ref 24–39)
T4 SERPL-MCNC: 8 UG/DL (ref 4.5–12)
TSH SERPL-ACNC: 5.45 UIU/ML (ref 0.45–4.5)

## 2019-07-24 ENCOUNTER — OFFICE VISIT (OUTPATIENT)
Dept: GASTROENTEROLOGY | Facility: CLINIC | Age: 69
End: 2019-07-24

## 2019-07-24 VITALS
TEMPERATURE: 97.6 F | HEART RATE: 72 BPM | BODY MASS INDEX: 28.99 KG/M2 | DIASTOLIC BLOOD PRESSURE: 65 MMHG | HEIGHT: 65 IN | SYSTOLIC BLOOD PRESSURE: 109 MMHG | RESPIRATION RATE: 18 BRPM | WEIGHT: 174 LBS

## 2019-07-24 DIAGNOSIS — R12 HEARTBURN: Primary | ICD-10-CM

## 2019-07-24 DIAGNOSIS — R05.9 COUGH: ICD-10-CM

## 2019-07-24 PROCEDURE — 99214 OFFICE O/P EST MOD 30 MIN: CPT | Performed by: INTERNAL MEDICINE

## 2019-07-24 RX ORDER — AZITHROMYCIN 250 MG/1
250 TABLET, FILM COATED ORAL DAILY
COMMUNITY
End: 2019-08-21
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
## 2019-07-24 NOTE — PATIENT INSTRUCTIONS
1. Anti-reflex measures: Low fat diet. Limit: Coffee, Chocolate and Fried Foods.   Avoid:  Sodas, High Fat Foods, Cookies, Excessive Tomato or Onions based foods,  Alcohol and Smoking).  2. Protonix (Pantoprazole) tablet 40 mg tablet. Take 1 tablet orally in the morning half an hour before eating every day.  3. The patient has been advised to continue to watch the symptoms.  If she has persistence of the symptoms of cough, wheezing and some shortness of breath she may need further evaluation including a chest x-ray.  4. The patient may take Mucinex DM 30/600 mg extended release 12-hour tablet.  Take 1 tablet orally in the morning and 1 at night.

## 2019-08-21 ENCOUNTER — OFFICE VISIT (OUTPATIENT)
Dept: GASTROENTEROLOGY | Facility: CLINIC | Age: 69
End: 2019-08-21

## 2019-08-21 VITALS
WEIGHT: 176 LBS | DIASTOLIC BLOOD PRESSURE: 72 MMHG | HEART RATE: 62 BPM | RESPIRATION RATE: 18 BRPM | BODY MASS INDEX: 29.32 KG/M2 | SYSTOLIC BLOOD PRESSURE: 118 MMHG | HEIGHT: 65 IN | TEMPERATURE: 97 F

## 2019-08-21 DIAGNOSIS — R14.2 BELCHING: ICD-10-CM

## 2019-08-21 DIAGNOSIS — R10.33 PERIUMBILICAL ABDOMINAL PAIN: ICD-10-CM

## 2019-08-21 DIAGNOSIS — R12 HEARTBURN: Primary | ICD-10-CM

## 2019-08-21 PROCEDURE — 99214 OFFICE O/P EST MOD 30 MIN: CPT | Performed by: INTERNAL MEDICINE

## 2019-08-21 RX ORDER — OMEPRAZOLE 40 MG/1
CAPSULE, DELAYED RELEASE ORAL
Qty: 30 CAPSULE | Refills: 3 | Status: SHIPPED | OUTPATIENT
Start: 2019-08-21 | End: 2019-12-23
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
## 2019-08-21 NOTE — PATIENT INSTRUCTIONS
1. Anti-reflex measures: Low fat diet. Limit: Coffee, Chocolate and Fried Foods.   Avoid:  Sodas, High Fat Foods, Cookies, Excessive Tomato or Onions based foods,  Alcohol and Smoking).  2. Low-fat-consistent carbohydrate diet.  3. Weight reduction  4. Prilosec(Omeprazole) DR capsule 40 mg. Take one capsule in the morning half an hour before eating daily.  Hold off on pantoprazole for now.  5. The patient should take thyroid medication first thing in the morning, wait for half hour then take omeprazole, wait for half hour and then eat.  6. The patient may call back in 1 week regarding progress.  Otherwise follow-up in 3 to 4 weeks.

## 2019-08-29 ENCOUNTER — HOSPITAL ENCOUNTER (OUTPATIENT)
Dept: MAMMOGRAPHY | Facility: HOSPITAL | Age: 69
Discharge: HOME | End: 2019-08-29
Admitting: INTERNAL MEDICINE

## 2019-08-29 DIAGNOSIS — Z12.39 BREAST CANCER SCREENING: ICD-10-CM

## 2019-08-29 PROCEDURE — 77063 BREAST TOMOSYNTHESIS BI: CPT

## 2019-08-29 PROCEDURE — 77067 SCR MAMMO BI INCL CAD: CPT

## 2019-09-24 ENCOUNTER — OFFICE VISIT (OUTPATIENT)
Dept: GASTROENTEROLOGY | Facility: CLINIC | Age: 69
End: 2019-09-24

## 2019-09-24 VITALS
RESPIRATION RATE: 16 BRPM | SYSTOLIC BLOOD PRESSURE: 129 MMHG | HEIGHT: 66 IN | DIASTOLIC BLOOD PRESSURE: 69 MMHG | BODY MASS INDEX: 28.28 KG/M2 | WEIGHT: 176 LBS | TEMPERATURE: 97.8 F | HEART RATE: 66 BPM

## 2019-09-24 DIAGNOSIS — R10.32 LEFT LOWER QUADRANT PAIN: ICD-10-CM

## 2019-09-24 DIAGNOSIS — R12 HEARTBURN: Primary | ICD-10-CM

## 2019-09-24 PROCEDURE — 99214 OFFICE O/P EST MOD 30 MIN: CPT | Performed by: INTERNAL MEDICINE
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
## 2019-09-24 NOTE — PATIENT INSTRUCTIONS
"1. Anti-reflex measures: Low fat diet. Limit: Coffee, Chocolate and Fried Foods.   Avoid:  Sodas, High Fat Foods, Cookies, Excessive Tomato or Onions based foods,  Alcohol and Smoking).  2. Prilosec(Omeprazole) DR capsule 40 mg. Take one capsule in the morning half an hour before eating daily.     3. Low fiber diet (avoid fruits, vegetables, cereals, fiber supplements, nuts and seeds) for 5 days thereafter low-fat high-fiber diet.  4. Levaquin (levofloxacin). Take 1 tablet by mouth once a day for 7 days. Side effects were discussed.  5. Flagyl (metronidazole) tablets 250 mg. Take 1 tablet one by mouth 4 times a day for 7 days.  Side effects were discussed.  6. Avoid laxatives, enemas for next 5 days.  However, for constipation the patient may use \"stool softeners\" 1-3 per day.  7. May take probiotics such as Align.  Take one orally daily while taking antibiotics and 1-2 weeks thereafter.  8. Colonoscopy: Description of the procedure, risks benefits alternatives and options including non-operative options were discussed with the patient in detail.  The patient understands and wishes to proceed.  This may however be postponed for about 3-4 weeks.  Her last colon exam was in February 2014.  The colon was noted to be tortuous.    9. The patient may call back in 1 week regarding progress.  "

## 2019-09-25 RX ORDER — LEVOFLOXACIN 500 MG/1
500 TABLET, FILM COATED ORAL DAILY
Qty: 7 TABLET | Refills: 0 | Status: SHIPPED | OUTPATIENT
Start: 2019-09-25 | End: 2019-10-02

## 2019-09-25 RX ORDER — METRONIDAZOLE 250 MG/1
250 TABLET ORAL 4 TIMES DAILY
Qty: 28 TABLET | Refills: 0 | Status: SHIPPED | OUTPATIENT
Start: 2019-09-25 | End: 2019-10-30 | Stop reason: HOSPADM

## 2019-09-25 NOTE — TELEPHONE ENCOUNTER
Patient called.  Her antibiotics did not get sent to [facility] yesterday.

## 2019-10-21 ENCOUNTER — PREP FOR SURGERY (OUTPATIENT)
Dept: OTHER | Facility: HOSPITAL | Age: 69
End: 2019-10-21

## 2019-10-21 DIAGNOSIS — R10.32 LEFT LOWER QUADRANT PAIN: ICD-10-CM

## 2019-10-21 DIAGNOSIS — Z86.2 HISTORY OF ANEMIA: Primary | ICD-10-CM

## 2019-10-21 DIAGNOSIS — Z12.11 COLON CANCER SCREENING: ICD-10-CM

## 2019-10-21 DIAGNOSIS — R10.33 PERIUMBILICAL ABDOMINAL PAIN: ICD-10-CM

## 2019-10-21 RX ORDER — SODIUM CHLORIDE 9 MG/ML
70 INJECTION, SOLUTION INTRAVENOUS CONTINUOUS PRN
Status: CANCELLED | OUTPATIENT
Start: 2019-10-21

## 2019-10-25 PROBLEM — Z12.11 COLON CANCER SCREENING: Status: ACTIVE | Noted: 2019-10-25

## 2019-10-25 PROBLEM — Z86.2 HISTORY OF ANEMIA: Status: ACTIVE | Noted: 2019-10-25

## 2019-10-25 PROBLEM — R10.32 LEFT LOWER QUADRANT PAIN: Status: ACTIVE | Noted: 2019-10-25

## 2019-10-25 PROBLEM — R10.33 PERIUMBILICAL ABDOMINAL PAIN: Status: ACTIVE | Noted: 2019-10-25

## 2019-10-29 ENCOUNTER — PREP FOR SURGERY (OUTPATIENT)
Dept: OTHER | Facility: HOSPITAL | Age: 69
End: 2019-10-29

## 2019-10-29 DIAGNOSIS — I35.1 NONRHEUMATIC AORTIC VALVE INSUFFICIENCY: ICD-10-CM

## 2019-10-29 DIAGNOSIS — R10.814 LEFT LOWER QUADRANT ABDOMINAL TENDERNESS WITHOUT REBOUND TENDERNESS: Primary | ICD-10-CM

## 2019-10-29 RX ORDER — LEVOFLOXACIN 5 MG/ML
500 INJECTION, SOLUTION INTRAVENOUS ONCE
Status: CANCELLED | OUTPATIENT
Start: 2019-10-30

## 2019-10-29 RX ORDER — LEVOTHYROXINE SODIUM 0.03 MG/1
TABLET ORAL
Qty: 90 TABLET | Refills: 1 | OUTPATIENT
Start: 2019-10-29

## 2019-10-30 ENCOUNTER — HOSPITAL ENCOUNTER (OUTPATIENT)
Facility: HOSPITAL | Age: 69
Setting detail: HOSPITAL OUTPATIENT SURGERY
Discharge: HOME | End: 2019-10-30
Attending: INTERNAL MEDICINE | Admitting: INTERNAL MEDICINE

## 2019-10-30 ENCOUNTER — ANESTHESIA EVENT (OUTPATIENT)
Dept: GASTROENTEROLOGY | Facility: HOSPITAL | Age: 69
End: 2019-10-30

## 2019-10-30 ENCOUNTER — ANESTHESIA (OUTPATIENT)
Dept: GASTROENTEROLOGY | Facility: HOSPITAL | Age: 69
End: 2019-10-30

## 2019-10-30 VITALS
DIASTOLIC BLOOD PRESSURE: 69 MMHG | HEIGHT: 66 IN | SYSTOLIC BLOOD PRESSURE: 121 MMHG | OXYGEN SATURATION: 96 % | HEART RATE: 79 BPM | RESPIRATION RATE: 16 BRPM | TEMPERATURE: 98.5 F | WEIGHT: 174 LBS | BODY MASS INDEX: 27.97 KG/M2

## 2019-10-30 DIAGNOSIS — Z86.2 HISTORY OF ANEMIA: ICD-10-CM

## 2019-10-30 DIAGNOSIS — R10.32 LEFT LOWER QUADRANT PAIN: ICD-10-CM

## 2019-10-30 DIAGNOSIS — R10.33 PERIUMBILICAL ABDOMINAL PAIN: ICD-10-CM

## 2019-10-30 DIAGNOSIS — Z12.11 COLON CANCER SCREENING: ICD-10-CM

## 2019-10-30 DIAGNOSIS — I35.1 NONRHEUMATIC AORTIC VALVE INSUFFICIENCY: ICD-10-CM

## 2019-10-30 DIAGNOSIS — R10.814 LEFT LOWER QUADRANT ABDOMINAL TENDERNESS WITHOUT REBOUND TENDERNESS: ICD-10-CM

## 2019-10-30 LAB — GLUCOSE BLDC GLUCOMTR-MCNC: 134 MG/DL (ref 70–130)

## 2019-10-30 PROCEDURE — 25010000002 LEVOFLOXACIN PER 250 MG: Performed by: INTERNAL MEDICINE

## 2019-10-30 PROCEDURE — 45380 COLONOSCOPY AND BIOPSY: CPT | Performed by: INTERNAL MEDICINE

## 2019-10-30 PROCEDURE — 25010000002 FENTANYL CITRATE (PF) 100 MCG/2ML SOLUTION: Performed by: NURSE ANESTHETIST, CERTIFIED REGISTERED

## 2019-10-30 PROCEDURE — 25010000002 MIDAZOLAM PER 1MG: Performed by: NURSE ANESTHETIST, CERTIFIED REGISTERED

## 2019-10-30 PROCEDURE — 82962 GLUCOSE BLOOD TEST: CPT

## 2019-10-30 PROCEDURE — 25010000002 PROPOFOL 1000 MG/100ML EMULSION: Performed by: NURSE ANESTHETIST, CERTIFIED REGISTERED

## 2019-10-30 PROCEDURE — 25010000002 AMPICILLIN PER 500 MG: Performed by: INTERNAL MEDICINE

## 2019-10-30 PROCEDURE — 25010000002 PHENYLEPHRINE PER 1 ML: Performed by: NURSE ANESTHETIST, CERTIFIED REGISTERED

## 2019-10-30 PROCEDURE — S0260 H&P FOR SURGERY: HCPCS | Performed by: INTERNAL MEDICINE

## 2019-10-30 RX ORDER — SODIUM CHLORIDE 9 MG/ML
70 INJECTION, SOLUTION INTRAVENOUS CONTINUOUS PRN
Status: DISCONTINUED | OUTPATIENT
Start: 2019-10-30 | End: 2019-10-30 | Stop reason: HOSPADM

## 2019-10-30 RX ORDER — SODIUM CHLORIDE, SODIUM LACTATE, POTASSIUM CHLORIDE, CALCIUM CHLORIDE 600; 310; 30; 20 MG/100ML; MG/100ML; MG/100ML; MG/100ML
INJECTION, SOLUTION INTRAVENOUS CONTINUOUS PRN
Status: DISCONTINUED | OUTPATIENT
Start: 2019-10-30 | End: 2019-10-30 | Stop reason: SURG

## 2019-10-30 RX ORDER — MIDAZOLAM HYDROCHLORIDE 1 MG/ML
INJECTION INTRAMUSCULAR; INTRAVENOUS AS NEEDED
Status: DISCONTINUED | OUTPATIENT
Start: 2019-10-30 | End: 2019-10-30 | Stop reason: SURG

## 2019-10-30 RX ORDER — PROPOFOL 10 MG/ML
INJECTION, EMULSION INTRAVENOUS AS NEEDED
Status: DISCONTINUED | OUTPATIENT
Start: 2019-10-30 | End: 2019-10-30 | Stop reason: SURG

## 2019-10-30 RX ORDER — FENTANYL CITRATE 50 UG/ML
INJECTION, SOLUTION INTRAMUSCULAR; INTRAVENOUS AS NEEDED
Status: DISCONTINUED | OUTPATIENT
Start: 2019-10-30 | End: 2019-10-30 | Stop reason: SURG

## 2019-10-30 RX ORDER — GLYCOPYRROLATE 0.2 MG/ML
INJECTION INTRAMUSCULAR; INTRAVENOUS AS NEEDED
Status: DISCONTINUED | OUTPATIENT
Start: 2019-10-30 | End: 2019-10-30 | Stop reason: SURG

## 2019-10-30 RX ORDER — LIDOCAINE HYDROCHLORIDE 20 MG/ML
INJECTION, SOLUTION INTRAVENOUS AS NEEDED
Status: DISCONTINUED | OUTPATIENT
Start: 2019-10-30 | End: 2019-10-30 | Stop reason: SURG

## 2019-10-30 RX ORDER — LEVOFLOXACIN 5 MG/ML
500 INJECTION, SOLUTION INTRAVENOUS ONCE
Status: COMPLETED | OUTPATIENT
Start: 2019-10-30 | End: 2019-10-30

## 2019-10-30 RX ADMIN — PHENYLEPHRINE HYDROCHLORIDE 100 MCG: 10 INJECTION INTRAVENOUS at 13:11

## 2019-10-30 RX ADMIN — PROPOFOL 50 MG: 10 INJECTION, EMULSION INTRAVENOUS at 12:35

## 2019-10-30 RX ADMIN — PHENYLEPHRINE HYDROCHLORIDE 100 MCG: 10 INJECTION INTRAVENOUS at 13:03

## 2019-10-30 RX ADMIN — PROPOFOL 250 MG: 10 INJECTION, EMULSION INTRAVENOUS at 13:16

## 2019-10-30 RX ADMIN — MIDAZOLAM HYDROCHLORIDE 2 MG: 1 INJECTION INTRAMUSCULAR; INTRAVENOUS at 12:35

## 2019-10-30 RX ADMIN — LEVOFLOXACIN 500 MG: 5 INJECTION, SOLUTION INTRAVENOUS at 10:09

## 2019-10-30 RX ADMIN — GLYCOPYRROLATE 0.2 MG: 0.2 INJECTION, SOLUTION INTRAMUSCULAR; INTRAVENOUS at 13:03

## 2019-10-30 RX ADMIN — AMPICILLIN SODIUM 2 G: 2 INJECTION, POWDER, FOR SOLUTION INTRAMUSCULAR; INTRAVENOUS at 11:14

## 2019-10-30 RX ADMIN — LIDOCAINE HYDROCHLORIDE 60 MG: 20 INJECTION, SOLUTION INTRAVENOUS at 12:35

## 2019-10-30 RX ADMIN — PHENYLEPHRINE HYDROCHLORIDE 100 MCG: 10 INJECTION INTRAVENOUS at 13:08

## 2019-10-30 RX ADMIN — SODIUM CHLORIDE, POTASSIUM CHLORIDE, SODIUM LACTATE AND CALCIUM CHLORIDE: 600; 310; 30; 20 INJECTION, SOLUTION INTRAVENOUS at 12:35

## 2019-10-30 RX ADMIN — SODIUM CHLORIDE 70 ML/HR: 9 INJECTION, SOLUTION INTRAVENOUS at 10:09

## 2019-10-30 RX ADMIN — FENTANYL CITRATE 100 MCG: 50 INJECTION, SOLUTION INTRAMUSCULAR; INTRAVENOUS at 12:35

## 2019-10-30 NOTE — ANESTHESIA POSTPROCEDURE EVALUATION
Patient: Latonya Mei    Procedure Summary     Date:  10/30/19 Room / Location:  [facility] ENDOSCOPY 2 / [facility] ENDOSCOPY    Anesthesia Start:  1238 Anesthesia Stop:  1320    Procedure:  COLONOSCOPY WITH COLD FORCEP POLYPECTOMY (N/A Anus) Diagnosis:       Diverticulosis      Colon polyps      Internal hemorrhoid      (History of anemia [Z86.2])      (Left lower quadrant pain [R10.32])      (Periumbilical abdominal pain [R10.33])      (Colon cancer screening [Z12.11])    Surgeon:  Chevy Bee MD Provider:  Thompson Howard CRNA    Anesthesia Type:  MAC ASA Status:  3          Anesthesia Type: MAC  Last vitals  BP   119/61 (10/30/19 1318)   Temp   98.5 °F (36.9 °C) (10/30/19 1318)   Pulse   90 (10/30/19 1318)   Resp   16 (10/30/19 1318)     SpO2   95 % (10/30/19 1318)     Post Anesthesia Care and Evaluation    Patient location during evaluation: PHASE II  Patient participation: complete - patient participated  Level of consciousness: awake  Pain score: 1  Pain management: adequate  Airway patency: patent  Anesthetic complications: No anesthetic complications  PONV Status: controlled  Cardiovascular status: acceptable and stable  Respiratory status: acceptable  Hydration status: acceptable

## 2019-10-30 NOTE — ANESTHESIA PREPROCEDURE EVALUATION
" Anesthesia Evaluation     Patient summary reviewed and Nursing notes reviewed   no history of anesthetic complications:  NPO Solid Status: > 8 hours  NPO Liquid Status: > 6 hours           Airway   Mallampati: I  TM distance: >3 FB  Neck ROM: full  no difficulty expected  Dental - normal exam   (+) upper dentures and lower dentures    Pulmonary - normal exam    breath sounds clear to auscultation  (+) shortness of breath, sleep apnea,   Cardiovascular - normal exam  Exercise tolerance: good (4-7 METS)    ECG reviewed  PT is on anticoagulation therapy  Patient on routine beta blocker  Rhythm: regular  Rate: normal    (+) valvular problems/murmurs (\"leaky valve\"), IBARRA, hyperlipidemia,     ROS comment: EKG NSR 72, Normal    Neuro/Psych  (+) dizziness/light headedness, psychiatric history Depression and Anxiety,     GI/Hepatic/Renal/Endo    (+)  GERD well controlled, PUD (Hx ulcers),  diabetes mellitus ([id number]) type 2,     Musculoskeletal     (+) arthralgias (Fibromyalgia), back pain, chronic pain, myalgias (Fibromyalgia ),   Abdominal    Substance History - negative use     OB/GYN negative ob/gyn ROS         Other   arthritis,                      Anesthesia Plan    ASA 3     MAC   (Risks and benefits of general anesthesia discussed including risk of aspiration, recall and dental damage. All patient questions answered.    Will continue with plan of care.)  intravenous induction   Anesthetic plan, all risks, benefits, and alternatives have been provided, discussed and informed consent has been obtained with: patient.      "

## 2019-10-31 RX ORDER — LEVOTHYROXINE SODIUM 0.03 MG/1
TABLET ORAL
Qty: 90 TABLET | Refills: 1 | OUTPATIENT
Start: 2019-10-31

## 2019-11-01 LAB
LAB AP CASE REPORT: NORMAL
PATH REPORT.FINAL DX SPEC: NORMAL

## 2019-11-27 ENCOUNTER — OFFICE VISIT (OUTPATIENT)
Dept: GASTROENTEROLOGY | Facility: CLINIC | Age: 69
End: 2019-11-27

## 2019-11-27 VITALS
TEMPERATURE: 98.2 F | HEIGHT: 66 IN | RESPIRATION RATE: 16 BRPM | SYSTOLIC BLOOD PRESSURE: 128 MMHG | DIASTOLIC BLOOD PRESSURE: 65 MMHG | WEIGHT: 182 LBS | HEART RATE: 61 BPM | BODY MASS INDEX: 29.25 KG/M2

## 2019-11-27 DIAGNOSIS — K63.5 POLYP OF ASCENDING COLON, UNSPECIFIED TYPE: Primary | ICD-10-CM

## 2019-11-27 DIAGNOSIS — R12 HEARTBURN: ICD-10-CM

## 2019-11-27 DIAGNOSIS — K57.30 DIVERTICULOSIS OF COLON WITHOUT HEMORRHAGE: ICD-10-CM

## 2019-11-27 PROCEDURE — 99214 OFFICE O/P EST MOD 30 MIN: CPT | Performed by: INTERNAL MEDICINE
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
## 2019-11-27 NOTE — PATIENT INSTRUCTIONS
1. Antireflux measures.  2. Prilosec(Omeprazole) DR capsule 40 mg. Take one capsule in the morning half an hour before eating daily.    3. Low-fat-high-fiber-consistent and controlled carbohydrate diet.  4. A possible colonoscopy is recommended in 5 years in view of, tortuous and redundant colon.  October 2024.

## 2019-12-23 RX ORDER — OMEPRAZOLE 40 MG/1
CAPSULE, DELAYED RELEASE ORAL
Qty: 30 CAPSULE | Refills: 3 | Status: SHIPPED | OUTPATIENT
Start: 2019-12-23 | End: 2020-02-03

## 2020-02-03 RX ORDER — OMEPRAZOLE 40 MG/1
CAPSULE, DELAYED RELEASE ORAL
Qty: 90 CAPSULE | Refills: 1 | Status: SHIPPED | OUTPATIENT
Start: 2020-02-03 | End: 2020-09-08

## 2020-02-11 ENCOUNTER — TELEPHONE (OUTPATIENT)
Dept: CARDIOLOGY | Facility: CLINIC | Age: 70
End: 2020-02-11

## 2020-02-21 DIAGNOSIS — M25.561 RIGHT KNEE PAIN, UNSPECIFIED CHRONICITY: Primary | ICD-10-CM

## 2020-02-24 ENCOUNTER — OFFICE VISIT (OUTPATIENT)
Dept: ORTHOPEDIC SURGERY | Facility: CLINIC | Age: 70
End: 2020-02-24

## 2020-02-24 VITALS — BODY MASS INDEX: 29.25 KG/M2 | WEIGHT: 182 LBS | RESPIRATION RATE: 18 BRPM | HEIGHT: 66 IN

## 2020-02-24 DIAGNOSIS — M17.10 ARTHRITIS OF KNEE: Primary | ICD-10-CM

## 2020-02-24 DIAGNOSIS — M25.551 PAIN OF RIGHT HIP JOINT: ICD-10-CM

## 2020-02-24 DIAGNOSIS — M54.31 SCIATICA OF RIGHT SIDE: ICD-10-CM

## 2020-02-24 PROCEDURE — 99204 OFFICE O/P NEW MOD 45 MIN: CPT | Performed by: ORTHOPAEDIC SURGERY

## 2020-02-24 RX ORDER — MELOXICAM 15 MG/1
15 TABLET ORAL DAILY
Qty: 30 TABLET | Refills: 1 | Status: SHIPPED | OUTPATIENT
Start: 2020-02-24 | End: 2021-12-06
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
## 2020-02-28 ENCOUNTER — APPOINTMENT (OUTPATIENT)
Dept: MRI IMAGING | Facility: HOSPITAL | Age: 70
End: 2020-02-28

## 2020-03-03 ENCOUNTER — HOSPITAL ENCOUNTER (OUTPATIENT)
Dept: MRI IMAGING | Facility: HOSPITAL | Age: 70
Discharge: HOME | End: 2020-03-03
Admitting: ORTHOPAEDIC SURGERY

## 2020-03-03 PROCEDURE — 72148 MRI LUMBAR SPINE W/O DYE: CPT

## 2020-03-04 ENCOUNTER — OFFICE VISIT (OUTPATIENT)
Dept: ORTHOPEDIC SURGERY | Facility: CLINIC | Age: 70
End: 2020-03-04

## 2020-03-04 VITALS — HEIGHT: 66 IN | RESPIRATION RATE: 18 BRPM | BODY MASS INDEX: 29.25 KG/M2 | WEIGHT: 182 LBS

## 2020-03-04 DIAGNOSIS — M51.26 LUMBAR DISC HERNIATION: Primary | ICD-10-CM

## 2020-03-04 PROCEDURE — 99213 OFFICE O/P EST LOW 20 MIN: CPT | Performed by: ORTHOPAEDIC SURGERY
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
## 2020-06-02 NOTE — PROGRESS NOTES
Patient: Latonya Mei  YOB: 1950    Date: 06/03/2020    Primary Care Provider: Avtar Calzada MD    Chief Complaint   Patient presents with   • Follow-up     thyroid       History: I saw the patient in the office today for a 1 year follow up thyroid. Status post left thyroid lobectomy.  No evidence of recurrence on previous ultrasound examination.  No significant right thyroid nodules. She complains of hair loss and heart palpitations.. Ultrasound today indicates absent left thyroid lobe and no suspicious findings on the right lobe.  No difficulty swallowing or shortness of breath.  No difficulty breathing.  The following portions of the patient's history were reviewed and updated as appropriate: allergies, current medications, past family history, past medical history, past social history, past surgical history and problem list.    Review of Systems   Constitutional: Negative for chills, fever and unexpected weight change.   HENT: Negative for hearing loss, trouble swallowing and voice change.    Eyes: Negative for visual disturbance.   Respiratory: Negative for apnea, cough, chest tightness, shortness of breath and wheezing.    Cardiovascular: Positive for palpitations. Negative for chest pain and leg swelling.   Gastrointestinal: Negative for abdominal distention, abdominal pain, anal bleeding, blood in stool, constipation, diarrhea, nausea, rectal pain and vomiting.   Endocrine: Negative for cold intolerance and heat intolerance.   Genitourinary: Negative for difficulty urinating, dysuria and flank pain.   Musculoskeletal: Negative for back pain and gait problem.   Skin: Negative for color change, rash and wound.   Neurological: Negative for dizziness, syncope, speech difficulty, weakness, light-headedness, numbness and headaches.   Hematological: Negative for adenopathy. Does not bruise/bleed easily.   Psychiatric/Behavioral: Negative for confusion. The patient is not nervous/anxious.  "       Vital Signs  Vitals:    06/03/20 0932   BP: 144/88   Pulse: 78   Temp: 96.2 °F (35.7 °C)   SpO2: 99%   Weight: 81.8 kg (180 lb 6.4 oz)   Height: 167.6 cm (66\")       Allergies:  Allergies   Allergen Reactions   • Dust Mite Extract Cough   • Grass Cough   • Latex Hives     AND ITCHING   • Rye Cough   • Tuberculin Tests Itching   • Wheat Bran Cough       Medications:    Current Outpatient Medications:   •  ACCU-CHEK FASTCLIX LANCETS misc, TEST 1-2 TIMES DAILY, Disp: 50 each, Rfl: 12  •  aspirin 81 MG chewable tablet, Chew 81 mg Daily., Disp: , Rfl:   •  atenolol (TENORMIN) 25 MG tablet, Take 1 tablet by mouth 2 (Two) Times a Day., Disp: 30 tablet, Rfl: 11  •  glucose blood (ACCU-CHEK GUIDE) test strip, Test 1-2 Times daily, Disp: 50 each, Rfl: 12  •  levothyroxine (SYNTHROID, LEVOTHROID) 25 MCG tablet, Take 1 tablet by mouth Daily., Disp: 90 tablet, Rfl: 2  •  meloxicam (MOBIC) 15 MG tablet, Take 1 tablet by mouth Daily., Disp: 30 tablet, Rfl: 1  •  metFORMIN ER (GLUCOPHAGE-XR) 500 MG 24 hr tablet, Take 1 tablet by mouth Daily With Breakfast., Disp: 90 tablet, Rfl: 2  •  Omega-3 Fatty Acids (FISH OIL) 1000 MG capsule capsule, Take  by mouth Daily With Breakfast., Disp: , Rfl:   •  omeprazole (priLOSEC) 40 MG capsule, TAKE ONE CAPSULE BY MOUTH 30 MINUTES BEFORE BREAKFAST DAILY, Disp: 90 capsule, Rfl: 1  •  rosuvastatin (CRESTOR) 40 MG tablet, Take 40 mg by mouth Daily., Disp: , Rfl:   •  venlafaxine XR (EFFEXOR-XR) 75 MG 24 hr capsule, venlafaxine ER 75 mg capsule,extended release 24 hr  Take 1 capsule every day by oral route., Disp: , Rfl:   •  VITAMIN D, CHOLECALCIFEROL, PO, Take 2,000 Units by mouth Daily., Disp: , Rfl:     Physical Exam:   General Appearance:    Alert, cooperative, in no acute distress   Head:    Normocephalic, without obvious abnormality, atraumatic  Neck- no palpable thyroid nodule.   Lungs:     Clear to auscultation,respirations regular, even and                  unlabored    Heart:    " Regular rhythm and normal rate, normal S1 and S2, no            murmur, no gallop, no rub, no click   Abdomen:     Normal bowel sounds, no masses, no organomegaly, soft        non-tender, non-distended, no guarding, no rebound                tenderness   Extremities:   Moves all extremities well, no edema, no cyanosis, no             redness   Pulses:   Pulses palpable and equal bilaterally   Skin:   No bleeding, bruising or rash     Results Review:   I reviewed the patient's new clinical results.     Review of Systems was reviewed and confirmed as accurate as documented by the MA.    ASSESSMENT/PLAN:    1. Thyroid nodule       Status post left lobectomy, no recurrence on the left side and no evidence of a right thyroid nodule.  Follow-up in 1 year.  Patient has a small 1.2 cm left neck lymph node that is reactive in nature.    Electronically signed by Pao John MD  06/03/20

## 2020-06-03 ENCOUNTER — OFFICE VISIT (OUTPATIENT)
Dept: SURGERY | Facility: CLINIC | Age: 70
End: 2020-06-03

## 2020-06-03 VITALS
TEMPERATURE: 96.2 F | BODY MASS INDEX: 28.99 KG/M2 | WEIGHT: 180.4 LBS | HEART RATE: 78 BPM | SYSTOLIC BLOOD PRESSURE: 144 MMHG | DIASTOLIC BLOOD PRESSURE: 88 MMHG | OXYGEN SATURATION: 99 % | HEIGHT: 66 IN

## 2020-06-03 DIAGNOSIS — E04.1 THYROID NODULE: Primary | ICD-10-CM

## 2020-06-03 PROCEDURE — 99212 OFFICE O/P EST SF 10 MIN: CPT | Performed by: SURGERY

## 2020-06-09 ENCOUNTER — OFFICE VISIT (OUTPATIENT)
Dept: CARDIOLOGY | Facility: CLINIC | Age: 70
End: 2020-06-09

## 2020-06-09 VITALS
HEIGHT: 66 IN | WEIGHT: 180 LBS | HEART RATE: 70 BPM | SYSTOLIC BLOOD PRESSURE: 108 MMHG | DIASTOLIC BLOOD PRESSURE: 68 MMHG | BODY MASS INDEX: 28.93 KG/M2 | OXYGEN SATURATION: 96 %

## 2020-06-09 DIAGNOSIS — I35.1 NONRHEUMATIC AORTIC VALVE INSUFFICIENCY: ICD-10-CM

## 2020-06-09 DIAGNOSIS — E11.9 TYPE 2 DIABETES MELLITUS WITHOUT COMPLICATION, WITHOUT LONG-TERM CURRENT USE OF INSULIN (HCC): ICD-10-CM

## 2020-06-09 DIAGNOSIS — E78.00 PURE HYPERCHOLESTEROLEMIA: ICD-10-CM

## 2020-06-09 DIAGNOSIS — R94.31 ABNORMAL ECG: ICD-10-CM

## 2020-06-09 DIAGNOSIS — R00.2 PALPITATIONS: Primary | ICD-10-CM

## 2020-06-09 PROCEDURE — 99214 OFFICE O/P EST MOD 30 MIN: CPT | Performed by: NURSE PRACTITIONER

## 2020-06-09 RX ORDER — METOPROLOL SUCCINATE 50 MG/1
50 TABLET, EXTENDED RELEASE ORAL DAILY
Qty: 90 TABLET | Refills: 3 | Status: SHIPPED | OUTPATIENT
Start: 2020-06-09 | End: 2021-06-04 | Stop reason: SDUPTHER

## 2020-06-09 NOTE — PROGRESS NOTES
Latonya Mei is a 69 y.o. female.  MRN #: [id number]    Referring Provider: Avtar Calzada MD    Chief Complaint:   Chief Complaint   Patient presents with   • Follow-up   • Hypertension        History of Present Illness:  Ms Mei is a 69-year-old female that presents for one-year cardiac follow-up.  Patient has history of hyperlipidemia, palpitations, abnormal EKG, nonrheumatic aortic valve insufficiency.  From a cardiovascular standpoint, patient denies any episodes of chest pain, chest palpitations, chest pressure, diaphoresis, shortness of breath with exertion or at rest.  She monitors her blood pressure on a regular basis and has stayed consistent on her medication regimen of metoprolol 50 mg p.o. daily .  She reports that she adheres to a healthy heart diet and that she uses no added salt.  She stays active as tolerated.    The patient presents today with their self who contributes to the history of their care.     The following portions of the patient's history were reviewed and updated as appropriate: allergies, current medications, past family history, past medical history, past social history, past surgical history and problem list.     Review of Systems:     Review of Systems   Constitutional: Negative for activity change, appetite change, diaphoresis, fatigue, unexpected weight gain and unexpected weight loss.   HENT: Negative.    Eyes: Negative.  Negative for blurred vision, double vision, photophobia and visual disturbance.   Respiratory: Negative for apnea, cough, chest tightness, shortness of breath and wheezing.    Cardiovascular: Positive for chest pain and palpitations. Negative for leg swelling.   Gastrointestinal: Negative for abdominal distention, abdominal pain, blood in stool, nausea, vomiting, GERD and indigestion.   Endocrine: Negative.  Negative for cold intolerance, heat intolerance, polydipsia, polyphagia and polyuria.   Genitourinary: Negative for decreased libido, frequency,  genital sores, hematuria and urgency.   Musculoskeletal: Negative.  Negative for back pain, joint swelling, myalgias, neck pain and neck stiffness.   Skin: Negative.  Negative for color change, pallor, rash and bruise.   Allergic/Immunologic: Negative.  Negative for environmental allergies, food allergies and immunocompromised state.   Neurological: Negative for dizziness, tremors, seizures, syncope, facial asymmetry, speech difficulty, weakness, light-headedness, numbness, headache, memory problem and confusion.   Hematological: Negative for adenopathy. Does not bruise/bleed easily.   Psychiatric/Behavioral: Negative for agitation, decreased concentration, self-injury, sleep disturbance, suicidal ideas, negative for hyperactivity and stress. The patient is not nervous/anxious.    All other systems reviewed and are negative.         Current Outpatient Medications:   •  ACCU-CHEK FASTCLIX LANCETS misc, TEST 1-2 TIMES DAILY, Disp: 50 each, Rfl: 12  •  aspirin 81 MG chewable tablet, Chew 81 mg Daily., Disp: , Rfl:   •  glucose blood (ACCU-CHEK GUIDE) test strip, Test 1-2 Times daily, Disp: 50 each, Rfl: 12  •  levothyroxine (SYNTHROID, LEVOTHROID) 25 MCG tablet, Take 1 tablet by mouth Daily., Disp: 90 tablet, Rfl: 2  •  meloxicam (MOBIC) 15 MG tablet, Take 1 tablet by mouth Daily., Disp: 30 tablet, Rfl: 1  •  metFORMIN ER (GLUCOPHAGE-XR) 500 MG 24 hr tablet, Take 1 tablet by mouth Daily With Breakfast., Disp: 90 tablet, Rfl: 2  •  Omega-3 Fatty Acids (FISH OIL) 1000 MG capsule capsule, Take  by mouth Daily With Breakfast., Disp: , Rfl:   •  omeprazole (priLOSEC) 40 MG capsule, TAKE ONE CAPSULE BY MOUTH 30 MINUTES BEFORE BREAKFAST DAILY, Disp: 90 capsule, Rfl: 1  •  rosuvastatin (CRESTOR) 40 MG tablet, Take 40 mg by mouth Daily., Disp: , Rfl:   •  venlafaxine XR (EFFEXOR-XR) 75 MG 24 hr capsule, venlafaxine ER 75 mg capsule,extended release 24 hr  Take 1 capsule every day by oral route., Disp: , Rfl:   •  VITAMIN D,  "CHOLECALCIFEROL, PO, Take 2,000 Units by mouth Daily., Disp: , Rfl:   •  metoprolol succinate XL (TOPROL-XL) 50 MG 24 hr tablet, Take 1 tablet by mouth Daily., Disp: 90 tablet, Rfl: 3    Vitals:    06/09/20 1137   BP: 108/68   Pulse: 70   SpO2: 96%   Weight: 81.6 kg (180 lb)   Height: 167.6 cm (66\")       Physical Exam:     Physical Exam   Constitutional: She is oriented to person, place, and time. She appears well-developed and well-nourished. No distress.   HENT:   Head: Normocephalic and atraumatic.   Eyes: Pupils are equal, round, and reactive to light. Conjunctivae are normal. No scleral icterus.   Neck: Normal range of motion. Neck supple. No JVD present. Carotid bruit is not present.   Cardiovascular: Normal rate, regular rhythm, S1 normal, S2 normal, normal heart sounds and intact distal pulses. PMI is not displaced. Exam reveals no gallop and no friction rub.   No murmur heard.  Pulmonary/Chest: Effort normal and breath sounds normal. No respiratory distress. She has no wheezes. She has no rales. She exhibits no tenderness.   Abdominal: Soft. Bowel sounds are normal. She exhibits no mass. There is no tenderness.   Musculoskeletal: Normal range of motion. She exhibits no edema.   Neurological: She is alert and oriented to person, place, and time.   Skin: Skin is warm and dry. Capillary refill takes less than 2 seconds. No rash noted. She is not diaphoretic.   Psychiatric: She has a normal mood and affect. Her behavior is normal. Judgment and thought content normal.   Nursing note and vitals reviewed.      Procedures    Results:   Reviewed medication regimen and updated.  Vital signs are stable at 106/68, heart rate of 70 regular rate and rhythm.  After his blood pressure and weights on a daily basis and reports no deviation or abnormal weight increase or fluid retention.    Assessment/Plan:   Patient does report that she has noticed a slight increase in palpitations with exertion.  These are of short " duration and with exertion.  Will increase metoprolol from 25 to 50 mg p.o. daily  Latonya was seen today for follow-up and hypertension.    Diagnoses and all orders for this visit:    Palpitations  -     metoprolol succinate XL (TOPROL-XL) 50 MG 24 hr tablet; Take 1 tablet by mouth Daily.    Abnormal ECG  -     metoprolol succinate XL (TOPROL-XL) 50 MG 24 hr tablet; Take 1 tablet by mouth Daily.    Nonrheumatic aortic valve insufficiency  -     metoprolol succinate XL (TOPROL-XL) 50 MG 24 hr tablet; Take 1 tablet by mouth Daily.    Pure hypercholesterolemia  -     metoprolol succinate XL (TOPROL-XL) 50 MG 24 hr tablet; Take 1 tablet by mouth Daily.    Type 2 diabetes mellitus without complication, without long-term current use of insulin (CMS/[facility])  -     metoprolol succinate XL (TOPROL-XL) 50 MG 24 hr tablet; Take 1 tablet by mouth Daily.        Return in about 6 months (around 12/9/2020).    SHELLY Dillon

## 2020-07-27 ENCOUNTER — TRANSCRIBE ORDERS (OUTPATIENT)
Dept: ADMINISTRATIVE | Facility: HOSPITAL | Age: 70
End: 2020-07-27

## 2020-07-27 DIAGNOSIS — Z12.31 VISIT FOR SCREENING MAMMOGRAM: Primary | ICD-10-CM

## 2020-09-01 ENCOUNTER — HOSPITAL ENCOUNTER (OUTPATIENT)
Dept: MAMMOGRAPHY | Facility: HOSPITAL | Age: 70
Discharge: HOME | End: 2020-09-01
Admitting: INTERNAL MEDICINE

## 2020-09-01 DIAGNOSIS — Z12.31 VISIT FOR SCREENING MAMMOGRAM: ICD-10-CM

## 2020-09-01 PROCEDURE — 77063 BREAST TOMOSYNTHESIS BI: CPT

## 2020-09-01 PROCEDURE — 77067 SCR MAMMO BI INCL CAD: CPT

## 2020-09-08 RX ORDER — OMEPRAZOLE 40 MG/1
CAPSULE, DELAYED RELEASE ORAL
Qty: 30 CAPSULE | Refills: 0 | Status: SHIPPED | OUTPATIENT
Start: 2020-09-08

## 2020-12-07 ENCOUNTER — OFFICE VISIT (OUTPATIENT)
Dept: CARDIOLOGY | Facility: CLINIC | Age: 70
End: 2020-12-07

## 2020-12-07 VITALS
DIASTOLIC BLOOD PRESSURE: 60 MMHG | OXYGEN SATURATION: 98 % | BODY MASS INDEX: 28.77 KG/M2 | WEIGHT: 179 LBS | SYSTOLIC BLOOD PRESSURE: 134 MMHG | HEIGHT: 66 IN | RESPIRATION RATE: 20 BRPM | HEART RATE: 80 BPM

## 2020-12-07 DIAGNOSIS — E78.5 DYSLIPIDEMIA: ICD-10-CM

## 2020-12-07 DIAGNOSIS — R00.2 PALPITATIONS: Primary | ICD-10-CM

## 2020-12-07 DIAGNOSIS — I35.1 NONRHEUMATIC AORTIC VALVE INSUFFICIENCY: ICD-10-CM

## 2020-12-07 DIAGNOSIS — I10 ESSENTIAL HYPERTENSION: ICD-10-CM

## 2020-12-07 PROCEDURE — 99214 OFFICE O/P EST MOD 30 MIN: CPT | Performed by: INTERNAL MEDICINE

## 2020-12-07 RX ORDER — EZETIMIBE 10 MG/1
TABLET ORAL DAILY
COMMUNITY
Start: 2020-11-14

## 2020-12-07 NOTE — PROGRESS NOTES
Subjective:     Encounter Date:12/07/2020      Patient ID: Latonya Mei is a 70 y.o. female.    Chief Complaint: Aortic regurgitation  HPI  This is a 70-year-old female patient who presents to cardiology clinic for routine follow-up.  The patient indicates she has done well since her last visit.  She has had no recurrent cardiovascular symptoms, issues or hospitalizations.  She reports compliance with her medications with no perceived side effects.  The patient has no chest discomfort at rest or with activity.  There is no exertional chest arm neck jaw shoulder or back discomfort.  There is no orthopnea PND or lower extremity edema.  There is no dizziness palpitations or syncope.  The following portions of the patient's history were reviewed and updated as appropriate: allergies, current medications, past family history, past medical history, past social history, past surgical history and problem  Review of Systems   Constitution: Negative for chills, diaphoresis, fever, malaise/fatigue, weight gain and weight loss.   HENT: Negative for ear discharge, hearing loss, hoarse voice and nosebleeds.    Eyes: Negative for discharge, double vision, pain and photophobia.   Cardiovascular: Positive for palpitations. Negative for chest pain, claudication, cyanosis, dyspnea on exertion, irregular heartbeat, leg swelling, near-syncope, orthopnea, paroxysmal nocturnal dyspnea and syncope.   Respiratory: Negative for cough, hemoptysis, shortness of breath, sputum production and wheezing.    Endocrine: Negative for cold intolerance, heat intolerance, polydipsia, polyphagia and polyuria.   Hematologic/Lymphatic: Negative for adenopathy and bleeding problem. Does not bruise/bleed easily.   Skin: Negative for color change, flushing, itching and rash.   Musculoskeletal: Negative for muscle cramps, muscle weakness, myalgias and stiffness.   Gastrointestinal: Negative for abdominal pain, diarrhea, hematemesis, hematochezia,  Reason for call:  Other   Patient called regarding (reason for call): call back  Additional comments: Pt's mother Viola called to discuss cough and congestion with this 4 week old baby. The phlegm is loose, no fever, mother is concerned about the child's age. Father and sibling have had the same type of cold. Please contact Viola to discuss and advise if the pt needs to be seen.    Phone number to reach patient:  Cell number on file:    Telephone Information:   Mobile [phone]       Best Time:  Any    Can we leave a detailed message on this number?  YES     nausea and vomiting.   Genitourinary: Negative for dysuria, frequency and nocturia.   Neurological: Negative for focal weakness, loss of balance, numbness, paresthesias and seizures.   Psychiatric/Behavioral: Negative for altered mental status, hallucinations and suicidal ideas.   Allergic/Immunologic: Negative for HIV exposure, hives and persistent infections.           Current Outpatient Medications:   •  ACCU-CHEK FASTCLIX LANCETS misc, TEST 1-2 TIMES DAILY, Disp: 50 each, Rfl: 12  •  aspirin 81 MG chewable tablet, Chew 81 mg Daily., Disp: , Rfl:   •  ezetimibe (ZETIA) 10 MG tablet, Daily., Disp: , Rfl:   •  glucose blood (ACCU-CHEK GUIDE) test strip, Test 1-2 Times daily, Disp: 50 each, Rfl: 12  •  levothyroxine (SYNTHROID, LEVOTHROID) 25 MCG tablet, Take 1 tablet by mouth Daily., Disp: 90 tablet, Rfl: 2  •  meloxicam (MOBIC) 15 MG tablet, Take 1 tablet by mouth Daily., Disp: 30 tablet, Rfl: 1  •  metFORMIN ER (GLUCOPHAGE-XR) 500 MG 24 hr tablet, Take 1 tablet by mouth Daily With Breakfast. (Patient taking differently: Take 500 mg by mouth 2 (two) times a day.), Disp: 90 tablet, Rfl: 2  •  metoprolol succinate XL (TOPROL-XL) 50 MG 24 hr tablet, Take 1 tablet by mouth Daily., Disp: 90 tablet, Rfl: 3  •  Omega-3 Fatty Acids (FISH OIL) 1000 MG capsule capsule, Take  by mouth Daily With Breakfast., Disp: , Rfl:   •  omeprazole (priLOSEC) 40 MG capsule, TAKE ONE CAPSULE BY MOUTH 30 MINUTES BEFORE BREAKFAST DAILY. Needs office visit for further refills., Disp: 30 capsule, Rfl: 0  •  rosuvastatin (CRESTOR) 40 MG tablet, Take 40 mg by mouth Daily., Disp: , Rfl:   •  venlafaxine XR (EFFEXOR-XR) 75 MG 24 hr capsule, venlafaxine ER 75 mg capsule,extended release 24 hr  Take 1 capsule every day by oral route., Disp: , Rfl:   •  VITAMIN D, CHOLECALCIFEROL, PO, Take 2,000 Units by mouth Daily., Disp: , Rfl:     Objective:   Vitals signs and nursing note reviewed.   Constitutional:       Appearance: Healthy appearance. Not in  "distress.   Neck:      Vascular: No JVR. JVD normal.   Pulmonary:      Effort: Pulmonary effort is normal.      Breath sounds: Normal breath sounds. No wheezing. No rhonchi. No rales.   Chest:      Chest wall: Not tender to palpatation.   Cardiovascular:      PMI at left midclavicular line. Normal rate. Regular rhythm. Normal S1. Normal S2.      Murmurs: There is no murmur.      No gallop. No click. No rub.   Pulses:     Intact distal pulses.   Edema:     Peripheral edema absent.   Abdominal:      General: Bowel sounds are normal.      Palpations: Abdomen is soft.      Tenderness: There is no abdominal tenderness.   Musculoskeletal: Normal range of motion.         General: No tenderness.   Skin:     General: Skin is warm and dry.   Neurological:      General: No focal deficit present.      Mental Status: Alert and oriented to person, place and time.       Blood pressure 134/60, pulse 80, resp. rate 20, height 167.6 cm (65.98\"), weight 81.2 kg (179 lb), SpO2 98 %, not currently breastfeeding.   Lab Review:     Assessment:       1. Palpitations  Rare symptom frequency.  Previous evaluation showed no evidence of arrhythmia or frequent/complex atrial or ventricular ectopy.    2. Nonrheumatic aortic valve insufficiency  Mild by echo criteria.  I did not appreciate a murmur on auscultation of her heart during today's cardiac examination despite provocative maneuvers.    3. Dyslipidemia  This is followed closely by her primary care provider.    4. Essential hypertension  Acceptable blood pressure control.    Procedures    Plan:     Advance Care Planning   ACP discussion was held with the patient during this visit. Patient has an advance directive (not in EMR), copy requested.  No changes in her medications have been made at today's visit.  No additional cardiovascular testing is necessary.  Repeat echocardiogram is not warranted unless the patient has a change in symptoms or physical examination.      "

## 2021-03-19 ENCOUNTER — TRANSCRIBE ORDERS (OUTPATIENT)
Dept: ADMINISTRATIVE | Facility: HOSPITAL | Age: 71
End: 2021-03-19

## 2021-03-19 DIAGNOSIS — Z12.31 VISIT FOR SCREENING MAMMOGRAM: Primary | ICD-10-CM

## 2021-06-04 DIAGNOSIS — R94.31 ABNORMAL ECG: ICD-10-CM

## 2021-06-04 DIAGNOSIS — E78.00 PURE HYPERCHOLESTEROLEMIA: ICD-10-CM

## 2021-06-04 DIAGNOSIS — I35.1 NONRHEUMATIC AORTIC VALVE INSUFFICIENCY: ICD-10-CM

## 2021-06-04 DIAGNOSIS — E11.9 TYPE 2 DIABETES MELLITUS WITHOUT COMPLICATION, WITHOUT LONG-TERM CURRENT USE OF INSULIN (HCC): ICD-10-CM

## 2021-06-04 DIAGNOSIS — R00.2 PALPITATIONS: ICD-10-CM

## 2021-06-04 RX ORDER — METOPROLOL SUCCINATE 50 MG/1
50 TABLET, EXTENDED RELEASE ORAL DAILY
Qty: 90 TABLET | Refills: 3 | Status: SHIPPED | OUTPATIENT
Start: 2021-06-04 | End: 2022-05-27
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
## 2021-08-09 ENCOUNTER — OFFICE VISIT (OUTPATIENT)
Dept: SURGERY | Facility: CLINIC | Age: 71
End: 2021-08-09

## 2021-08-09 VITALS
WEIGHT: 175 LBS | DIASTOLIC BLOOD PRESSURE: 82 MMHG | HEIGHT: 66 IN | SYSTOLIC BLOOD PRESSURE: 130 MMHG | HEART RATE: 78 BPM | TEMPERATURE: 97.5 F | BODY MASS INDEX: 28.12 KG/M2 | OXYGEN SATURATION: 98 %

## 2021-08-09 DIAGNOSIS — E04.1 THYROID NODULE: Primary | ICD-10-CM

## 2021-08-09 PROCEDURE — 99212 OFFICE O/P EST SF 10 MIN: CPT | Performed by: SURGERY

## 2021-09-07 ENCOUNTER — HOSPITAL ENCOUNTER (OUTPATIENT)
Dept: MAMMOGRAPHY | Facility: HOSPITAL | Age: 71
Discharge: HOME | End: 2021-09-07
Admitting: INTERNAL MEDICINE

## 2021-09-07 DIAGNOSIS — Z12.31 VISIT FOR SCREENING MAMMOGRAM: ICD-10-CM

## 2021-09-07 PROCEDURE — 77067 SCR MAMMO BI INCL CAD: CPT

## 2021-09-07 PROCEDURE — 77063 BREAST TOMOSYNTHESIS BI: CPT

## 2021-11-19 ENCOUNTER — TRANSCRIBE ORDERS (OUTPATIENT)
Dept: GENERAL RADIOLOGY | Facility: HOSPITAL | Age: 71
End: 2021-11-19

## 2021-11-19 ENCOUNTER — HOSPITAL ENCOUNTER (OUTPATIENT)
Dept: GENERAL RADIOLOGY | Facility: HOSPITAL | Age: 71
Discharge: HOME | End: 2021-11-19
Admitting: NURSE PRACTITIONER

## 2021-11-19 DIAGNOSIS — M54.16 LUMBAR RADICULOPATHY: ICD-10-CM

## 2021-11-19 DIAGNOSIS — M54.6 PAIN IN THORACIC SPINE: ICD-10-CM

## 2021-11-19 DIAGNOSIS — M54.6 PAIN IN THORACIC SPINE: Primary | ICD-10-CM

## 2021-11-19 PROCEDURE — 72100 X-RAY EXAM L-S SPINE 2/3 VWS: CPT

## 2021-11-19 PROCEDURE — 72070 X-RAY EXAM THORAC SPINE 2VWS: CPT

## 2021-12-06 ENCOUNTER — OFFICE VISIT (OUTPATIENT)
Dept: CARDIOLOGY | Facility: CLINIC | Age: 71
End: 2021-12-06

## 2021-12-06 VITALS
OXYGEN SATURATION: 98 % | DIASTOLIC BLOOD PRESSURE: 60 MMHG | HEART RATE: 79 BPM | BODY MASS INDEX: 27.99 KG/M2 | WEIGHT: 168 LBS | SYSTOLIC BLOOD PRESSURE: 132 MMHG | HEIGHT: 65 IN

## 2021-12-06 DIAGNOSIS — R00.2 PALPITATIONS: ICD-10-CM

## 2021-12-06 DIAGNOSIS — E78.5 DYSLIPIDEMIA: ICD-10-CM

## 2021-12-06 DIAGNOSIS — I10 ESSENTIAL HYPERTENSION: Primary | ICD-10-CM

## 2021-12-06 DIAGNOSIS — E11.9 TYPE 2 DIABETES MELLITUS WITHOUT COMPLICATION, WITHOUT LONG-TERM CURRENT USE OF INSULIN (HCC): ICD-10-CM

## 2021-12-06 DIAGNOSIS — I35.1 NONRHEUMATIC AORTIC VALVE INSUFFICIENCY: ICD-10-CM

## 2021-12-06 PROCEDURE — 99214 OFFICE O/P EST MOD 30 MIN: CPT | Performed by: NURSE PRACTITIONER

## 2021-12-06 RX ORDER — TIZANIDINE 2 MG/1
TABLET ORAL
COMMUNITY
Start: 2021-11-19

## 2021-12-06 RX ORDER — DULAGLUTIDE 0.75 MG/.5ML
INJECTION, SOLUTION SUBCUTANEOUS
COMMUNITY
Start: 2021-11-18

## 2021-12-06 NOTE — PROGRESS NOTES
"             [facility] Cardiology Office Follow Up Note    Latonya Mei  [id number]  2021    Primary Care Provider: Avtar Calzada MD   Referring Provider: No ref. provider found    Chief Complaint: Regular follow-up.      History of Present Illness:   Mrs. Latonya Mei is a 71 y.o. female who presents to the Cardiology Clinic for regular follow-up.  The patient has a past medical history of palpitations, nonrheumatic aortic valve sufficiency, hypertension, hyperlipidemia, type 2 diabetes, and bilateral lower extremity edema.  He presents today for regular follow-up.  The patient denies any significant changes in her health or hospitalizations since her last cardiology visit.  She does report palpitations \"every now and then\" which she describes as brief.  This does not limit her activities of daily living.  She reports her blood sugars became \"pretty high there for a while\", but adds that she is doing better with her diet and home monitoring.  She continues to take her prescribed medications without perceived side effects.  She offers no other complaints or concerns at this time.       Past Cardiac Testin. Last Coronary Angio: None  2. Prior Stress Testin2019   1. Normal Lexiscan  3. Last Echo: 2019   1. Estimated EF = 65%.   2. Left ventricular systolic function is normal.   3. Mild aortic valve regurgitation is present.   4. Calculated right ventricular systolic pressure from tricuspid regurgitation is 28 mmHg.  4. Prior Holter Monitor: 2019   1. A normal monitor study.   2. No correlation between patient's symptoms and underlying arrhythmia and/or ectopy.    3. All bradycardia noted to be mild sinus bradycardia occurring during sleeping hours or asymptomatic.      Review of Systems:   Review of Systems   Constitutional: Negative for activity change, chills, diaphoresis, fatigue, fever and unexpected weight gain.   Eyes: Negative for blurred vision and " visual disturbance.   Respiratory: Negative for apnea, cough, chest tightness, shortness of breath and wheezing.    Cardiovascular: Positive for palpitations. Negative for chest pain and leg swelling.   Gastrointestinal: Negative for abdominal distention, blood in stool, GERD and indigestion.   Endocrine: Negative for cold intolerance and heat intolerance.   Genitourinary: Negative for hematuria.   Musculoskeletal: Negative for gait problem, joint swelling and myalgias.   Skin: Negative for color change, pallor and bruise.   Neurological: Negative for dizziness, seizures, syncope, weakness, light-headedness, numbness, headache and confusion.   Hematological: Does not bruise/bleed easily.   Psychiatric/Behavioral: Negative for behavioral problems, sleep disturbance, suicidal ideas and depressed mood.     I have reviewed and confirmed the accuracy of the ROS as documented by the MA/LPN/RN SHELLY Gage    I have reviewed and/or updated the patient's past medical, past surgical, family, social history, problem list and allergies as appropriate.     Medications:     Current Outpatient Medications:   •  ACCU-CHEK FASTCLIX LANCETS misc, TEST 1-2 TIMES DAILY, Disp: 50 each, Rfl: 12  •  aspirin 81 MG chewable tablet, Chew 81 mg Daily., Disp: , Rfl:   •  ezetimibe (ZETIA) 10 MG tablet, Daily., Disp: , Rfl:   •  glucose blood (ACCU-CHEK GUIDE) test strip, Test 1-2 Times daily, Disp: 50 each, Rfl: 12  •  levothyroxine (SYNTHROID, LEVOTHROID) 25 MCG tablet, Take 1 tablet by mouth Daily., Disp: 90 tablet, Rfl: 2  •  metFORMIN ER (GLUCOPHAGE-XR) 500 MG 24 hr tablet, Take 1 tablet by mouth Daily With Breakfast. (Patient taking differently: Take 500 mg by mouth 2 (two) times a day.), Disp: 90 tablet, Rfl: 2  •  metoprolol succinate XL (TOPROL-XL) 50 MG 24 hr tablet, Take 1 tablet by mouth Daily., Disp: 90 tablet, Rfl: 3  •  omeprazole (priLOSEC) 40 MG capsule, TAKE ONE CAPSULE BY MOUTH 30 MINUTES BEFORE BREAKFAST DAILY.  "Needs office visit for further refills., Disp: 30 capsule, Rfl: 0  •  rosuvastatin (CRESTOR) 40 MG tablet, Take 40 mg by mouth Daily., Disp: , Rfl:   •  tiZANidine (ZANAFLEX) 2 MG tablet, , Disp: , Rfl:   •  Trulicity 0.75 MG/0.5ML solution pen-injector, , Disp: , Rfl:   •  venlafaxine XR (EFFEXOR-XR) 75 MG 24 hr capsule, venlafaxine ER 75 mg capsule,extended release 24 hr  Take 1 capsule every day by oral route., Disp: , Rfl:   •  VITAMIN D, CHOLECALCIFEROL, PO, Take 2,000 Units by mouth Daily., Disp: , Rfl:     Physical Exam:  Vital Signs:   Vitals:    12/06/21 1108   BP: 132/60   BP Location: Right arm   Patient Position: Sitting   Cuff Size: Adult   Pulse: 79   SpO2: 98%   Weight: 76.2 kg (168 lb)   Height: 165.1 cm (65\")     Body mass index is 27.96 kg/m².    Physical Exam  Vitals and nursing note reviewed.   Constitutional:       General: She is not in acute distress.     Appearance: Normal appearance. She is well-developed.   HENT:      Head: Normocephalic and atraumatic.      Mouth/Throat:      Mouth: Mucous membranes are moist.   Eyes:      General: No scleral icterus.     Extraocular Movements: Extraocular movements intact.   Neck:      Trachea: Trachea normal.   Cardiovascular:      Rate and Rhythm: Normal rate and regular rhythm.      Pulses: Normal pulses.      Heart sounds: Normal heart sounds. No murmur heard.  No friction rub. No gallop.    Pulmonary:      Effort: Pulmonary effort is normal.      Breath sounds: Normal breath sounds.   Abdominal:      Palpations: Abdomen is soft.      Tenderness: There is no abdominal tenderness.   Musculoskeletal:         General: Normal range of motion.      Cervical back: Neck supple.      Right lower leg: No edema.      Left lower leg: No edema.   Skin:     General: Skin is warm and dry.      Findings: No bruising, lesion or rash.   Neurological:      Mental Status: She is alert and oriented to person, place, and time.      Motor: No weakness.      Gait: Gait " normal.   Psychiatric:         Mood and Affect: Mood normal.         Behavior: Behavior normal. Behavior is cooperative.         Thought Content: Thought content does not include suicidal ideation.         Results Review:   I reviewed the patient's new clinical results.      Assessment / Plan:     1. Essential hypertension (Primary)  --Acceptable on current antihypertensive    2. Dyslipidemia  --LDL goal< 100  --Continue statin + zetia    3. Palpitations  --Infrequent, brief, mild    4. Nonrheumatic aortic valve insufficiency  --4/19, Mild per echocardiogram    5. Type 2 diabetes mellitus without complication, without long-term current use of insulin (HCC)  --Medication compliant  --Regularly checks blood sugar at home      Preventative Cardiology:   Tobacco Cessation: N/A   Advance Care Planning: ACP discussion was held with the patient during this visit. Patient does not have an advance directive, declines further assistance.     Follow Up:   Return in about 1 year (around 12/6/2022) for Follow-up with Dr. Ríos.      Thank you for allowing me to participate in the care of your patient. Please to not hesitate to contact me with additional questions or concerns.     SHELLY Raman

## 2022-02-28 ENCOUNTER — HOSPITAL ENCOUNTER (OUTPATIENT)
Dept: ULTRASOUND IMAGING | Facility: HOSPITAL | Age: 72
Discharge: HOME | End: 2022-02-28
Admitting: NURSE PRACTITIONER

## 2022-02-28 ENCOUNTER — TRANSCRIBE ORDERS (OUTPATIENT)
Dept: ULTRASOUND IMAGING | Facility: HOSPITAL | Age: 72
End: 2022-02-28

## 2022-02-28 DIAGNOSIS — R11.0 NAUSEA: Primary | ICD-10-CM

## 2022-02-28 DIAGNOSIS — R11.0 NAUSEA: ICD-10-CM

## 2022-02-28 DIAGNOSIS — R10.11 RUQ PAIN: ICD-10-CM

## 2022-02-28 PROCEDURE — 76705 ECHO EXAM OF ABDOMEN: CPT

## 2022-03-08 ENCOUNTER — TRANSCRIBE ORDERS (OUTPATIENT)
Dept: ADMINISTRATIVE | Facility: HOSPITAL | Age: 72
End: 2022-03-08

## 2022-03-08 DIAGNOSIS — R10.11 RUQ ABDOMINAL PAIN: Primary | ICD-10-CM

## 2022-03-14 ENCOUNTER — APPOINTMENT (OUTPATIENT)
Dept: NUCLEAR MEDICINE | Facility: HOSPITAL | Age: 72
End: 2022-03-14

## 2022-03-14 ENCOUNTER — TRANSCRIBE ORDERS (OUTPATIENT)
Dept: ADMINISTRATIVE | Facility: HOSPITAL | Age: 72
End: 2022-03-14

## 2022-03-14 ENCOUNTER — HOSPITAL ENCOUNTER (OUTPATIENT)
Dept: NUCLEAR MEDICINE | Facility: HOSPITAL | Age: 72
Discharge: HOME | End: 2022-03-14

## 2022-03-14 ENCOUNTER — HOSPITAL ENCOUNTER (OUTPATIENT)
Dept: NUCLEAR MEDICINE | Facility: HOSPITAL | Age: 72
End: 2022-03-14

## 2022-03-14 DIAGNOSIS — R10.11 RUQ PAIN: ICD-10-CM

## 2022-03-14 DIAGNOSIS — R10.11 RUQ PAIN: Primary | ICD-10-CM

## 2022-03-14 DIAGNOSIS — R11.0 NAUSEA: ICD-10-CM

## 2022-03-14 PROCEDURE — 0 TECHNETIUM TC 99M MEBROFENIN KIT: Performed by: NURSE PRACTITIONER

## 2022-03-14 PROCEDURE — A9537 TC99M MEBROFENIN: HCPCS | Performed by: NURSE PRACTITIONER

## 2022-03-14 PROCEDURE — 78226 HEPATOBILIARY SYSTEM IMAGING: CPT

## 2022-03-14 RX ORDER — KIT FOR THE PREPARATION OF TECHNETIUM TC 99M MEBROFENIN 45 MG/10ML
1 INJECTION, POWDER, LYOPHILIZED, FOR SOLUTION INTRAVENOUS
Status: COMPLETED | OUTPATIENT
Start: 2022-03-14 | End: 2022-03-14

## 2022-03-14 RX ADMIN — MEBROFENIN 1 DOSE: 45 INJECTION, POWDER, LYOPHILIZED, FOR SOLUTION INTRAVENOUS at 12:00

## 2022-04-04 ENCOUNTER — HOSPITAL ENCOUNTER (OUTPATIENT)
Dept: GENERAL RADIOLOGY | Facility: HOSPITAL | Age: 72
Discharge: HOME | End: 2022-04-04
Admitting: INTERNAL MEDICINE

## 2022-04-04 ENCOUNTER — TRANSCRIBE ORDERS (OUTPATIENT)
Dept: HOSPITALIST | Facility: HOSPITAL | Age: 72
End: 2022-04-04

## 2022-04-04 ENCOUNTER — TRANSCRIBE ORDERS (OUTPATIENT)
Dept: GENERAL RADIOLOGY | Facility: HOSPITAL | Age: 72
End: 2022-04-04

## 2022-04-04 DIAGNOSIS — G89.29 OTHER CHRONIC PAIN: ICD-10-CM

## 2022-04-04 DIAGNOSIS — G89.29 OTHER CHRONIC PAIN: Primary | ICD-10-CM

## 2022-04-04 PROCEDURE — 72070 X-RAY EXAM THORAC SPINE 2VWS: CPT

## 2022-04-04 PROCEDURE — 71100 X-RAY EXAM RIBS UNI 2 VIEWS: CPT

## 2022-05-26 DIAGNOSIS — E11.9 TYPE 2 DIABETES MELLITUS WITHOUT COMPLICATION, WITHOUT LONG-TERM CURRENT USE OF INSULIN: ICD-10-CM

## 2022-05-26 DIAGNOSIS — R00.2 PALPITATIONS: ICD-10-CM

## 2022-05-26 DIAGNOSIS — R94.31 ABNORMAL ECG: ICD-10-CM

## 2022-05-26 DIAGNOSIS — I35.1 NONRHEUMATIC AORTIC VALVE INSUFFICIENCY: ICD-10-CM

## 2022-05-26 DIAGNOSIS — E78.00 PURE HYPERCHOLESTEROLEMIA: ICD-10-CM

## 2022-05-27 RX ORDER — METOPROLOL SUCCINATE 50 MG/1
TABLET, EXTENDED RELEASE ORAL
Qty: 90 TABLET | Refills: 3 | Status: SHIPPED | OUTPATIENT
Start: 2022-05-27

## 2022-08-02 ENCOUNTER — TRANSCRIBE ORDERS (OUTPATIENT)
Dept: ADMINISTRATIVE | Facility: HOSPITAL | Age: 72
End: 2022-08-02

## 2022-08-02 DIAGNOSIS — Z12.31 VISIT FOR SCREENING MAMMOGRAM: Primary | ICD-10-CM

## 2022-08-19 NOTE — PROGRESS NOTES
Patient: Latonya Mei  YOB: 1950    Date: 08/22/2022    Primary Care Provider: Avtar Calzada MD    Chief Complaint   Patient presents with   • Follow-up     Thyroid 1 year       History:  Patient is here for a one year follow up for her thyroid. Patient is status post left thyroid lobectomy. She states that she is doing well and has no complaints.  Ultrasound today indicates a small cyst on the right lobe, otherwise negative study.  Patient has no complaints.    The following portions of the patient's history were reviewed and updated as appropriate: allergies, current medications, past family history, past medical history, past social history, past surgical history and problem list.    Review of Systems   Constitutional: Negative for chills, fever and unexpected weight change.   HENT: Negative for hearing loss, trouble swallowing and voice change.    Eyes: Negative for visual disturbance.   Respiratory: Negative for apnea, cough, chest tightness, shortness of breath and wheezing.    Cardiovascular: Negative for chest pain, palpitations and leg swelling.   Gastrointestinal: Negative for abdominal distention, abdominal pain, anal bleeding, blood in stool, constipation, diarrhea, nausea, rectal pain and vomiting.   Endocrine: Negative for cold intolerance and heat intolerance.   Genitourinary: Negative for difficulty urinating, dysuria and flank pain.   Musculoskeletal: Negative for back pain and gait problem.   Skin: Negative for color change, rash and wound.   Neurological: Negative for dizziness, syncope, speech difficulty, weakness, light-headedness, numbness and headaches.   Hematological: Negative for adenopathy. Does not bruise/bleed easily.   Psychiatric/Behavioral: Negative for confusion. The patient is not nervous/anxious.        Vital Signs  Vitals:    08/22/22 1310   BP: 130/70   Pulse: 81   Resp: 18   Temp: 96.3 °F (35.7 °C)   TempSrc: Temporal   SpO2: 97%   Weight: 76.6 kg  "(168 lb 12.8 oz)   Height: 165.1 cm (65\")       Allergies:  Allergies   Allergen Reactions   • Dust Mite Extract Cough   • Grass Cough   • Latex Hives     AND ITCHING   • Rye Cough   • Tuberculin Tests Itching   • Wheat Bran Cough       Medications:    Current Outpatient Medications:   •  ACCU-CHEK FASTCLIX LANCETS misc, TEST 1-2 TIMES DAILY, Disp: 50 each, Rfl: 12  •  aspirin 81 MG chewable tablet, Chew 81 mg Daily., Disp: , Rfl:   •  ezetimibe (ZETIA) 10 MG tablet, Daily., Disp: , Rfl:   •  glucose blood (ACCU-CHEK GUIDE) test strip, Test 1-2 Times daily, Disp: 50 each, Rfl: 12  •  levothyroxine (SYNTHROID, LEVOTHROID) 25 MCG tablet, Take 1 tablet by mouth Daily., Disp: 90 tablet, Rfl: 2  •  metFORMIN ER (GLUCOPHAGE-XR) 500 MG 24 hr tablet, Take 1 tablet by mouth Daily With Breakfast. (Patient taking differently: Take 1,000 mg by mouth.), Disp: 90 tablet, Rfl: 2  •  metoprolol succinate XL (TOPROL-XL) 50 MG 24 hr tablet, TAKE ONE TABLET BY MOUTH DAILY, Disp: 90 tablet, Rfl: 3  •  omeprazole (priLOSEC) 40 MG capsule, TAKE ONE CAPSULE BY MOUTH 30 MINUTES BEFORE BREAKFAST DAILY. Needs office visit for further refills., Disp: 30 capsule, Rfl: 0  •  rosuvastatin (CRESTOR) 40 MG tablet, Take 40 mg by mouth Daily., Disp: , Rfl:   •  Trulicity 0.75 MG/0.5ML solution pen-injector, , Disp: , Rfl:   •  venlafaxine XR (EFFEXOR-XR) 75 MG 24 hr capsule, venlafaxine ER 75 mg capsule,extended release 24 hr  Take 1 capsule every day by oral route., Disp: , Rfl:   •  VITAMIN D, CHOLECALCIFEROL, PO, Take 2,000 Units by mouth Daily., Disp: , Rfl:   •  tiZANidine (ZANAFLEX) 2 MG tablet, , Disp: , Rfl:     Physical Exam:   General Appearance:    Alert, cooperative, in no acute distress   Head:    Normocephalic, without obvious abnormality, atraumatic  Neck-no palpable thyroid nodule bilaterally.   Lungs:     Clear to auscultation,respirations regular, even and                  unlabored    Heart:    Regular rhythm and normal rate, " normal S1 and S2, no            murmur, no gallop, no rub, no click   Abdomen:     Normal bowel sounds, no masses, no organomegaly, soft        non-tender, non-distended, no guarding, no rebound                tenderness   Extremities:   Moves all extremities well, no edema, no cyanosis, no             redness   Pulses:   Pulses palpable and equal bilaterally   Skin:   No bleeding, bruising or rash     Results Review:   I reviewed the patient's new clinical results.     Review of Systems was reviewed and confirmed as accurate as documented by the MA.    ASSESSMENT/PLAN:    1. Thyroid nodule       Status post left thyroid lobectomy, doing well.  Right lobe of the very small cyst otherwise negative.  Follow-up in 1 year.    Electronically signed by Pao John MD  08/22/22

## 2022-08-22 ENCOUNTER — OFFICE VISIT (OUTPATIENT)
Dept: SURGERY | Facility: CLINIC | Age: 72
End: 2022-08-22

## 2022-08-22 VITALS
RESPIRATION RATE: 18 BRPM | DIASTOLIC BLOOD PRESSURE: 70 MMHG | HEIGHT: 65 IN | SYSTOLIC BLOOD PRESSURE: 130 MMHG | TEMPERATURE: 96.3 F | OXYGEN SATURATION: 97 % | BODY MASS INDEX: 28.12 KG/M2 | WEIGHT: 168.8 LBS | HEART RATE: 81 BPM

## 2022-08-22 DIAGNOSIS — E04.1 THYROID NODULE: Primary | ICD-10-CM

## 2022-08-22 PROCEDURE — 99212 OFFICE O/P EST SF 10 MIN: CPT | Performed by: SURGERY

## 2022-10-11 ENCOUNTER — HOSPITAL ENCOUNTER (OUTPATIENT)
Dept: MAMMOGRAPHY | Facility: HOSPITAL | Age: 72
Discharge: HOME | End: 2022-10-11
Admitting: INTERNAL MEDICINE

## 2022-10-11 DIAGNOSIS — Z12.31 VISIT FOR SCREENING MAMMOGRAM: ICD-10-CM

## 2022-10-11 PROCEDURE — 77067 SCR MAMMO BI INCL CAD: CPT

## 2022-10-11 PROCEDURE — 77063 BREAST TOMOSYNTHESIS BI: CPT

## 2022-12-12 ENCOUNTER — OFFICE VISIT (OUTPATIENT)
Dept: CARDIOLOGY | Facility: CLINIC | Age: 72
End: 2022-12-12

## 2022-12-12 VITALS
OXYGEN SATURATION: 96 % | HEIGHT: 65 IN | WEIGHT: 169 LBS | RESPIRATION RATE: 15 BRPM | BODY MASS INDEX: 28.16 KG/M2 | HEART RATE: 70 BPM

## 2022-12-12 DIAGNOSIS — E78.5 DYSLIPIDEMIA: Primary | ICD-10-CM

## 2022-12-12 DIAGNOSIS — E11.9 TYPE 2 DIABETES MELLITUS WITHOUT COMPLICATION, WITHOUT LONG-TERM CURRENT USE OF INSULIN: ICD-10-CM

## 2022-12-12 DIAGNOSIS — I10 ESSENTIAL HYPERTENSION: ICD-10-CM

## 2022-12-12 PROCEDURE — 99213 OFFICE O/P EST LOW 20 MIN: CPT | Performed by: INTERNAL MEDICINE

## 2022-12-12 NOTE — PROGRESS NOTES
Subjective:     Encounter Date:12/12/2022      Patient ID: Latonya Mei is a 72 y.o. female.    Chief Complaint: Hypertension  HPI  This is a 72-year-old female patient who presents to cardiology clinic for routine follow-up.  Since her last visit she has had no active cardiovascular issues, symptoms or hospitalizations.  She reports compliance with her medications with no perceived side effects.  She remains a lifelong non-smoker.  The following portions of the patient's history were reviewed and updated as appropriate: allergies, current medications, past family history, past medical history, past social history, past surgical history and problem  Review of Systems   Constitutional: Negative for chills, diaphoresis, fever, malaise/fatigue, weight gain and weight loss.   HENT: Negative for ear discharge, hearing loss, hoarse voice and nosebleeds.    Eyes: Negative for discharge, double vision, pain and photophobia.   Cardiovascular: Negative for chest pain, claudication, cyanosis, dyspnea on exertion, irregular heartbeat, leg swelling, near-syncope, orthopnea, palpitations, paroxysmal nocturnal dyspnea and syncope.   Respiratory: Negative for cough, hemoptysis, shortness of breath, sputum production and wheezing.    Endocrine: Negative for cold intolerance, heat intolerance, polydipsia, polyphagia and polyuria.   Hematologic/Lymphatic: Negative for adenopathy and bleeding problem. Does not bruise/bleed easily.   Skin: Negative for color change, flushing, itching and rash.   Musculoskeletal: Negative for muscle cramps, muscle weakness, myalgias and stiffness.   Gastrointestinal: Negative for abdominal pain, diarrhea, hematemesis, hematochezia, nausea and vomiting.   Genitourinary: Negative for dysuria, frequency and nocturia.   Neurological: Negative for focal weakness, loss of balance, numbness, paresthesias and seizures.   Psychiatric/Behavioral: Negative for altered mental status, hallucinations and  suicidal ideas.   Allergic/Immunologic: Negative for HIV exposure, hives and persistent infections.           Current Outpatient Medications:   •  ACCU-CHEK FASTCLIX LANCETS misc, TEST 1-2 TIMES DAILY, Disp: 50 each, Rfl: 12  •  aspirin 81 MG chewable tablet, Chew 81 mg Daily., Disp: , Rfl:   •  ezetimibe (ZETIA) 10 MG tablet, Daily., Disp: , Rfl:   •  glucose blood (ACCU-CHEK GUIDE) test strip, Test 1-2 Times daily, Disp: 50 each, Rfl: 12  •  levothyroxine (SYNTHROID, LEVOTHROID) 25 MCG tablet, Take 1 tablet by mouth Daily., Disp: 90 tablet, Rfl: 2  •  metFORMIN ER (GLUCOPHAGE-XR) 500 MG 24 hr tablet, Take 1 tablet by mouth Daily With Breakfast. (Patient taking differently: Take 1,000 mg by mouth.), Disp: 90 tablet, Rfl: 2  •  metoprolol succinate XL (TOPROL-XL) 50 MG 24 hr tablet, TAKE ONE TABLET BY MOUTH DAILY, Disp: 90 tablet, Rfl: 3  •  omeprazole (priLOSEC) 40 MG capsule, TAKE ONE CAPSULE BY MOUTH 30 MINUTES BEFORE BREAKFAST DAILY. Needs office visit for further refills., Disp: 30 capsule, Rfl: 0  •  rosuvastatin (CRESTOR) 40 MG tablet, Take 40 mg by mouth Daily., Disp: , Rfl:   •  tiZANidine (ZANAFLEX) 2 MG tablet, , Disp: , Rfl:   •  Trulicity 0.75 MG/0.5ML solution pen-injector, , Disp: , Rfl:   •  venlafaxine XR (EFFEXOR-XR) 75 MG 24 hr capsule, venlafaxine ER 75 mg capsule,extended release 24 hr  Take 1 capsule every day by oral route., Disp: , Rfl:   •  VITAMIN D, CHOLECALCIFEROL, PO, Take 2,000 Units by mouth Daily., Disp: , Rfl:     Objective:   Vitals and nursing note reviewed.   Constitutional:       Appearance: Healthy appearance. Not in distress.   Neck:      Vascular: No JVR. JVD normal.   Pulmonary:      Effort: Pulmonary effort is normal.      Breath sounds: Normal breath sounds. No wheezing. No rhonchi. No rales.   Chest:      Chest wall: Not tender to palpatation.   Cardiovascular:      PMI at left midclavicular line. Normal rate. Regular rhythm. Normal S1. Normal S2.      Murmurs: There is no  "murmur.      No gallop. No click. No rub.   Pulses:     Intact distal pulses.   Edema:     Peripheral edema absent.   Abdominal:      General: Bowel sounds are normal.      Palpations: Abdomen is soft.      Tenderness: There is no abdominal tenderness.   Musculoskeletal: Normal range of motion.         General: No tenderness. Skin:     General: Skin is warm and dry.   Neurological:      General: No focal deficit present.      Mental Status: Alert and oriented to person, place and time.       BP: 110/70, Pulse 70, resp. rate 15, height 165.1 cm (65\"), weight 76.7 kg (169 lb), SpO2 96 %, not currently breastfeeding.   Lab Review:     Assessment:       1. Dyslipidemia  Tolerating high intensity statin based cholesterol-lowering therapy without side effects.    2. Essential hypertension  Acceptable blood pressure control.    3. Type 2 diabetes mellitus without complication, without long-term current use of insulin (HCC)  Typical obesity related insulin resistance.    Procedures    Plan:     Advance Care Planning   ACP discussion was held with the patient during this visit. Patient has an advance directive (not in EMR), copy requested.     No changes in medications have been made at today's visit.    The importance of diet exercise and weight management has been reinforced with the patient    No cardiovascular testing is indicated at this time.      "

## 2023-03-13 ENCOUNTER — OFFICE VISIT (OUTPATIENT)
Dept: GASTROENTEROLOGY | Facility: CLINIC | Age: 73
End: 2023-03-13
Payer: MEDICARE

## 2023-03-13 ENCOUNTER — LAB (OUTPATIENT)
Dept: LAB | Facility: HOSPITAL | Age: 73
End: 2023-03-13
Payer: MEDICARE

## 2023-03-13 VITALS
WEIGHT: 172 LBS | SYSTOLIC BLOOD PRESSURE: 122 MMHG | DIASTOLIC BLOOD PRESSURE: 82 MMHG | BODY MASS INDEX: 28.62 KG/M2 | HEART RATE: 78 BPM | OXYGEN SATURATION: 98 %

## 2023-03-13 DIAGNOSIS — K59.00 CONSTIPATION, UNSPECIFIED CONSTIPATION TYPE: ICD-10-CM

## 2023-03-13 DIAGNOSIS — R10.13 EPIGASTRIC BURNING SENSATION: Chronic | ICD-10-CM

## 2023-03-13 DIAGNOSIS — R11.0 NAUSEA: Chronic | ICD-10-CM

## 2023-03-13 DIAGNOSIS — R11.0 NAUSEA: ICD-10-CM

## 2023-03-13 DIAGNOSIS — Z12.11 ENCOUNTER FOR SCREENING FOR MALIGNANT NEOPLASM OF COLON: ICD-10-CM

## 2023-03-13 DIAGNOSIS — K59.00 CONSTIPATION, UNSPECIFIED CONSTIPATION TYPE: Chronic | ICD-10-CM

## 2023-03-13 DIAGNOSIS — R10.13 EPIGASTRIC BURNING SENSATION: ICD-10-CM

## 2023-03-13 DIAGNOSIS — K21.9 GASTROESOPHAGEAL REFLUX DISEASE, UNSPECIFIED WHETHER ESOPHAGITIS PRESENT: Primary | Chronic | ICD-10-CM

## 2023-03-13 PROCEDURE — 3074F SYST BP LT 130 MM HG: CPT | Performed by: NURSE PRACTITIONER

## 2023-03-13 PROCEDURE — 86364 TISS TRNSGLTMNASE EA IG CLAS: CPT

## 2023-03-13 PROCEDURE — 1160F RVW MEDS BY RX/DR IN RCRD: CPT | Performed by: NURSE PRACTITIONER

## 2023-03-13 PROCEDURE — 82784 ASSAY IGA/IGD/IGG/IGM EACH: CPT

## 2023-03-13 PROCEDURE — 36415 COLL VENOUS BLD VENIPUNCTURE: CPT

## 2023-03-13 PROCEDURE — 80053 COMPREHEN METABOLIC PANEL: CPT

## 2023-03-13 PROCEDURE — 83690 ASSAY OF LIPASE: CPT

## 2023-03-13 PROCEDURE — 3079F DIAST BP 80-89 MM HG: CPT | Performed by: NURSE PRACTITIONER

## 2023-03-13 PROCEDURE — 84443 ASSAY THYROID STIM HORMONE: CPT

## 2023-03-13 PROCEDURE — 85027 COMPLETE CBC AUTOMATED: CPT

## 2023-03-13 PROCEDURE — 99214 OFFICE O/P EST MOD 30 MIN: CPT | Performed by: NURSE PRACTITIONER

## 2023-03-13 PROCEDURE — 1159F MED LIST DOCD IN RCRD: CPT | Performed by: NURSE PRACTITIONER

## 2023-03-13 RX ORDER — ACETAMINOPHEN 500 MG
500 TABLET ORAL AS NEEDED
COMMUNITY

## 2023-03-13 RX ORDER — BISACODYL 5 MG/1
5 TABLET, DELAYED RELEASE ORAL DAILY PRN
COMMUNITY

## 2023-03-13 NOTE — PATIENT INSTRUCTIONS
Antireflux measures: Avoid fried, fatty foods, alcohol, chocolate, coffee, tea,  soft drinks, peppermint and spearmint, spicy foods, tomatoes and tomato based foods, onion based foods, and smoking.  Other antireflux measures include weight reduction if overweight, avoiding tight clothing around the abdomen, elevating the head of the bed 6 inches with blocks under the head board, and don't drink or eat before going to bed and avoid lying down immediately after meals.  Omeprazole 40 mg 1 po daily in the am 30 minutes before breakfast.  Recommended to take Levothyroxine first in the am upon waking, wait 30 minutes, then take Omeprazole 40 mg, wait 30 minutes, then eat breakfast and take other medications.   The patient should eat 4-5 very small meals throughout the day. Avoid large meals.  It is recommended to eat a softer diet. Meats are best consumed ground. Fruits and vegetables are best consumed cooked or steamed and then mashed.    Low fiber, low fat diet with liberal water intake. May use soluble fiber.   Avoid all dairy. May use lactose free/dairy free alternatives.  Metamucil 1 packet daily.   Miralax 17 grams daily as needed for constipation.   May add stool softeners 2 per day as needed for constipation.   Colonoscopy for screening in October 2024.   Labs  CT Abdomen and pelvis  Follow up: 3 months or sooner if needed
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
## 2023-03-14 LAB
ALBUMIN SERPL-MCNC: 4.3 G/DL (ref 3.5–5.2)
ALBUMIN/GLOB SERPL: 1.7 G/DL
ALP SERPL-CCNC: 48 U/L (ref 39–117)
ALT SERPL W P-5'-P-CCNC: 11 U/L (ref 1–33)
ANION GAP SERPL CALCULATED.3IONS-SCNC: 9 MMOL/L (ref 5–15)
AST SERPL-CCNC: 25 U/L (ref 1–32)
BILIRUB SERPL-MCNC: 0.5 MG/DL (ref 0–1.2)
BUN SERPL-MCNC: 15 MG/DL (ref 8–23)
BUN/CREAT SERPL: 19.7 (ref 7–25)
CALCIUM SPEC-SCNC: 9.8 MG/DL (ref 8.6–10.5)
CHLORIDE SERPL-SCNC: 102 MMOL/L (ref 98–107)
CO2 SERPL-SCNC: 28 MMOL/L (ref 22–29)
CREAT SERPL-MCNC: 0.76 MG/DL (ref 0.57–1)
DEPRECATED RDW RBC AUTO: 41.8 FL (ref 37–54)
EGFRCR SERPLBLD CKD-EPI 2021: 82.3 ML/MIN/1.73
ERYTHROCYTE [DISTWIDTH] IN BLOOD BY AUTOMATED COUNT: 12.3 % (ref 12.3–15.4)
GLOBULIN UR ELPH-MCNC: 2.5 GM/DL
GLUCOSE SERPL-MCNC: 104 MG/DL (ref 65–99)
HCT VFR BLD AUTO: 36.8 % (ref 34–46.6)
HGB BLD-MCNC: 12.1 G/DL (ref 12–15.9)
IGA1 MFR SER: 155 MG/DL (ref 70–400)
LIPASE SERPL-CCNC: 62 U/L (ref 13–60)
MCH RBC QN AUTO: 30.5 PG (ref 26.6–33)
MCHC RBC AUTO-ENTMCNC: 32.9 G/DL (ref 31.5–35.7)
MCV RBC AUTO: 92.7 FL (ref 79–97)
PLATELET # BLD AUTO: 235 10*3/MM3 (ref 140–450)
PMV BLD AUTO: 10.2 FL (ref 6–12)
POTASSIUM SERPL-SCNC: 4.9 MMOL/L (ref 3.5–5.2)
PROT SERPL-MCNC: 6.8 G/DL (ref 6–8.5)
RBC # BLD AUTO: 3.97 10*6/MM3 (ref 3.77–5.28)
SODIUM SERPL-SCNC: 139 MMOL/L (ref 136–145)
TSH SERPL DL<=0.05 MIU/L-ACNC: 3.77 UIU/ML (ref 0.27–4.2)
WBC NRBC COR # BLD: 5.2 10*3/MM3 (ref 3.4–10.8)

## 2023-03-15 LAB — TTG IGA SER-ACNC: <2 U/ML (ref 0–3)

## 2023-03-17 ENCOUNTER — PATIENT ROUNDING (BHMG ONLY) (OUTPATIENT)
Dept: GASTROENTEROLOGY | Facility: CLINIC | Age: 73
End: 2023-03-17
Payer: MEDICARE

## 2023-03-17 NOTE — PROGRESS NOTES
March 17, 2023    Hello, may I speak with Latonya Mei?    My name is Augusta    I am  with [facility]  [address]  [place] [facility] [address] [phone].    Before we get started may I verify your date of birth? 1950    I am calling to officially welcome you to our practice and ask about your recent visit. Is this a good time to talk?     Tell me about your visit with us. What things went well?         We're always looking for ways to make our patients' experiences even better. Do you have recommendations on ways we may improve?      Overall were you satisfied with your first visit to our practice?        I appreciate you taking the time to speak with me today. Is there anything else I can do for you?       Thank you, and have a great day.

## 2023-03-31 ENCOUNTER — HOSPITAL ENCOUNTER (OUTPATIENT)
Dept: CT IMAGING | Facility: HOSPITAL | Age: 73
Discharge: HOME | End: 2023-03-31
Admitting: NURSE PRACTITIONER
Payer: MEDICARE

## 2023-03-31 DIAGNOSIS — R11.0 NAUSEA: Chronic | ICD-10-CM

## 2023-03-31 DIAGNOSIS — K59.00 CONSTIPATION, UNSPECIFIED CONSTIPATION TYPE: Chronic | ICD-10-CM

## 2023-03-31 DIAGNOSIS — R10.13 EPIGASTRIC BURNING SENSATION: Chronic | ICD-10-CM

## 2023-03-31 PROCEDURE — 25510000001 IOPAMIDOL 61 % SOLUTION: Performed by: NURSE PRACTITIONER

## 2023-03-31 PROCEDURE — 74177 CT ABD & PELVIS W/CONTRAST: CPT

## 2023-03-31 RX ADMIN — IOPAMIDOL 100 ML: 612 INJECTION, SOLUTION INTRAVENOUS at 13:25

## 2023-04-03 ENCOUNTER — PREP FOR SURGERY (OUTPATIENT)
Dept: OTHER | Facility: HOSPITAL | Age: 73
End: 2023-04-03
Payer: MEDICARE

## 2023-04-03 DIAGNOSIS — R11.0 NAUSEA: Primary | ICD-10-CM

## 2023-04-03 DIAGNOSIS — R93.3 ABNORMAL CT SCAN, STOMACH: ICD-10-CM

## 2023-04-03 RX ORDER — SODIUM CHLORIDE 9 MG/ML
70 INJECTION, SOLUTION INTRAVENOUS CONTINUOUS PRN
OUTPATIENT
Start: 2023-04-03

## 2023-04-04 ENCOUNTER — TELEPHONE (OUTPATIENT)
Dept: GASTROENTEROLOGY | Facility: CLINIC | Age: 73
End: 2023-04-04
Payer: MEDICARE

## 2023-04-04 NOTE — TELEPHONE ENCOUNTER
----- Message from SHELLY Patterson sent at 4/3/2023  9:33 AM EDT -----  Can you call her and let her know her CT scan showed some possible gastritis. I have ordered an EGD to check this out. She needs to be scheduled and cancel the follow up on 6/14/2023, it will be rescheduled after her EGD.

## 2023-04-06 ENCOUNTER — TELEPHONE (OUTPATIENT)
Dept: GASTROENTEROLOGY | Facility: CLINIC | Age: 73
End: 2023-04-06
Payer: MEDICARE

## 2023-04-14 PROBLEM — R93.3 ABNORMAL CT SCAN, STOMACH: Status: ACTIVE | Noted: 2023-04-14

## 2023-04-21 ENCOUNTER — TELEPHONE (OUTPATIENT)
Dept: GASTROENTEROLOGY | Facility: CLINIC | Age: 73
End: 2023-04-21
Payer: MEDICARE

## 2023-04-26 RX ORDER — LANOLIN ALCOHOL/MO/W.PET/CERES
1000 CREAM (GRAM) TOPICAL DAILY
COMMUNITY

## 2023-04-26 NOTE — PRE-PROCEDURE INSTRUCTIONS
PAT phone history completed with pt for upcoming procedure on 4/28/23, with Dr. Andre.     PAT PASS GIVEN/REVIEWED WITH PT.  VERBALIZED UNDERSTANDING OF THE FOLLOWING:  DO NOT EAT, DRINK, SMOKE, USE SMOKELESS TOBACCO OR CHEW GUM AFTER MIDNIGHT THE NIGHT BEFORE SURGERY.  THIS ALSO INCLUDES HARD CANDIES AND MINTS.    DO NOT SHAVE THE AREA TO BE OPERATED ON AT LEAST 48 HOURS PRIOR TO THE PROCEDURE.  DO NOT WEAR MAKE UP OR NAIL POLISH.  DO NOT LEAVE IN ANY PIERCING OR WEAR JEWELRY THE DAY OF SURGERY.      DO NOT USE ADHESIVES IF YOU WEAR DENTURES.    DO NOT WEAR EYE CONTACTS; BRING IN YOUR GLASSES.    ONLY TAKE MEDICATION THE MORNING OF YOUR PROCEDURE IF INSTRUCTED BY YOUR SURGEON WITH ENOUGH WATER TO SWALLOW THE MEDICATION.  IF YOUR SURGEON DID NOT SPECIFY WHICH MEDICATIONS TO TAKE, YOU WILL NEED TO CALL THEIR OFFICE FOR FURTHER INSTRUCTIONS AND DO AS THEY INSTRUCT.    LEAVE ANYTHING YOU CONSIDER VALUABLE AT HOME.    YOU WILL NEED TO ARRANGE FOR SOMEONE TO DRIVE YOU HOME AFTER SURGERY.  IT IS RECOMMENDED THAT YOU DO NOT DRIVE, WORK, DRINK ALCOHOL OR MAKE MAJOR DECISIONS FOR AT LEAST 24 HOURS AFTER YOUR PROCEDURE IS COMPLETE.      THE DAY OF YOUR PROCEDURE, BRING IN THE FOLLOWING IF APPLICABLE:   PICTURE ID AND INSURANCE/MEDICARE OR MEDICAID CARDS/ANY CO-PAY THAT MAY BE DUE   COPY OF ADVANCED DIRECTIVE/LIVING WILL/POWER OR    CPAP/BIPAP/INHALERS   SKIN PREP SHEET   YOUR PREADMISSION TESTING PASS (IF NOT A PHONE HISTORY)

## 2023-04-28 ENCOUNTER — ANESTHESIA (OUTPATIENT)
Dept: GASTROENTEROLOGY | Facility: HOSPITAL | Age: 73
End: 2023-04-28
Payer: MEDICARE

## 2023-04-28 ENCOUNTER — HOSPITAL ENCOUNTER (OUTPATIENT)
Facility: HOSPITAL | Age: 73
Setting detail: HOSPITAL OUTPATIENT SURGERY
Discharge: HOME | End: 2023-04-28
Attending: INTERNAL MEDICINE | Admitting: INTERNAL MEDICINE
Payer: MEDICARE

## 2023-04-28 ENCOUNTER — ANESTHESIA EVENT (OUTPATIENT)
Dept: GASTROENTEROLOGY | Facility: HOSPITAL | Age: 73
End: 2023-04-28
Payer: MEDICARE

## 2023-04-28 VITALS
TEMPERATURE: 97.4 F | DIASTOLIC BLOOD PRESSURE: 70 MMHG | WEIGHT: 166 LBS | RESPIRATION RATE: 16 BRPM | HEIGHT: 66 IN | HEART RATE: 80 BPM | OXYGEN SATURATION: 98 % | BODY MASS INDEX: 26.68 KG/M2 | SYSTOLIC BLOOD PRESSURE: 130 MMHG

## 2023-04-28 DIAGNOSIS — R11.0 NAUSEA: ICD-10-CM

## 2023-04-28 DIAGNOSIS — R93.3 ABNORMAL CT SCAN, STOMACH: ICD-10-CM

## 2023-04-28 DIAGNOSIS — R19.4 CHANGE IN BOWEL HABITS: ICD-10-CM

## 2023-04-28 LAB — GLUCOSE BLDC GLUCOMTR-MCNC: 164 MG/DL (ref 70–130)

## 2023-04-28 PROCEDURE — 43239 EGD BIOPSY SINGLE/MULTIPLE: CPT | Performed by: INTERNAL MEDICINE

## 2023-04-28 PROCEDURE — 25010000002 PROPOFOL 10 MG/ML EMULSION: Performed by: NURSE ANESTHETIST, CERTIFIED REGISTERED

## 2023-04-28 PROCEDURE — 88305 TISSUE EXAM BY PATHOLOGIST: CPT

## 2023-04-28 PROCEDURE — 82948 REAGENT STRIP/BLOOD GLUCOSE: CPT

## 2023-04-28 RX ORDER — PROMETHAZINE HYDROCHLORIDE 12.5 MG/1
12.5 TABLET ORAL EVERY 8 HOURS PRN
Qty: 30 TABLET | Refills: 1 | Status: SHIPPED | OUTPATIENT
Start: 2023-04-28

## 2023-04-28 RX ORDER — SODIUM CHLORIDE 9 MG/ML
70 INJECTION, SOLUTION INTRAVENOUS CONTINUOUS PRN
Status: DISCONTINUED | OUTPATIENT
Start: 2023-04-28 | End: 2023-04-28 | Stop reason: HOSPADM

## 2023-04-28 RX ORDER — PROPOFOL 10 MG/ML
VIAL (ML) INTRAVENOUS AS NEEDED
Status: DISCONTINUED | OUTPATIENT
Start: 2023-04-28 | End: 2023-04-28 | Stop reason: SURG

## 2023-04-28 RX ORDER — LIDOCAINE HYDROCHLORIDE 20 MG/ML
INJECTION, SOLUTION INTRAVENOUS AS NEEDED
Status: DISCONTINUED | OUTPATIENT
Start: 2023-04-28 | End: 2023-04-28 | Stop reason: SURG

## 2023-04-28 RX ADMIN — PROPOFOL 100 MG: 10 INJECTION, EMULSION INTRAVENOUS at 10:27

## 2023-04-28 RX ADMIN — SODIUM CHLORIDE 70 ML/HR: 9 INJECTION, SOLUTION INTRAVENOUS at 09:32

## 2023-04-28 RX ADMIN — PROPOFOL 50 MG: 10 INJECTION, EMULSION INTRAVENOUS at 10:32

## 2023-04-28 RX ADMIN — LIDOCAINE HYDROCHLORIDE 40 MG: 20 INJECTION, SOLUTION INTRAVENOUS at 10:27
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
## 2023-04-28 NOTE — ANESTHESIA POSTPROCEDURE EVALUATION
Patient: Latonya Mei    Procedure Summary     Date: 04/28/23 Room / Location: [facility] ENDOSCOPY 1 / [facility] ENDOSCOPY    Anesthesia Start: 1019 Anesthesia Stop: 1034    Procedure: ESOPHAGOGASTRODUODENOSCOPY with biopsy (Esophagus) Diagnosis:       Nausea      Abnormal CT scan, stomach      (Nausea [R11.0])      (Abnormal CT scan, stomach [R93.3])    Surgeons: Kena Andre MD Provider: Mark Anthony Fall CRNA    Anesthesia Type: MAC ASA Status: 3          Anesthesia Type: MAC    Vitals  HR 68  Sat 97  Resp 15  /64  Temp 98        Post Anesthesia Care and Evaluation    Patient location during evaluation: bedside  Patient participation: complete - patient participated  Level of consciousness: awake and alert and sleepy but conscious  Pain score: 0  Pain management: adequate    Airway patency: patent  Anesthetic complications: No anesthetic complications  PONV Status: none  Cardiovascular status: acceptable  Respiratory status: acceptable  Hydration status: acceptable

## 2023-04-28 NOTE — ANESTHESIA PREPROCEDURE EVALUATION
" Anesthesia Evaluation     Patient summary reviewed and Nursing notes reviewed   no history of anesthetic complications:  NPO Solid Status: > 8 hours  NPO Liquid Status: > 6 hours           Airway   Mallampati: II  TM distance: >3 FB  Neck ROM: full  no difficulty expected  Dental    (+) upper dentures and lower dentures    Pulmonary - normal exam    breath sounds clear to auscultation  (+) shortness of breath, sleep apnea,   (-) not a smoker  Cardiovascular - normal exam  Exercise tolerance: good (4-7 METS)    ECG reviewed  PT is on anticoagulation therapy  Patient on routine beta blocker  Rhythm: regular  Rate: normal    (+) hypertension, valvular problems/murmurs (\"leaky valve\") AI, IBARRA, hyperlipidemia,     ROS comment: EKG NSR 72, Normal    Neuro/Psych  (+) headaches, dizziness/light headedness, psychiatric history Depression and Anxiety,    GI/Hepatic/Renal/Endo    (+)  GERD well controlled, PUD (Hx ulcers),  diabetes mellitus (JQMU411) type 2, thyroid problem hypothyroidism    Musculoskeletal     (+) arthralgias (Fibromyalgia), back pain, chronic pain, myalgias (Fibromyalgia ),   Abdominal    Substance History - negative use     OB/GYN negative ob/gyn ROS         Other   arthritis,                        Anesthesia Plan    ASA 3     MAC     (Risks and benefits of general anesthesia discussed including risk of aspiration, recall and dental damage. All patient questions answered.    Will continue with plan of care.)  intravenous induction     Anesthetic plan, risks, benefits, and alternatives have been provided, discussed and informed consent has been obtained with: patient.  Pre-procedure education provided  Plan discussed with CRNA.      "

## 2023-04-28 NOTE — DISCHARGE INSTRUCTIONS
- Discharge patient to home (ambulatory).   - Resume previous diet.   - Antireflux measures  - Resume ASA after 48 hours  - Low dose longterm ppi  - Antiemetic prn   - Metamucil powder 1scoop or fiber cap 2-4 po daily  - Await pathology results.   - Return to my office in 8 weeks.     Please follow all post op instructions and follow up appointment time from your physician's office included in your discharge packet.  .  No pushing, pulling, tugging,  heavy lifting, or strenuous activity.  No major decision making, driving, or drinking alcoholic beverages for 24 hours. ( due to the medications you have  received)  Always use good hand hygiene/washing techniques.  NO driving while taking pain medications.    * if you have an incision:  Check your incision area every day for signs of infection.   Check for:  * more redness, swelling, or pain  *more fluid or blood  *warmth  *pus or bad smell     To assist you in voiding:  Drink plenty of fluids  Listen to running water while attempting to void.    If you are unable to urinate and you have an uncomfortable urge to void or it has been   6 hours since you were discharged, return to the Emergency Room

## 2023-05-01 LAB — REF LAB TEST METHOD: NORMAL

## 2023-05-21 DIAGNOSIS — R94.31 ABNORMAL ECG: ICD-10-CM

## 2023-05-21 DIAGNOSIS — R00.2 PALPITATIONS: ICD-10-CM

## 2023-05-21 DIAGNOSIS — I35.1 NONRHEUMATIC AORTIC VALVE INSUFFICIENCY: ICD-10-CM

## 2023-05-21 DIAGNOSIS — E78.00 PURE HYPERCHOLESTEROLEMIA: ICD-10-CM

## 2023-05-21 DIAGNOSIS — E11.9 TYPE 2 DIABETES MELLITUS WITHOUT COMPLICATION, WITHOUT LONG-TERM CURRENT USE OF INSULIN: ICD-10-CM

## 2023-05-22 RX ORDER — METOPROLOL SUCCINATE 50 MG/1
TABLET, EXTENDED RELEASE ORAL
Qty: 90 TABLET | Refills: 3 | Status: SHIPPED | OUTPATIENT
Start: 2023-05-22

## 2023-06-14 ENCOUNTER — OFFICE VISIT (OUTPATIENT)
Dept: GASTROENTEROLOGY | Facility: CLINIC | Age: 73
End: 2023-06-14
Payer: MEDICARE

## 2023-06-14 VITALS
HEIGHT: 66 IN | WEIGHT: 165 LBS | OXYGEN SATURATION: 97 % | HEART RATE: 75 BPM | SYSTOLIC BLOOD PRESSURE: 110 MMHG | TEMPERATURE: 97.5 F | DIASTOLIC BLOOD PRESSURE: 72 MMHG | BODY MASS INDEX: 26.52 KG/M2

## 2023-06-14 DIAGNOSIS — E66.3 OVERWEIGHT WITH BODY MASS INDEX (BMI) OF 26 TO 26.9 IN ADULT: Chronic | ICD-10-CM

## 2023-06-14 DIAGNOSIS — Z12.11 ENCOUNTER FOR SCREENING FOR MALIGNANT NEOPLASM OF COLON: ICD-10-CM

## 2023-06-14 DIAGNOSIS — K21.00 GASTROESOPHAGEAL REFLUX DISEASE WITH ESOPHAGITIS WITHOUT HEMORRHAGE: Primary | Chronic | ICD-10-CM

## 2023-06-14 DIAGNOSIS — K59.00 CONSTIPATION, UNSPECIFIED CONSTIPATION TYPE: Chronic | ICD-10-CM

## 2023-06-14 DIAGNOSIS — K76.0 FATTY (CHANGE OF) LIVER, NOT ELSEWHERE CLASSIFIED: Chronic | ICD-10-CM

## 2023-06-14 DIAGNOSIS — R11.0 NAUSEA: Chronic | ICD-10-CM

## 2023-06-14 DIAGNOSIS — R10.13 EPIGASTRIC BURNING SENSATION: Chronic | ICD-10-CM

## 2023-06-14 PROCEDURE — 3078F DIAST BP <80 MM HG: CPT | Performed by: NURSE PRACTITIONER

## 2023-06-14 PROCEDURE — 1160F RVW MEDS BY RX/DR IN RCRD: CPT | Performed by: NURSE PRACTITIONER

## 2023-06-14 PROCEDURE — 3074F SYST BP LT 130 MM HG: CPT | Performed by: NURSE PRACTITIONER

## 2023-06-14 PROCEDURE — 1159F MED LIST DOCD IN RCRD: CPT | Performed by: NURSE PRACTITIONER

## 2023-06-14 PROCEDURE — 99214 OFFICE O/P EST MOD 30 MIN: CPT | Performed by: NURSE PRACTITIONER

## 2023-06-14 NOTE — PATIENT INSTRUCTIONS
Antireflux measures: Avoid fried, fatty foods, alcohol, chocolate, coffee, tea,  soft drinks, peppermint and spearmint, spicy foods, tomatoes and tomato based foods, onion based foods, and smoking.  Other antireflux measures include weight reduction if overweight, avoiding tight clothing around the abdomen, elevating the head of the bed 6 inches with blocks under the head board, and don't drink or eat before going to bed and avoid lying down immediately after meals.  Omeprazole 40 mg 1 po daily in the am 30 minutes before breakfast.  Recommended to take Levothyroxine first in the am upon waking, wait 30 minutes, then take Omeprazole 40 mg, wait 30 minutes, then eat breakfast and take other medications.   The patient should eat 4-5 very small meals throughout the day. Avoid large meals.  It is recommended to eat a softer diet. Meats are best consumed ground. Fruits and vegetables are best consumed cooked or steamed and then mashed.    Low fiber, low fat diet with liberal water intake. May use soluble fiber.   Low FODMAP diet - Avoid all dairy. May use lactose free/dairy free alternatives.  Metamucil 1 packet daily.   Miralax 17 grams daily as needed for constipation.   May add stool softeners 2 per day as needed for constipation.   Colonoscopy for screening in October 2024.    Advised to exercise/increase activity 4-5 days per week.   Advised to lose 5-10 pounds in the next 6-12 months.  Follow up: 6 months    Low-FODMAP Eating Plan  FODMAP stands for fermentable oligosaccharides, disaccharides, monosaccharides, and polyols. These are sugars that are hard for some people to digest. A low-FODMAP eating plan may help some people who have irritable bowel syndrome (IBS) and certain other bowel (intestinal) diseases to manage their symptoms.  This meal plan can be complicated to follow. Work with a diet and nutrition specialist (dietitian) to make a low-FODMAP eating plan that is right for you. A dietitian can help make  sure that you get enough nutrition from this diet.  What are tips for following this plan?  Reading food labels  Check labels for hidden FODMAPs such as:  High-fructose syrup.  Honey.  Agave.  Natural fruit flavors.  Onion or garlic powder.  Choose low-FODMAP foods that contain 3-4 grams of fiber per serving.  Check food labels for serving sizes. Eat only one serving at a time to make sure FODMAP levels stay low.  Shopping  Shop with a list of foods that are recommended on this diet and make a meal plan.  Meal planning  Follow a low-FODMAP eating plan for up to 6 weeks, or as told by your health care provider or dietitian.  To follow the eating plan:  Eliminate high-FODMAP foods from your diet completely. Choose only low-FODMAP foods to eat. You will do this for 2-6 weeks.  Gradually reintroduce high-FODMAP foods into your diet one at a time. Most people should wait a few days before introducing the next new high-FODMAP food into their meal plan. Your dietitian can recommend how quickly you may reintroduce foods.  Keep a daily record of what and how much you eat and drink. Make note of any symptoms that you have after eating.  Review your daily record with a dietitian regularly to identify which foods you can eat and which foods you should avoid.  General tips  Drink enough fluid each day to keep your urine pale yellow.  Avoid processed foods. These often have added sugar and may be high in FODMAPs.  Avoid most dairy products, whole grains, and sweeteners.  Work with a dietitian to make sure you get enough fiber in your diet.  Avoid high FODMAP foods at meals to manage symptoms.    Recommended foods  Fruits  Bananas, oranges, tangerines, quynh, limes, blueberries, raspberries, strawberries, grapes, cantaloupe, honeydew melon, kiwi, papaya, passion fruit, and pineapple. Limited amounts of dried cranberries, banana chips, and shredded coconut.  Vegetables  Eggplant, zucchini, cucumber, peppers, green beans, bean  "sprouts, lettuce, arugula, kale, Swiss chard, spinach, rickie greens, bok dimas, summer squash, potato, and tomato. Limited amounts of corn, carrot, and sweet potato. Green parts of scallions.  Grains  Gluten-free grains, such as rice, oats, buckwheat, quinoa, corn, polenta, and millet. Gluten-free pasta, bread, or cereal. Rice noodles. Corn tortillas.  Meats and other proteins  Unseasoned beef, pork, poultry, or fish. Eggs. Driscoll. Tofu (firm) and tempeh. Limited amounts of nuts and seeds, such as almonds, walnuts, brazil nuts, pecans, peanuts, nut butters, pumpkin seeds, deann seeds, and sunflower seeds.  Dairy  Lactose-free milk, yogurt, and kefir. Lactose-free cottage cheese and ice cream. Non-dairy milks, such as almond, coconut, hemp, and rice milk. Non-dairy yogurt. Limited amounts of goat cheese, brie, mozzarella, parmesan, swiss, and other hard cheeses.  Fats and oils  Butter-free spreads. Vegetable oils, such as olive, canola, and sunflower oil.  Seasoning and other foods  Artificial sweeteners with names that do not end in \"ol,\" such as aspartame, saccharine, and stevia. Maple syrup, white table sugar, raw sugar, brown sugar, and molasses. Mayonnaise, soy sauce, and tamari. Fresh basil, coriander, parsley, rosemary, and thyme.  Beverages  Water and mineral water. Sugar-sweetened soft drinks. Small amounts of orange juice or cranberry juice. Black and green tea. Most dry kan. Coffee.  The items listed above may not be a complete list of foods and beverages you can eat. Contact a dietitian for more information.    Foods to avoid  Fruits  Fresh, dried, and juiced forms of apple, pear, watermelon, peach, plum, cherries, apricots, blackberries, boysenberries, figs, nectarines, and johny. Avocado.  Vegetables  Chicory root, artichoke, asparagus, cabbage, snow peas, [place] sprouts, broccoli, sugar snap peas, mushrooms, celery, and cauliflower. Onions, garlic, leeks, and the white part of " scallions.  Grains  Wheat, including kamut, durum, and semolina. Barley and bulgur. Couscous. Wheat-based cereals. Wheat noodles, bread, crackers, and pastries.  Meats and other proteins  Fried or fatty meat. Sausage. Cashews and pistachios. Soybeans, baked beans, black beans, chickpeas, kidney beans, leidy beans, navy beans, lentils, black-eyed peas, and split peas.  Dairy  Milk, yogurt, ice cream, and soft cheese. Cream and sour cream. Milk-based sauces. Custard. Buttermilk. Soy milk.  Seasoning and other foods  Any sugar-free gum or candy. Foods that contain artificial sweeteners such as sorbitol, mannitol, isomalt, or xylitol. Foods that contain honey, high-fructose corn syrup, or agave. Bouillon, vegetable stock, beef stock, and chicken stock. Garlic and onion powder. Condiments made with onion, such as hummus, chutney, pickles, relish, salad dressing, and salsa. Tomato paste.  Beverages  Chicory-based drinks. Coffee substitutes. Chamomile tea. Fennel tea. Sweet or fortified kan such as port or amira. Diet soft drinks made with isomalt, mannitol, maltitol, sorbitol, or xylitol. Apple, pear, and johny juice. Juices with high-fructose corn syrup.  The items listed above may not be a complete list of foods and beverages you should avoid. Contact a dietitian for more information.  Summary  FODMAP stands for fermentable oligosaccharides, disaccharides, monosaccharides, and polyols. These are sugars that are hard for some people to digest.  A low-FODMAP eating plan is a short-term diet that helps to ease symptoms of certain bowel diseases.  The eating plan usually lasts up to 6 weeks. After that, high-FODMAP foods are reintroduced gradually and one at a time. This can help you find out which foods may be causing symptoms.  A low-FODMAP eating plan can be complicated. It is best to work with a dietitian who has experience with this type of plan.  This information is not intended to replace advice given to you by  your health care provider. Make sure you discuss any questions you have with your health care provider.  Document Revised: 05/06/2021 Document Reviewed: 05/06/2021  [employer] Patient Education © 2022 [employer]
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
## 2023-08-22 NOTE — PROGRESS NOTES
Patient: Latonya Mei  YOB: 1950    Date: 08/28/2023    Primary Care Provider: Chrystal Buckley APRN    Chief Complaint   Patient presents with    Follow-up       History:   Patient is here for a one year follow up for her thyroid. Patient is status post left thyroid lobectomy. She states that she is doing well and has no complaints.  Ultrasound today indicates small cyst in the right side and a previous left thyroid lobectomy.  No symptoms.    The following portions of the patient's history were reviewed and updated as appropriate: allergies, current medications, past family history, past medical history, past social history, past surgical history and problem list.    Review of Systems   Constitutional:  Negative for chills, fever and unexpected weight change.   HENT:  Negative for hearing loss, trouble swallowing and voice change.    Eyes:  Negative for visual disturbance.   Respiratory:  Negative for apnea, cough, chest tightness, shortness of breath and wheezing.    Cardiovascular:  Negative for chest pain, palpitations and leg swelling.   Gastrointestinal:  Negative for abdominal distention, abdominal pain, anal bleeding, blood in stool, constipation, diarrhea, nausea, rectal pain and vomiting.   Endocrine: Negative for cold intolerance and heat intolerance.   Genitourinary:  Negative for difficulty urinating, dysuria and flank pain.   Musculoskeletal:  Negative for back pain and gait problem.   Skin:  Negative for color change, rash and wound.   Neurological:  Negative for dizziness, syncope, speech difficulty, weakness, light-headedness, numbness and headaches.   Hematological:  Negative for adenopathy. Does not bruise/bleed easily.   Psychiatric/Behavioral:  Negative for confusion. The patient is not nervous/anxious.      Vital Signs  Vitals:    08/28/23 1347   BP: 142/78   BP Location: Left arm   Pulse: 78   Resp: 16   Temp: 97.2 øF (36.2 øC)   TempSrc: Temporal   SpO2: 96%   Weight:  "75.8 kg (167 lb)   Height: 167.6 cm (66\")       Allergies:  Allergies   Allergen Reactions    Dust Mite Extract Cough    Grass Cough    Latex Hives     AND ITCHING    Pineapple Itching    Rye Cough    Tuberculin Tests Itching    Wheat Bran Cough       Medications:    Current Outpatient Medications:     ACCU-CHEK FASTCLIX LANCETS misc, TEST 1-2 TIMES DAILY, Disp: 50 each, Rfl: 12    acetaminophen (TYLENOL) 500 MG tablet, Take 1 tablet by mouth As Needed for Mild Pain., Disp: , Rfl:     aspirin 81 MG chewable tablet, Chew 1 tablet Daily., Disp: , Rfl:     bisacodyl (DULCOLAX) 5 MG EC tablet, Take 1 tablet by mouth Daily As Needed for Constipation., Disp: , Rfl:     ezetimibe (ZETIA) 10 MG tablet, Take 1 tablet by mouth Daily., Disp: , Rfl:     glucose blood (ACCU-CHEK GUIDE) test strip, Test 1-2 Times daily, Disp: 50 each, Rfl: 12    levothyroxine (SYNTHROID, LEVOTHROID) 25 MCG tablet, Take 1 tablet by mouth Daily., Disp: 90 tablet, Rfl: 2    metFORMIN ER (GLUCOPHAGE-XR) 500 MG 24 hr tablet, Take 1 tablet by mouth Daily With Breakfast. (Patient taking differently: Take 1 tablet by mouth 2 (Two) Times a Day.), Disp: 90 tablet, Rfl: 2    metoprolol succinate XL (TOPROL-XL) 50 MG 24 hr tablet, TAKE ONE TABLET BY MOUTH DAILY, Disp: 90 tablet, Rfl: 3    omeprazole (priLOSEC) 40 MG capsule, TAKE ONE CAPSULE BY MOUTH 30 MINUTES BEFORE BREAKFAST DAILY. Needs office visit for further refills. (Patient taking differently: 1 capsule Daily. TAKE ONE CAPSULE BY MOUTH 30 MINUTES BEFORE BREAKFAST DAILY. Needs office visit for further refills.), Disp: 30 capsule, Rfl: 0    promethazine (PHENERGAN) 12.5 MG tablet, Take 1 tablet by mouth Every 8 (Eight) Hours As Needed for Nausea or Vomiting., Disp: 30 tablet, Rfl: 1    venlafaxine XR (EFFEXOR-XR) 75 MG 24 hr capsule, 1 capsule Daily., Disp: , Rfl:     vitamin B-12 (CYANOCOBALAMIN) 1000 MCG tablet, Take 1 tablet by mouth Daily., Disp: , Rfl:     VITAMIN D, CHOLECALCIFEROL, PO, Take " 1,000 Units by mouth Daily., Disp: , Rfl:     rosuvastatin (CRESTOR) 40 MG tablet, Take 1 tablet by mouth Daily. (Patient not taking: Reported on 8/28/2023), Disp: , Rfl:     Trulicity 0.75 MG/0.5ML solution pen-injector, Inject  under the skin into the appropriate area as directed 1 (One) Time Per Week. (Patient not taking: Reported on 8/28/2023), Disp: , Rfl:     Trulicity 1.5 MG/0.5ML solution pen-injector, , Disp: , Rfl:     Physical Exam:   General Appearance:    Alert, cooperative, in no acute distress   Head:    Normocephalic, without obvious abnormality, atraumatic  Neck-well-healed scar, no thyroid tissue palpated.   Lungs:     Clear to auscultation,respirations regular, even and                  unlabored    Heart:    Regular rhythm and normal rate, normal S1 and S2, no            murmur, no gallop, no rub, no click   Abdomen:     Normal bowel sounds, no masses, no organomegaly, soft        non-tender, non-distended, no guarding, no rebound                tenderness   Extremities:   Moves all extremities well, no edema, no cyanosis, no             redness   Pulses:   Pulses palpable and equal bilaterally   Skin:   No bleeding, bruising or rash     Results Review:   I reviewed the patient's new clinical results.     Review of Systems was reviewed and confirmed as accurate as documented by the MA.    ASSESSMENT/PLAN:    1. Thyroid nodule       History of left lobectomy, no recurrent nodule or cyst.  Follow-up in 1 year.    Electronically signed by Pao John MD  08/28/23

## 2023-08-28 ENCOUNTER — OFFICE VISIT (OUTPATIENT)
Dept: SURGERY | Facility: CLINIC | Age: 73
End: 2023-08-28
Payer: MEDICARE

## 2023-08-28 VITALS
BODY MASS INDEX: 26.84 KG/M2 | SYSTOLIC BLOOD PRESSURE: 142 MMHG | DIASTOLIC BLOOD PRESSURE: 78 MMHG | WEIGHT: 167 LBS | OXYGEN SATURATION: 96 % | HEART RATE: 78 BPM | RESPIRATION RATE: 16 BRPM | TEMPERATURE: 97.2 F | HEIGHT: 66 IN

## 2023-08-28 DIAGNOSIS — E04.1 THYROID NODULE: Primary | ICD-10-CM

## 2023-08-28 PROCEDURE — 3078F DIAST BP <80 MM HG: CPT | Performed by: SURGERY

## 2023-08-28 PROCEDURE — 99212 OFFICE O/P EST SF 10 MIN: CPT | Performed by: SURGERY

## 2023-08-28 PROCEDURE — 1160F RVW MEDS BY RX/DR IN RCRD: CPT | Performed by: SURGERY

## 2023-08-28 PROCEDURE — 3077F SYST BP >= 140 MM HG: CPT | Performed by: SURGERY

## 2023-08-28 PROCEDURE — 1159F MED LIST DOCD IN RCRD: CPT | Performed by: SURGERY

## 2023-08-28 RX ORDER — DULAGLUTIDE 1.5 MG/.5ML
INJECTION, SOLUTION SUBCUTANEOUS
Status: ON HOLD | COMMUNITY
Start: 2023-06-12 | End: 2023-08-31

## 2023-08-31 ENCOUNTER — HOSPITAL ENCOUNTER (OUTPATIENT)
Facility: HOSPITAL | Age: 73
Setting detail: OBSERVATION
Discharge: HOME | End: 2023-09-02
Attending: EMERGENCY MEDICINE
Payer: MEDICARE

## 2023-08-31 ENCOUNTER — TELEPHONE (OUTPATIENT)
Dept: GASTROENTEROLOGY | Facility: CLINIC | Age: 73
End: 2023-08-31
Payer: MEDICARE

## 2023-08-31 ENCOUNTER — APPOINTMENT (OUTPATIENT)
Dept: CT IMAGING | Facility: HOSPITAL | Age: 73
End: 2023-08-31
Payer: MEDICARE

## 2023-08-31 DIAGNOSIS — K62.5 RECTAL BLEEDING: ICD-10-CM

## 2023-08-31 DIAGNOSIS — K55.9 ISCHEMIC COLITIS: Primary | ICD-10-CM

## 2023-08-31 PROBLEM — K52.9 COLITIS: Status: ACTIVE | Noted: 2023-08-31

## 2023-08-31 LAB
ALBUMIN SERPL-MCNC: 4.4 G/DL (ref 3.5–5.2)
ALBUMIN/GLOB SERPL: 1.9 G/DL
ALP SERPL-CCNC: 61 U/L (ref 39–117)
ALT SERPL W P-5'-P-CCNC: 20 U/L (ref 1–33)
ANION GAP SERPL CALCULATED.3IONS-SCNC: 8.9 MMOL/L (ref 5–15)
AST SERPL-CCNC: 24 U/L (ref 1–32)
BASOPHILS # BLD AUTO: 0.05 10*3/MM3 (ref 0–0.2)
BASOPHILS NFR BLD AUTO: 0.7 % (ref 0–1.5)
BILIRUB SERPL-MCNC: 0.5 MG/DL (ref 0–1.2)
BUN SERPL-MCNC: 18 MG/DL (ref 8–23)
BUN/CREAT SERPL: 19.8 (ref 7–25)
CALCIUM SPEC-SCNC: 9.2 MG/DL (ref 8.6–10.5)
CHLORIDE SERPL-SCNC: 104 MMOL/L (ref 98–107)
CO2 SERPL-SCNC: 26.1 MMOL/L (ref 22–29)
CREAT SERPL-MCNC: 0.91 MG/DL (ref 0.57–1)
D-LACTATE SERPL-SCNC: 1.1 MMOL/L (ref 0.5–2)
DEPRECATED RDW RBC AUTO: 42.2 FL (ref 37–54)
EGFRCR SERPLBLD CKD-EPI 2021: 66.3 ML/MIN/1.73
EOSINOPHIL # BLD AUTO: 0.1 10*3/MM3 (ref 0–0.4)
EOSINOPHIL NFR BLD AUTO: 1.4 % (ref 0.3–6.2)
ERYTHROCYTE [DISTWIDTH] IN BLOOD BY AUTOMATED COUNT: 12.3 % (ref 12.3–15.4)
GLOBULIN UR ELPH-MCNC: 2.3 GM/DL
GLUCOSE BLDC GLUCOMTR-MCNC: 99 MG/DL (ref 70–130)
GLUCOSE SERPL-MCNC: 138 MG/DL (ref 65–99)
HCT VFR BLD AUTO: 37.5 % (ref 34–46.6)
HEMOCCULT STL QL: POSITIVE
HGB BLD-MCNC: 12.4 G/DL (ref 12–15.9)
HOLD SPECIMEN: NORMAL
HOLD SPECIMEN: NORMAL
IMM GRANULOCYTES # BLD AUTO: 0.02 10*3/MM3 (ref 0–0.05)
IMM GRANULOCYTES NFR BLD AUTO: 0.3 % (ref 0–0.5)
LYMPHOCYTES # BLD AUTO: 2.04 10*3/MM3 (ref 0.7–3.1)
LYMPHOCYTES NFR BLD AUTO: 28.3 % (ref 19.6–45.3)
MCH RBC QN AUTO: 30.8 PG (ref 26.6–33)
MCHC RBC AUTO-ENTMCNC: 33.1 G/DL (ref 31.5–35.7)
MCV RBC AUTO: 93.3 FL (ref 79–97)
MONOCYTES # BLD AUTO: 0.43 10*3/MM3 (ref 0.1–0.9)
MONOCYTES NFR BLD AUTO: 6 % (ref 5–12)
NEUTROPHILS NFR BLD AUTO: 4.58 10*3/MM3 (ref 1.7–7)
NEUTROPHILS NFR BLD AUTO: 63.3 % (ref 42.7–76)
NRBC BLD AUTO-RTO: 0 /100 WBC (ref 0–0.2)
PLATELET # BLD AUTO: 240 10*3/MM3 (ref 140–450)
PMV BLD AUTO: 9.7 FL (ref 6–12)
POTASSIUM SERPL-SCNC: 4.3 MMOL/L (ref 3.5–5.2)
PROT SERPL-MCNC: 6.7 G/DL (ref 6–8.5)
RBC # BLD AUTO: 4.02 10*6/MM3 (ref 3.77–5.28)
SODIUM SERPL-SCNC: 139 MMOL/L (ref 136–145)
WBC NRBC COR # BLD: 7.22 10*3/MM3 (ref 3.4–10.8)
WHOLE BLOOD HOLD COAG: NORMAL
WHOLE BLOOD HOLD SPECIMEN: NORMAL

## 2023-08-31 PROCEDURE — G0378 HOSPITAL OBSERVATION PER HR: HCPCS

## 2023-08-31 PROCEDURE — 87040 BLOOD CULTURE FOR BACTERIA: CPT

## 2023-08-31 PROCEDURE — 82272 OCCULT BLD FECES 1-3 TESTS: CPT | Performed by: EMERGENCY MEDICINE

## 2023-08-31 PROCEDURE — 25010000002 PIPERACILLIN SOD-TAZOBACTAM PER 1 G

## 2023-08-31 PROCEDURE — 80053 COMPREHEN METABOLIC PANEL: CPT | Performed by: EMERGENCY MEDICINE

## 2023-08-31 PROCEDURE — 96374 THER/PROPH/DIAG INJ IV PUSH: CPT

## 2023-08-31 PROCEDURE — 25510000001 IOPAMIDOL 61 % SOLUTION: Performed by: EMERGENCY MEDICINE

## 2023-08-31 PROCEDURE — 36415 COLL VENOUS BLD VENIPUNCTURE: CPT

## 2023-08-31 PROCEDURE — 82948 REAGENT STRIP/BLOOD GLUCOSE: CPT

## 2023-08-31 PROCEDURE — 96375 TX/PRO/DX INJ NEW DRUG ADDON: CPT

## 2023-08-31 PROCEDURE — 99285 EMERGENCY DEPT VISIT HI MDM: CPT

## 2023-08-31 PROCEDURE — 25010000002 MORPHINE PER 10 MG: Performed by: EMERGENCY MEDICINE

## 2023-08-31 PROCEDURE — 74177 CT ABD & PELVIS W/CONTRAST: CPT

## 2023-08-31 PROCEDURE — 83605 ASSAY OF LACTIC ACID: CPT | Performed by: EMERGENCY MEDICINE

## 2023-08-31 PROCEDURE — 99223 1ST HOSP IP/OBS HIGH 75: CPT | Performed by: STUDENT IN AN ORGANIZED HEALTH CARE EDUCATION/TRAINING PROGRAM

## 2023-08-31 PROCEDURE — 25010000002 ONDANSETRON PER 1 MG: Performed by: EMERGENCY MEDICINE

## 2023-08-31 PROCEDURE — 85025 COMPLETE CBC W/AUTO DIFF WBC: CPT

## 2023-08-31 RX ORDER — NITROGLYCERIN 0.4 MG/1
0.4 TABLET SUBLINGUAL
Status: DISCONTINUED | OUTPATIENT
Start: 2023-08-31 | End: 2023-09-02 | Stop reason: HOSPADM

## 2023-08-31 RX ORDER — SODIUM CHLORIDE 9 MG/ML
100 INJECTION, SOLUTION INTRAVENOUS CONTINUOUS
Status: DISCONTINUED | OUTPATIENT
Start: 2023-08-31 | End: 2023-09-02

## 2023-08-31 RX ORDER — LEVOTHYROXINE SODIUM 0.03 MG/1
25 TABLET ORAL DAILY
Status: DISCONTINUED | OUTPATIENT
Start: 2023-09-01 | End: 2023-09-02 | Stop reason: HOSPADM

## 2023-08-31 RX ORDER — ACETAMINOPHEN 325 MG/1
650 TABLET ORAL EVERY 4 HOURS PRN
Status: DISCONTINUED | OUTPATIENT
Start: 2023-08-31 | End: 2023-09-02 | Stop reason: HOSPADM

## 2023-08-31 RX ORDER — SODIUM CHLORIDE 0.9 % (FLUSH) 0.9 %
10 SYRINGE (ML) INJECTION EVERY 12 HOURS SCHEDULED
Status: DISCONTINUED | OUTPATIENT
Start: 2023-08-31 | End: 2023-09-02 | Stop reason: HOSPADM

## 2023-08-31 RX ORDER — BISACODYL 5 MG/1
5 TABLET, DELAYED RELEASE ORAL DAILY PRN
Status: DISCONTINUED | OUTPATIENT
Start: 2023-08-31 | End: 2023-09-02 | Stop reason: HOSPADM

## 2023-08-31 RX ORDER — NICOTINE POLACRILEX 4 MG
15 LOZENGE BUCCAL
Status: DISCONTINUED | OUTPATIENT
Start: 2023-08-31 | End: 2023-09-02 | Stop reason: HOSPADM

## 2023-08-31 RX ORDER — METOPROLOL SUCCINATE 50 MG/1
50 TABLET, EXTENDED RELEASE ORAL DAILY
Status: DISCONTINUED | OUTPATIENT
Start: 2023-09-01 | End: 2023-09-02 | Stop reason: HOSPADM

## 2023-08-31 RX ORDER — ONDANSETRON 2 MG/ML
4 INJECTION INTRAMUSCULAR; INTRAVENOUS EVERY 6 HOURS PRN
Status: DISCONTINUED | OUTPATIENT
Start: 2023-08-31 | End: 2023-09-02 | Stop reason: HOSPADM

## 2023-08-31 RX ORDER — ONDANSETRON 2 MG/ML
4 INJECTION INTRAMUSCULAR; INTRAVENOUS ONCE
Status: COMPLETED | OUTPATIENT
Start: 2023-08-31 | End: 2023-08-31

## 2023-08-31 RX ORDER — SODIUM CHLORIDE 0.9 % (FLUSH) 0.9 %
10 SYRINGE (ML) INJECTION AS NEEDED
Status: DISCONTINUED | OUTPATIENT
Start: 2023-08-31 | End: 2023-09-02 | Stop reason: HOSPADM

## 2023-08-31 RX ORDER — PANTOPRAZOLE SODIUM 40 MG/10ML
80 INJECTION, POWDER, LYOPHILIZED, FOR SOLUTION INTRAVENOUS ONCE
Status: COMPLETED | OUTPATIENT
Start: 2023-08-31 | End: 2023-08-31

## 2023-08-31 RX ORDER — PANTOPRAZOLE SODIUM 40 MG/10ML
40 INJECTION, POWDER, LYOPHILIZED, FOR SOLUTION INTRAVENOUS EVERY 12 HOURS
Status: DISCONTINUED | OUTPATIENT
Start: 2023-09-01 | End: 2023-09-02 | Stop reason: HOSPADM

## 2023-08-31 RX ORDER — INSULIN ASPART 100 [IU]/ML
1-200 INJECTION, SOLUTION INTRAVENOUS; SUBCUTANEOUS AS NEEDED
Status: DISCONTINUED | OUTPATIENT
Start: 2023-08-31 | End: 2023-09-02 | Stop reason: HOSPADM

## 2023-08-31 RX ORDER — SODIUM CHLORIDE 9 MG/ML
40 INJECTION, SOLUTION INTRAVENOUS AS NEEDED
Status: DISCONTINUED | OUTPATIENT
Start: 2023-08-31 | End: 2023-09-02 | Stop reason: HOSPADM

## 2023-08-31 RX ORDER — ACETAMINOPHEN 650 MG/1
650 SUPPOSITORY RECTAL EVERY 4 HOURS PRN
Status: DISCONTINUED | OUTPATIENT
Start: 2023-08-31 | End: 2023-09-02 | Stop reason: HOSPADM

## 2023-08-31 RX ORDER — INSULIN ASPART 100 [IU]/ML
1-200 INJECTION, SOLUTION INTRAVENOUS; SUBCUTANEOUS
Status: DISCONTINUED | OUTPATIENT
Start: 2023-08-31 | End: 2023-09-02 | Stop reason: HOSPADM

## 2023-08-31 RX ORDER — DEXTROSE MONOHYDRATE 25 G/50ML
10-50 INJECTION, SOLUTION INTRAVENOUS
Status: DISCONTINUED | OUTPATIENT
Start: 2023-08-31 | End: 2023-09-02 | Stop reason: HOSPADM

## 2023-08-31 RX ORDER — VENLAFAXINE HYDROCHLORIDE 75 MG/1
75 CAPSULE, EXTENDED RELEASE ORAL DAILY
Status: DISCONTINUED | OUTPATIENT
Start: 2023-09-01 | End: 2023-09-02 | Stop reason: HOSPADM

## 2023-08-31 RX ORDER — AMOXICILLIN 250 MG
2 CAPSULE ORAL 2 TIMES DAILY
Status: DISCONTINUED | OUTPATIENT
Start: 2023-08-31 | End: 2023-09-02 | Stop reason: HOSPADM

## 2023-08-31 RX ORDER — POLYETHYLENE GLYCOL 3350 17 G/17G
17 POWDER, FOR SOLUTION ORAL DAILY PRN
Status: DISCONTINUED | OUTPATIENT
Start: 2023-08-31 | End: 2023-09-02 | Stop reason: HOSPADM

## 2023-08-31 RX ORDER — ACETAMINOPHEN 325 MG/1
975 TABLET ORAL ONCE
Status: COMPLETED | OUTPATIENT
Start: 2023-08-31 | End: 2023-08-31

## 2023-08-31 RX ORDER — ACETAMINOPHEN 160 MG/5ML
650 SOLUTION ORAL EVERY 4 HOURS PRN
Status: DISCONTINUED | OUTPATIENT
Start: 2023-08-31 | End: 2023-09-02 | Stop reason: HOSPADM

## 2023-08-31 RX ORDER — BISACODYL 10 MG
10 SUPPOSITORY, RECTAL RECTAL DAILY PRN
Status: DISCONTINUED | OUTPATIENT
Start: 2023-08-31 | End: 2023-09-02 | Stop reason: HOSPADM

## 2023-08-31 RX ORDER — IBUPROFEN 600 MG/1
1 TABLET ORAL
Status: DISCONTINUED | OUTPATIENT
Start: 2023-08-31 | End: 2023-09-02 | Stop reason: HOSPADM

## 2023-08-31 RX ADMIN — SODIUM CHLORIDE 1000 ML: 9 INJECTION, SOLUTION INTRAVENOUS at 19:05

## 2023-08-31 RX ADMIN — PANTOPRAZOLE SODIUM 80 MG: 40 INJECTION, POWDER, FOR SOLUTION INTRAVENOUS at 16:40

## 2023-08-31 RX ADMIN — ONDANSETRON 4 MG: 2 INJECTION INTRAMUSCULAR; INTRAVENOUS at 19:06

## 2023-08-31 RX ADMIN — SODIUM CHLORIDE 100 ML/HR: 9 INJECTION, SOLUTION INTRAVENOUS at 21:50

## 2023-08-31 RX ADMIN — MORPHINE SULFATE 4 MG: 4 INJECTION, SOLUTION INTRAMUSCULAR; INTRAVENOUS at 19:06

## 2023-08-31 RX ADMIN — IOPAMIDOL 100 ML: 612 INJECTION, SOLUTION INTRAVENOUS at 17:39

## 2023-08-31 RX ADMIN — PIPERACILLIN SODIUM AND TAZOBACTAM SODIUM 3.38 G: 3; .375 INJECTION, SOLUTION INTRAVENOUS at 19:06

## 2023-08-31 RX ADMIN — ACETAMINOPHEN 975 MG: 325 TABLET, FILM COATED ORAL at 16:41
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
## 2023-08-31 NOTE — TELEPHONE ENCOUNTER
----- Message from Diogo Mathis sent at 8/31/2023  1:47 PM EDT -----  The patient called her PCP office, who called our office.  The patient told them she is passing blood, up to a cup or more.  They recommended that she go to the emergency room but she stated she was waiting to hear from our office.  They are asking that someone in our office call the patient.

## 2023-08-31 NOTE — TELEPHONE ENCOUNTER
Called patient back, she is still having lower abd cramping like she is going to have a BM but when she goes, it is blood.  Told patient to go to the ER, as it sounds like she is bleeding actively. The patient states understanding.

## 2023-09-01 LAB
ANION GAP SERPL CALCULATED.3IONS-SCNC: 7.9 MMOL/L (ref 5–15)
BASOPHILS # BLD AUTO: 0.04 10*3/MM3 (ref 0–0.2)
BASOPHILS NFR BLD AUTO: 0.6 % (ref 0–1.5)
BUN SERPL-MCNC: 12 MG/DL (ref 8–23)
BUN/CREAT SERPL: 13.3 (ref 7–25)
CALCIUM SPEC-SCNC: 8.5 MG/DL (ref 8.6–10.5)
CHLORIDE SERPL-SCNC: 108 MMOL/L (ref 98–107)
CO2 SERPL-SCNC: 27.1 MMOL/L (ref 22–29)
CREAT SERPL-MCNC: 0.9 MG/DL (ref 0.57–1)
DEPRECATED RDW RBC AUTO: 43.2 FL (ref 37–54)
EGFRCR SERPLBLD CKD-EPI 2021: 67.2 ML/MIN/1.73
EOSINOPHIL # BLD AUTO: 0.12 10*3/MM3 (ref 0–0.4)
EOSINOPHIL NFR BLD AUTO: 1.8 % (ref 0.3–6.2)
ERYTHROCYTE [DISTWIDTH] IN BLOOD BY AUTOMATED COUNT: 12.3 % (ref 12.3–15.4)
GLUCOSE BLDC GLUCOMTR-MCNC: 104 MG/DL (ref 70–130)
GLUCOSE BLDC GLUCOMTR-MCNC: 131 MG/DL (ref 70–130)
GLUCOSE BLDC GLUCOMTR-MCNC: 141 MG/DL (ref 70–130)
GLUCOSE BLDC GLUCOMTR-MCNC: 99 MG/DL (ref 70–130)
GLUCOSE SERPL-MCNC: 116 MG/DL (ref 65–99)
HCT VFR BLD AUTO: 34.4 % (ref 34–46.6)
HGB BLD-MCNC: 11 G/DL (ref 12–15.9)
IMM GRANULOCYTES # BLD AUTO: 0.02 10*3/MM3 (ref 0–0.05)
IMM GRANULOCYTES NFR BLD AUTO: 0.3 % (ref 0–0.5)
LYMPHOCYTES # BLD AUTO: 2.06 10*3/MM3 (ref 0.7–3.1)
LYMPHOCYTES NFR BLD AUTO: 30.7 % (ref 19.6–45.3)
MCH RBC QN AUTO: 30.5 PG (ref 26.6–33)
MCHC RBC AUTO-ENTMCNC: 32 G/DL (ref 31.5–35.7)
MCV RBC AUTO: 95.3 FL (ref 79–97)
MONOCYTES # BLD AUTO: 0.48 10*3/MM3 (ref 0.1–0.9)
MONOCYTES NFR BLD AUTO: 7.1 % (ref 5–12)
NEUTROPHILS NFR BLD AUTO: 4 10*3/MM3 (ref 1.7–7)
NEUTROPHILS NFR BLD AUTO: 59.5 % (ref 42.7–76)
NRBC BLD AUTO-RTO: 0 /100 WBC (ref 0–0.2)
PLATELET # BLD AUTO: 203 10*3/MM3 (ref 140–450)
PMV BLD AUTO: 9.8 FL (ref 6–12)
POTASSIUM SERPL-SCNC: 4.1 MMOL/L (ref 3.5–5.2)
RBC # BLD AUTO: 3.61 10*6/MM3 (ref 3.77–5.28)
SODIUM SERPL-SCNC: 143 MMOL/L (ref 136–145)
WBC NRBC COR # BLD: 6.72 10*3/MM3 (ref 3.4–10.8)

## 2023-09-01 PROCEDURE — 99232 SBSQ HOSP IP/OBS MODERATE 35: CPT | Performed by: INTERNAL MEDICINE

## 2023-09-01 PROCEDURE — 80048 BASIC METABOLIC PNL TOTAL CA: CPT | Performed by: STUDENT IN AN ORGANIZED HEALTH CARE EDUCATION/TRAINING PROGRAM

## 2023-09-01 PROCEDURE — 82948 REAGENT STRIP/BLOOD GLUCOSE: CPT

## 2023-09-01 PROCEDURE — 96376 TX/PRO/DX INJ SAME DRUG ADON: CPT

## 2023-09-01 PROCEDURE — G0378 HOSPITAL OBSERVATION PER HR: HCPCS

## 2023-09-01 PROCEDURE — 85025 COMPLETE CBC W/AUTO DIFF WBC: CPT | Performed by: STUDENT IN AN ORGANIZED HEALTH CARE EDUCATION/TRAINING PROGRAM

## 2023-09-01 PROCEDURE — 97161 PT EVAL LOW COMPLEX 20 MIN: CPT

## 2023-09-01 PROCEDURE — 99222 1ST HOSP IP/OBS MODERATE 55: CPT | Performed by: SURGERY

## 2023-09-01 PROCEDURE — 96361 HYDRATE IV INFUSION ADD-ON: CPT

## 2023-09-01 PROCEDURE — 25010000002 PIPERACILLIN SOD-TAZOBACTAM PER 1 G: Performed by: STUDENT IN AN ORGANIZED HEALTH CARE EDUCATION/TRAINING PROGRAM

## 2023-09-01 PROCEDURE — 97165 OT EVAL LOW COMPLEX 30 MIN: CPT

## 2023-09-01 RX ORDER — BUTALBITAL, ACETAMINOPHEN AND CAFFEINE 50; 325; 40 MG/1; MG/1; MG/1
2 TABLET ORAL EVERY 4 HOURS PRN
Status: DISCONTINUED | OUTPATIENT
Start: 2023-09-01 | End: 2023-09-02 | Stop reason: HOSPADM

## 2023-09-01 RX ADMIN — ACETAMINOPHEN 650 MG: 325 TABLET, FILM COATED ORAL at 18:27

## 2023-09-01 RX ADMIN — BUTALBITAL, ACETAMINOPHEN, AND CAFFEINE 2 TABLET: 50; 325; 40 TABLET ORAL at 19:50

## 2023-09-01 RX ADMIN — PANTOPRAZOLE SODIUM 40 MG: 40 INJECTION, POWDER, FOR SOLUTION INTRAVENOUS at 04:58

## 2023-09-01 RX ADMIN — PIPERACILLIN SODIUM AND TAZOBACTAM SODIUM 3.38 G: 3; .375 INJECTION, SOLUTION INTRAVENOUS at 16:09

## 2023-09-01 RX ADMIN — Medication 10 ML: at 08:27

## 2023-09-01 RX ADMIN — VENLAFAXINE HYDROCHLORIDE 75 MG: 75 CAPSULE, EXTENDED RELEASE ORAL at 08:23

## 2023-09-01 RX ADMIN — ACETAMINOPHEN 650 MG: 325 TABLET, FILM COATED ORAL at 02:42

## 2023-09-01 RX ADMIN — PIPERACILLIN SODIUM AND TAZOBACTAM SODIUM 3.38 G: 3; .375 INJECTION, SOLUTION INTRAVENOUS at 01:50

## 2023-09-01 RX ADMIN — PIPERACILLIN SODIUM AND TAZOBACTAM SODIUM 3.38 G: 3; .375 INJECTION, SOLUTION INTRAVENOUS at 08:23

## 2023-09-01 RX ADMIN — PANTOPRAZOLE SODIUM 40 MG: 40 INJECTION, POWDER, FOR SOLUTION INTRAVENOUS at 16:09

## 2023-09-01 RX ADMIN — SODIUM CHLORIDE 100 ML/HR: 9 INJECTION, SOLUTION INTRAVENOUS at 16:13

## 2023-09-01 RX ADMIN — METOPROLOL SUCCINATE 50 MG: 50 TABLET, EXTENDED RELEASE ORAL at 08:23

## 2023-09-01 RX ADMIN — ACETAMINOPHEN 650 MG: 325 TABLET, FILM COATED ORAL at 10:11
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
## 2023-09-01 NOTE — PROGRESS NOTES
[facility]    Name:  Latonya Mei   Age:  72 y.o.  Sex:  female  :  1950  MRN:  [id number]   Visit Number:  [id number]  Admission Date:  2023  Date Of Service:  23  Primary Care Physician:  Chrystal Buckley APRN     LOS: 0 days :    Chief Complaint:      Rectal bleeding    Subjective:    Patient resting comfortably in bed, family at bedside.  Complaining of cramping abdominal pain does noted significant improvement from admission.  Did have some blood noted in bowel movement this morning.  Currently hemodynamically stable.    Hospital Course:    Patient is a 72-year-old woman with past medical history of type 2 diabetes, hypertension, dyslipidemia, GERD, diverticulosis, colonic polyps, history of left lobectomy for thyroid nodule, prolapsed bladder, depression, back pain, fibromyalgia, full dentures, nonrheumatic aortic valve insufficiency, gastric ulcer.  Presented to [facility] ED on 2023 with concern for rectal bleeding onset same day, multiple episodes.  No history of GI bleeding.  ED summary: Afebrile, vital signs stable on room air. CMP unremarkable. Lactate not elevated. CBC unremarkable. Blood cultures collected. Fecal occult blood positive. CT abdomen/pelvis colitis involving the splenic flexure and descending portion of the colon that may represent ischemic colitis. No obvious hemorrhoids visualized, no obvious randee blood around the rectum, no anal fissure. ED spoke with general surgery-Dr. Cotto, recommended n.p.o., bowel rest, IV fluids, empiric antibiotic coverage with Zosyn, agreed to see in consult.     Review of Systems:     All systems were reviewed and negative except as mentioned in subjective, assessment and plan.    Vital Signs:    Temp:  [98.1 °F (36.7 °C)-98.4 °F (36.9 °C)] 98.4 °F (36.9 °C)  Heart Rate:  [63-90] 68  Resp:  [18] 18  BP: (115-175)/(67-80) 117/67    Intake and output:    No intake/output data recorded.  No  intake/output data recorded.    Physical Examination:    General Appearance:  Alert and cooperative.    Head:  Atraumatic and normocephalic.   Eyes: Conjunctivae and sclerae normal, no icterus. No pallor.   Throat: No oral lesions, no thrush, oral mucosa moist.   Neck: Supple, trachea midline, no thyromegaly.   Lungs:   Breath sounds heard bilaterally equally.  No wheezing or crackles. No Pleural rub or bronchial breathing.   Heart:  Normal S1 and S2, no murmur, no gallop, no rub. No JVD.   Abdomen:   Normal bowel sounds, no masses, no organomegaly. Soft, nontender, nondistended, no rebound tenderness.   Extremities: Supple, no edema, no cyanosis, no clubbing.   Skin: No bleeding or rash.   Neurologic: Alert and oriented x 3. No facial asymmetry. Moves all four limbs. No tremors.      Laboratory results:    Results from last 7 days   Lab Units 09/01/23  0515 08/31/23  1527   SODIUM mmol/L 143 139   POTASSIUM mmol/L 4.1 4.3   CHLORIDE mmol/L 108* 104   CO2 mmol/L 27.1 26.1   BUN mg/dL 12 18   CREATININE mg/dL 0.90 0.91   CALCIUM mg/dL 8.5* 9.2   BILIRUBIN mg/dL  --  0.5   ALK PHOS U/L  --  61   ALT (SGPT) U/L  --  20   AST (SGOT) U/L  --  24   GLUCOSE mg/dL 116* 138*     Results from last 7 days   Lab Units 09/01/23  0515 08/31/23  1527   WBC 10*3/mm3 6.72 7.22   HEMOGLOBIN g/dL 11.0* 12.4   HEMATOCRIT % 34.4 37.5   PLATELETS 10*3/mm3 203 240                 No results for input(s): PHART, HPT0RYG, PO2ART, IUH4VJN, BASEEXCESS in the last 8760 hours.   I have reviewed the patient's laboratory results.    Radiology results:    CT Abdomen Pelvis With Contrast    Result Date: 8/31/2023  FINAL REPORT TECHNIQUE: Routine axial images through the abdomen and pelvis were obtained following IV contrast administration. CLINICAL HISTORY: rectal bleeding, rule out GI bleed/diverticulitis. hx of diverticulitis FINDINGS: Abdomen: There are simple left renal cysts.  The gallbladder, solid abdominal organs and ureters are otherwise  unremarkable. There is significant circumferential wall thickening involving splenic flexure and the descending portions of the colon consistent with colitis, including ischemic colitis.  The remainder of the GI tract is unremarkable, with no sign of appendicitis.  Pelvis: The uterus and ovaries are absent.  The urinary bladder is unremarkable.   There is no pelvic or abdominal ascites, adenopathy or acute osseous abnormality.     Impression: Colitis involving the splenic flexure and descending portion of the colon that may represent ischemic colitis. Authenticated and Electronically Signed by Juventino Khan M.D. on 08/31/2023 07:00:22 PM   I have reviewed the patient's radiology reports.    Medication Review:     I have reviewed the patient's active and prn medications.     Problem List:      Colitis    Rectal bleeding      Assessment:    Rectal bleeding POA  Colitis POA  Hypertension  Hyperlipidemia  Type 2 diabetes  Depression  Fibromyalgia  Impaired mobility and ADL    Plan:    We will continue to monitor patient in the hospital.  H&H currently stable.  No indication for PRBC transfusion.  Dr. Cotto, general surgery consulted.  We will continue with empiric Zosyn for colitis.  IV fluids.  Supportive care.  Further orders as clinical course dictates.    DVT Prophylaxis: SCDs  Code Status: Full  Diet: N.p.o.  Discharge Plan: Home in 1 to 2 days    Timothy Paul DO  09/01/23  09:04 EDT    Dictated utilizing Dragon dictation.
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
## 2023-09-01 NOTE — CASE MANAGEMENT/SOCIAL WORK
Discharge Planning Assessment  [facility]     Patient Name: Latonya Mei  MRN: [id number]  Today's Date: 9/1/2023    Admit Date: 8/31/2023    Plan: DCP   Discharge Needs Assessment       Row Name 09/01/23 1207       Living Environment    People in Home spouse;sibling(s)    Current Living Arrangements home    Potentially Unsafe Housing Conditions none    Primary Care Provided by self    Provides Primary Care For other (see comments)  siblings    Able to Return to Prior Arrangements yes       Resource/Environmental Concerns    Transportation Concerns none       Transition Planning    Patient/Family Anticipates Transition to home    Patient/Family Anticipated Services at Transition none    Transportation Anticipated family or friend will provide       Discharge Needs Assessment    Readmission Within the Last 30 Days no previous admission in last 30 days    Equipment Currently Used at Home none    Concerns to be Addressed discharge planning    Anticipated Changes Related to Illness none    Equipment Needed After Discharge none                   Discharge Plan       Row Name 09/01/23 1208       Plan    Plan DCP    Patient/Family in Agreement with Plan yes    Plan Comments SW met with pt at bedside for discharge planning. Pt lives in [place] with spouse and cares for her sister. Pt is independent with ADL's and still drives. Pt does not use any DME or home O2. Pt confirmed PCP. Pt uses [email or web address]. Pt is not current with any HH/OP services. Pt denied any needs. Goal is home with family to transport. SW/CM will continue to follow for dc needs.    Final Discharge Disposition Code 01 - home or self-care                  Continued Care and Services - Admitted Since 8/31/2023    Coordination has not been started for this encounter.          Demographic Summary       Row Name 09/01/23 1206       General Information    Admission Type observation    Arrived From home    Required Notices Provided Observation Status  Notice    Referral Source admission list    Reason for Consult discharge planning    Preferred Language English                   Functional Status       Row Name 09/01/23 1207       Functional Status, IADL    Medications independent    Meal Preparation independent    Housekeeping independent    Laundry independent    Shopping independent       Mental Status Summary    Recent Changes in Mental Status/Cognitive Functioning unable to assess       Employment/    Employment Status retired                   Psychosocial       Row Name 09/01/23 1207       Developmental Stage (Eriksson's)    Developmental Stage Stage 8 (63 years-death/Late Adulthood) Integrity vs. Despair                   Abuse/Neglect    No documentation.                  Legal    No documentation.                  Substance Abuse    No documentation.                  Patient Forms    No documentation.                     PABLO Lee

## 2023-09-01 NOTE — PLAN OF CARE
Goal Outcome Evaluation:  Plan of Care Reviewed With: patient        Progress: no change  Outcome Evaluation: Pt participated in PT eval this date. Pt ambulating independently to the bathroom. Pt able to perform toleiting indpendently. Pt then ambulated back to the bed independently. Pt demonstrates no skilled PT needs at this time. PT to sign off.
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
## 2023-09-01 NOTE — PROGRESS NOTES
Pharmacokinetic Consult - Piperacillin-tazobactam Dosing    Latonya Mei is a 72 y.o. female who has been consulted to dose piperacillin-tazobactam for  intra-abdominal infection .    Current Antimicrobial Therapy    Anti-Infectives (From admission, onward)      Ordered     Dose/Rate Route Frequency Start Stop    08/31/23 2113  piperacillin-tazobactam (ZOSYN) 3.375 g in iso-osmotic dextrose 50 ml (premix)        Ordering Provider: Kerley, Brian Joseph, DO    3.375 g  over 4 Hours Intravenous Every 8 Hours 09/01/23 0100 09/08/23 0059    08/31/23 2031  Pharmacy to Dose Zosyn        Ordering Provider: Kerley, Brian Joseph, DO     Does not apply Continuous PRN 08/31/23 2030 09/07/23 2029 08/31/23 1827  piperacillin-tazobactam (ZOSYN) 3.375 g in iso-osmotic dextrose 50 ml (premix)        Ordering Provider: Mikel Rodríguez PA-C    3.375 g  100 mL/hr over 30 Minutes Intravenous Once 08/31/23 1843 08/31/23 1936            Microbiology Results (last 10 days)       ** No results found for the last 240 hours. **             Allergies  Dust mite extract, Grass, Latex, Pineapple, Rye, Tuberculin tests, and Wheat bran    Relevant clinical data and objective history reviewed:  Creatinine   Date Value Ref Range Status   08/31/2023 0.91 0.57 - 1.00 mg/dL Final     Estimated Creatinine Clearance: 56.4 mL/min (by C-G formula based on SCr of 0.91 mg/dL).  No intake/output data recorded.  Patient weight: 75.9 kg (167 lb 6.4 oz)    Asessment/Plan    Initiate Piperacillin-tazobactam 3.375 g IV every 8 hours  Pharmacy will monitor Ms. Mei's renal function and clinical status and adjust the piperacillin-tazobactam dose and/or frequency as needed.    Thank you,  Luly Ambrocio RPH,PharmD  8/31/2023  23:38 EDT

## 2023-09-01 NOTE — PLAN OF CARE
Goal Outcome Evaluation:  Plan of Care Reviewed With: patient        Progress: improving  Outcome Evaluation: VSS. Pt has had around 4 bm's RN witnessed that are liquid in consistency and bright red in color.
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
## 2023-09-01 NOTE — PLAN OF CARE
Goal Outcome Evaluation:  Plan of Care Reviewed With: patient, family        Progress: no change  Outcome Evaluation: Pt seen for OT evaluation today.  Pt is independent with self care and functional mobility tasks. She has no skilled OT needs at this time.  OT will sign off.      Anticipated Discharge Disposition (OT): home

## 2023-09-02 VITALS
TEMPERATURE: 98.3 F | HEART RATE: 66 BPM | SYSTOLIC BLOOD PRESSURE: 132 MMHG | OXYGEN SATURATION: 92 % | RESPIRATION RATE: 16 BRPM | WEIGHT: 173.5 LBS | DIASTOLIC BLOOD PRESSURE: 68 MMHG | BODY MASS INDEX: 27.88 KG/M2 | HEIGHT: 66 IN

## 2023-09-02 LAB
BASOPHILS # BLD AUTO: 0.04 10*3/MM3 (ref 0–0.2)
BASOPHILS NFR BLD AUTO: 0.9 % (ref 0–1.5)
DEPRECATED RDW RBC AUTO: 42.3 FL (ref 37–54)
EOSINOPHIL # BLD AUTO: 0.16 10*3/MM3 (ref 0–0.4)
EOSINOPHIL NFR BLD AUTO: 3.6 % (ref 0.3–6.2)
ERYTHROCYTE [DISTWIDTH] IN BLOOD BY AUTOMATED COUNT: 12.1 % (ref 12.3–15.4)
GLUCOSE BLDC GLUCOMTR-MCNC: 111 MG/DL (ref 70–130)
GLUCOSE BLDC GLUCOMTR-MCNC: 80 MG/DL (ref 70–130)
HCT VFR BLD AUTO: 33.5 % (ref 34–46.6)
HGB BLD-MCNC: 11.2 G/DL (ref 12–15.9)
IMM GRANULOCYTES # BLD AUTO: 0.01 10*3/MM3 (ref 0–0.05)
IMM GRANULOCYTES NFR BLD AUTO: 0.2 % (ref 0–0.5)
LYMPHOCYTES # BLD AUTO: 1.79 10*3/MM3 (ref 0.7–3.1)
LYMPHOCYTES NFR BLD AUTO: 40.3 % (ref 19.6–45.3)
MCH RBC QN AUTO: 31.4 PG (ref 26.6–33)
MCHC RBC AUTO-ENTMCNC: 33.4 G/DL (ref 31.5–35.7)
MCV RBC AUTO: 93.8 FL (ref 79–97)
MONOCYTES # BLD AUTO: 0.31 10*3/MM3 (ref 0.1–0.9)
MONOCYTES NFR BLD AUTO: 7 % (ref 5–12)
NEUTROPHILS NFR BLD AUTO: 2.13 10*3/MM3 (ref 1.7–7)
NEUTROPHILS NFR BLD AUTO: 48 % (ref 42.7–76)
NRBC BLD AUTO-RTO: 0 /100 WBC (ref 0–0.2)
PLATELET # BLD AUTO: 202 10*3/MM3 (ref 140–450)
PMV BLD AUTO: 10 FL (ref 6–12)
RBC # BLD AUTO: 3.57 10*6/MM3 (ref 3.77–5.28)
WBC NRBC COR # BLD: 4.44 10*3/MM3 (ref 3.4–10.8)

## 2023-09-02 PROCEDURE — 82948 REAGENT STRIP/BLOOD GLUCOSE: CPT

## 2023-09-02 PROCEDURE — 99232 SBSQ HOSP IP/OBS MODERATE 35: CPT | Performed by: SURGERY

## 2023-09-02 PROCEDURE — G0378 HOSPITAL OBSERVATION PER HR: HCPCS

## 2023-09-02 PROCEDURE — 85025 COMPLETE CBC W/AUTO DIFF WBC: CPT | Performed by: STUDENT IN AN ORGANIZED HEALTH CARE EDUCATION/TRAINING PROGRAM

## 2023-09-02 PROCEDURE — 96376 TX/PRO/DX INJ SAME DRUG ADON: CPT

## 2023-09-02 PROCEDURE — 25010000002 PIPERACILLIN SOD-TAZOBACTAM PER 1 G: Performed by: STUDENT IN AN ORGANIZED HEALTH CARE EDUCATION/TRAINING PROGRAM

## 2023-09-02 PROCEDURE — 99239 HOSP IP/OBS DSCHRG MGMT >30: CPT | Performed by: INTERNAL MEDICINE

## 2023-09-02 PROCEDURE — 96361 HYDRATE IV INFUSION ADD-ON: CPT

## 2023-09-02 RX ORDER — LEVOFLOXACIN 750 MG/1
750 TABLET ORAL DAILY
Qty: 3 TABLET | Refills: 0 | Status: SHIPPED | OUTPATIENT
Start: 2023-09-02 | End: 2023-09-05

## 2023-09-02 RX ORDER — METRONIDAZOLE 500 MG/1
500 TABLET ORAL 3 TIMES DAILY
Qty: 9 TABLET | Refills: 0 | Status: SHIPPED | OUTPATIENT
Start: 2023-09-02 | End: 2023-09-05

## 2023-09-02 RX ADMIN — PIPERACILLIN SODIUM AND TAZOBACTAM SODIUM 3.38 G: 3; .375 INJECTION, SOLUTION INTRAVENOUS at 00:41

## 2023-09-02 RX ADMIN — SODIUM CHLORIDE 100 ML/HR: 9 INJECTION, SOLUTION INTRAVENOUS at 01:18

## 2023-09-02 RX ADMIN — METOPROLOL SUCCINATE 50 MG: 50 TABLET, EXTENDED RELEASE ORAL at 08:02

## 2023-09-02 RX ADMIN — PANTOPRAZOLE SODIUM 40 MG: 40 INJECTION, POWDER, FOR SOLUTION INTRAVENOUS at 05:34

## 2023-09-02 RX ADMIN — LEVOTHYROXINE SODIUM 25 MCG: 25 TABLET ORAL at 05:34

## 2023-09-02 RX ADMIN — VENLAFAXINE HYDROCHLORIDE 75 MG: 75 CAPSULE, EXTENDED RELEASE ORAL at 08:02

## 2023-09-02 RX ADMIN — Medication 10 ML: at 08:04

## 2023-09-02 RX ADMIN — PIPERACILLIN SODIUM AND TAZOBACTAM SODIUM 3.38 G: 3; .375 INJECTION, SOLUTION INTRAVENOUS at 08:08

## 2023-09-02 NOTE — CASE MANAGEMENT/SOCIAL WORK
Case Management Discharge Note      Final Note: Pt. will be returning home with family. No discharge needs. Pt. family will provide discharge transport.         Selected Continued Care - Admitted Since 8/31/2023       Destination    No services have been selected for the patient.                Durable Medical Equipment    No services have been selected for the patient.                Dialysis/Infusion    No services have been selected for the patient.                Home Medical Care    No services have been selected for the patient.                Therapy    No services have been selected for the patient.                Community Resources    No services have been selected for the patient.                Community & DME    No services have been selected for the patient.                    Transportation Services  Private: Car    Final Discharge Disposition Code: 01 - home or self-care

## 2023-09-02 NOTE — PROGRESS NOTES
LOS: 0 days   Patient Care Team:  Chrystal Buckley APRN as PCP - General (Nurse Practitioner)  Pao John MD as Consulting Physician (General Surgery)  Glen Ríos MD as Consulting Physician (Cardiology)       Chief Complaint: Hematochezia    HPI: Patient without bowel movement and no significant further bleeding.  Abdominal pain minimal at this time.    ROS:    Vital Signs  Temp:  [98.2 °F (36.8 °C)-98.8 °F (37.1 °C)] 98.3 °F (36.8 °C)  Heart Rate:  [66-73] 66  Resp:  [16-18] 16  BP: (126-140)/(68-76) 132/68    Physical Exam:     General Appearance:  Alert and cooperative, not in any acute distress.   Cardiovascular/Heart:  Normal S1 and S2,    GI/Abdomen:   Benign                Results Review:       Lab Results (last 24 hours)       Procedure Component Value Units Date/Time    POC Glucose Once [[id number]]  (Normal) Collected: 09/02/23 1155    Specimen: Blood Updated: 09/02/23 1159     Glucose 111 mg/dL      Comment: Serial Number: [id number]:  [id number]       POC Glucose Once [[id number]]  (Normal) Collected: 09/02/23 0752    Specimen: Blood Updated: 09/02/23 0804     Glucose 80 mg/dL      Comment: Serial Number: [id number]:  [id number]       CBC Auto Differential [[id number]]  (Abnormal) Collected: 09/02/23 0633    Specimen: Blood Updated: 09/02/23 0710     WBC 4.44 10*3/mm3      RBC 3.57 10*6/mm3      Hemoglobin 11.2 g/dL      Hematocrit 33.5 %      MCV 93.8 fL      MCH 31.4 pg      MCHC 33.4 g/dL      RDW 12.1 %      RDW-SD 42.3 fl      MPV 10.0 fL      Platelets 202 10*3/mm3      Neutrophil % 48.0 %      Lymphocyte % 40.3 %      Monocyte % 7.0 %      Eosinophil % 3.6 %      Basophil % 0.9 %      Immature Grans % 0.2 %      Neutrophils, Absolute 2.13 10*3/mm3      Lymphocytes, Absolute 1.79 10*3/mm3      Monocytes, Absolute 0.31 10*3/mm3      Eosinophils, Absolute 0.16 10*3/mm3      Basophils, Absolute 0.04 10*3/mm3      Immature Grans, Absolute 0.01 10*3/mm3      nRBC 0.0 /100 WBC      POC Glucose Once [[id number]]  (Normal) Collected: 09/01/23 2014    Specimen: Blood Updated: 09/01/23 2029     Glucose 99 mg/dL      Comment: Serial Number: [id number]:  [id number]       Blood Culture - Blood, Arm, Left [[id number]]  (Normal) Collected: 08/31/23 1852    Specimen: Blood from Arm, Left Updated: 09/01/23 1901     Blood Culture No growth at 24 hours    Blood Culture - Blood, Arm, Left [[id number]]  (Normal) Collected: 08/31/23 1845    Specimen: Blood from Arm, Left Updated: 09/01/23 1901     Blood Culture No growth at 24 hours    POC Glucose Once [[id number]]  (Abnormal) Collected: 09/01/23 1637    Specimen: Blood Updated: 09/01/23 1646     Glucose 131 mg/dL      Comment: Serial Number: [id number]:  [id number]                   Assessment & Plan       Colitis    Rectal bleeding    Patient improving.  Tolerating a diet.  From my standpoint patient may be discharged.  Follow-up with GI as she is due for colonoscopy in the near future.  Finish course of antibiotics.    Jose Navas MD  09/02/23  13:47 EDT

## 2023-09-02 NOTE — PLAN OF CARE
Goal Outcome Evaluation:  Plan of Care Reviewed With: patient        Progress: improving  Outcome Evaluation: VSS overnight. Pt c/o HA that was controlled w/ PRN meds. IV fluids administered per order.

## 2023-09-02 NOTE — PLAN OF CARE
Goal Outcome Evaluation:  Plan of Care Reviewed With: patient        Progress: improving  Outcome Evaluation: Pt ready for discharge per MD.

## 2023-09-02 NOTE — DISCHARGE SUMMARY
[facility]   DISCHARGE SUMMARY      Name:  Latonya Mei   Age:  72 y.o.  Sex:  female  :  1950  MRN:  [id number]   Visit Number:  [id number]    Admission Date:  2023  Date of Discharge:  2023  Primary Care Physician:  Chrystal Buckley APRN      Discharge Diagnoses:     Rectal bleeding   Colitis   Hypertension  Hyperlipidemia  Type 2 diabetes  Depression  Fibromyalgia  Impaired mobility and ADL    Problem List:     Active Hospital Problems    Diagnosis  POA    Colitis [K52.9]  Yes    Rectal bleeding [K62.5]  Yes      Resolved Hospital Problems   No resolved problems to display.     Presenting Problem:    Chief Complaint   Patient presents with    Rectal Bleeding      Consults:     Consulting Physician(s)       Provider Relationship Specialty    Verona Cotto MD Consulting Physician General Surgery          Procedures Performed:        History of presenting illness/Hospital Course:    Patient is a 72-year-old woman with past medical history of type 2 diabetes, hypertension, dyslipidemia, GERD, diverticulosis, colonic polyps, history of left lobectomy for thyroid nodule, prolapsed bladder, depression, back pain, fibromyalgia, full dentures, nonrheumatic aortic valve insufficiency, gastric ulcer.  Presented to [facility] ED on 2023 with concern for rectal bleeding onset same day, multiple episodes.  No history of GI bleeding.  ED summary: Afebrile, vital signs stable on room air. CMP unremarkable. Lactate not elevated. CBC unremarkable. Blood cultures collected. Fecal occult blood positive. CT abdomen/pelvis colitis involving the splenic flexure and descending portion of the colon that may represent ischemic colitis. No obvious hemorrhoids visualized, no obvious randee blood around the rectum, no anal fissure. ED spoke with general surgery-Dr. Cotto, recommended n.p.o., bowel rest, IV fluids, empiric antibiotic coverage with Zosyn, agreed to see in consult.   No  difficulty while in the hospital.   Patient transition oral Levaquin and Flagyl at discharge to complete antibiotic course.  No further evidence of bleeding while inpatient.  Recommend outpatient follow-up with GI as patient is due for colonoscopy.  Tolerated diet well without difficulties on day of discharge.      Vital Signs:    Temp:  [98.2 °F (36.8 °C)-98.6 °F (37 °C)] 98.3 °F (36.8 °C)  Heart Rate:  [66-72] 66  Resp:  [16-18] 16  BP: (128-140)/(68-74) 132/68    Physical Exam:    General Appearance:  Alert and cooperative.    Head:  Atraumatic and normocephalic.   Eyes: Conjunctivae and sclerae normal, no icterus. No pallor.   Ears:  Ears with no abnormalities noted.   Throat: No oral lesions, no thrush, oral mucosa moist.   Neck: Supple, trachea midline, no thyromegaly.       Lungs:   Breath sounds heard bilaterally equally.  No crackles or wheezing. No Pleural rub or bronchial breathing.   Heart:  Normal S1 and S2, no murmur, no gallop, no rub. No JVD.   Abdomen:   Normal bowel sounds, no masses, no organomegaly. Soft, nontender, nondistended, no rebound tenderness.   Extremities: Supple, no edema, no cyanosis, no clubbing.   Pulses: Pulses palpable bilaterally.   Skin: No bleeding or rash.   Neurologic: Alert and oriented x 3. No facial asymmetry. Moves all four limbs. No tremors.     Pertinent Lab Results:     Results from last 7 days   Lab Units 09/01/23  0515 08/31/23  1527   SODIUM mmol/L 143 139   POTASSIUM mmol/L 4.1 4.3   CHLORIDE mmol/L 108* 104   CO2 mmol/L 27.1 26.1   BUN mg/dL 12 18   CREATININE mg/dL 0.90 0.91   CALCIUM mg/dL 8.5* 9.2   BILIRUBIN mg/dL  --  0.5   ALK PHOS U/L  --  61   ALT (SGPT) U/L  --  20   AST (SGOT) U/L  --  24   GLUCOSE mg/dL 116* 138*     Results from last 7 days   Lab Units 09/02/23  0633 09/01/23  0515 08/31/23  1527   WBC 10*3/mm3 4.44 6.72 7.22   HEMOGLOBIN g/dL 11.2* 11.0* 12.4   HEMATOCRIT % 33.5* 34.4 37.5   PLATELETS 10*3/mm3 202 203 240                              Results from last 7 days   Lab Units 08/31/23  1852 08/31/23  1845   BLOODCX  No growth at 24 hours No growth at 24 hours       Pertinent Radiology Results:    Imaging Results (All)       Procedure Component Value Units Date/Time    CT Abdomen Pelvis With Contrast [[id number]] Collected: 08/31/23 1900     Updated: 08/31/23 1901    Narrative:      FINAL REPORT    TECHNIQUE:  Routine axial images through the abdomen and pelvis were  obtained following IV contrast administration.    CLINICAL HISTORY:  rectal bleeding, rule out GI bleed/diverticulitis. hx of  diverticulitis    FINDINGS:  Abdomen: There are simple left renal cysts.  The gallbladder,  solid abdominal organs and ureters are otherwise unremarkable.  There is significant circumferential wall thickening involving  splenic flexure and the descending portions of the colon  consistent with colitis, including ischemic colitis.  The  remainder of the GI tract is unremarkable, with no sign of  appendicitis.  Pelvis: The uterus and ovaries are absent.  The  urinary bladder is unremarkable.   There is no pelvic or  abdominal ascites, adenopathy or acute osseous abnormality.      Impression:      Colitis involving the splenic flexure and descending portion of  the colon that may represent ischemic colitis.    Authenticated and Electronically Signed by Juventino Khan M.D. on  08/31/2023 07:00:22 PM            Echo:    Results for orders placed during the hospital encounter of 04/22/19    Adult Transthoracic Echo Complete W/ Cont if Necessary Per Protocol    Interpretation Summary  · Estimated EF = 65%.  · Left ventricular systolic function is normal.  · Mild aortic valve regurgitation is present.  · Calculated right ventricular systolic pressure from tricuspid regurgitation is 28 mmHg.    Condition on Discharge:      Stable.    Code status during the hospital stay:    Code Status and Medical Interventions:   Ordered at: 08/31/23 2314     Level Of Support Discussed With:     Patient     Code Status (Patient has no pulse and is not breathing):    CPR (Attempt to Resuscitate)     Medical Interventions (Patient has pulse or is breathing):    Full Support     Comments:    Only 1 resuscitation attempt, only 2 weeks on life support.     Discharge Disposition:    Home or Self Care    Discharge Medications:       Discharge Medications        New Medications        Instructions Start Date   levoFLOXacin 750 MG tablet  Commonly known as: Levaquin   750 mg, Oral, Daily      metroNIDAZOLE 500 MG tablet  Commonly known as: Flagyl   500 mg, Oral, 3 Times Daily             Changes to Medications        Instructions Start Date   omeprazole 40 MG capsule  Commonly known as: priLOSEC  What changed:   how much to take  when to take this   TAKE ONE CAPSULE BY MOUTH 30 MINUTES BEFORE BREAKFAST DAILY. Needs office visit for further refills.             Continue These Medications        Instructions Start Date   Accu-Chek FastClix Lancets misc   TEST 1-2 TIMES DAILY      acetaminophen 500 MG tablet  Commonly known as: TYLENOL   500 mg, Oral, As Needed      aspirin 81 MG chewable tablet   81 mg, Oral, Daily      ezetimibe 10 MG tablet  Commonly known as: ZETIA   10 mg, Oral, Daily      glucose blood test strip  Commonly known as: Accu-Chek Guide   Test 1-2 Times daily      levothyroxine 25 MCG tablet  Commonly known as: SYNTHROID, LEVOTHROID   25 mcg, Oral, Daily      metoprolol succinate XL 50 MG 24 hr tablet  Commonly known as: TOPROL-XL   TAKE ONE TABLET BY MOUTH DAILY      promethazine 12.5 MG tablet  Commonly known as: PHENERGAN   12.5 mg, Oral, Every 8 Hours PRN      rosuvastatin 40 MG tablet  Commonly known as: CRESTOR   40 mg, Oral, Daily      Trulicity 0.75 MG/0.5ML solution pen-injector  Generic drug: Dulaglutide   Subcutaneous, Weekly      venlafaxine XR 75 MG 24 hr capsule  Commonly known as: EFFEXOR-XR   1 capsule Daily.      vitamin B-12 1000 MCG tablet  Commonly known as: CYANOCOBALAMIN   1,000  mcg, Oral, Daily      VITAMIN D (CHOLECALCIFEROL) PO   1,000 Units, Oral, Daily             Discharge Diet:     Diet Instructions       Diet: Gastrointestinal Diets; Low Irritant, Fat-Restricted; Regular Texture (IDDSI 7); Thin (IDDSI 0)      Discharge Diet: Gastrointestinal Diets    Gastrointestinal Diet:  Low Irritant  Fat-Restricted       Texture: Regular Texture (IDDSI 7)    Fluid Consistency: Thin (IDDSI 0)          Activity at Discharge:     Activity Instructions       Activity as Tolerated            Follow-up Appointments:    Additional Instructions for the Follow-ups that You Need to Schedule       Discharge Follow-up with PCP   As directed       Currently Documented PCP:    Chrystal Buckley APRN    PCP Phone Number:    [phone]     Follow Up Details: 1-2 weeks               Follow-up Information       Chrystal Buckley APRN .    Specialty: Nurse Practitioner  Why: 1-2 weeks  Contact information:  [address]  [address]  [place] [address]  [phone]                           Future Appointments   Date Time Provider Department Center   12/12/2023  2:15 PM Glen Ríos MD [facility]   12/14/2023  1:30 PM Nia García APRN [facility]   8/26/2024  1:20 PM Pao John MD [facility]  [facility] [place] ([facility]     Test Results Pending at Discharge:    Pending Labs       Order Current Status    Blood Culture - Blood, Arm, Left Preliminary result    Blood Culture - Blood, Arm, Left Preliminary result               Timothy Paul DO  09/02/23  16:32 EDT    Time: I spent >30 minutes on this discharge activity which included: face-to-face encounter with the patient, reviewing the data in the system, coordination of the care with the nursing staff as well as consultants, documentation, and entering orders.     Dictated utilizing Dragon dictation.

## 2023-09-05 LAB
BACTERIA SPEC AEROBE CULT: NORMAL
BACTERIA SPEC AEROBE CULT: NORMAL

## 2023-09-13 ENCOUNTER — TRANSCRIBE ORDERS (OUTPATIENT)
Dept: ADMINISTRATIVE | Facility: HOSPITAL | Age: 73
End: 2023-09-13
Payer: MEDICARE

## 2023-09-13 DIAGNOSIS — Z12.31 VISIT FOR SCREENING MAMMOGRAM: Primary | ICD-10-CM

## 2023-12-13 NOTE — PROGRESS NOTES
"             [facility] Cardiology Office Follow Up Note    Latonya Mei  [id number]  2023    Primary Care Provider: Chrystal Buckley APRN   Referring Provider: No ref. provider found    Chief Complaint: Chest pain, shortness of breath    History of Present Illness:   Mrs. Latonya Mei is a 73 y.o. female who presents to the Cardiology Clinic for follow up of chest pain.  The patient has a past medical history of hypertension, hyperlipidemia, and type 2 diabetes mellitus.  She reports recent nonexertional/nonradiating, midsternal chest discomfort.  She describes the sensation as a \"throb\" or \"sharp\" pain, 5/10 on the pain scale.  No known frequency, but can last minutes.  He reports associated and diaphoresis. No known relieing or exaccerabating facotrs.  In addition, she notes dyspnea on exertion, but continues to perform ADL's independently.  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  Her  has COPD and requires ongoing care.  Her last ischemic evaluation was in 2019.  She offers no other complaints or concerns at this time.    Past Cardiac Testin. Last Coronary Angio: None  2. Prior Stress Testin2019   Normal Lexiscan   3. Last Echo: 2019  Estimated EF = 65%.  Left ventricular systolic function is normal.  Mild aortic valve regurgitation is present.  Calculated right ventricular systolic pressure from tricuspid regurgitation is 28 mmHg.  4. Prior Holter Monitor: 2019   Normal cardiac monitor.  No correlation between patient's symptoms and underlying arrhythmia and/or ectopy.  All bradycardia noted to be mild sinus bradycardia occurring during sleeping hours or asymptomatic.    Review of Systems:   Review of Systems  As Noted in HPI.   I have reviewed and confirmed the accuracy of the ROS as documented by the MA/JAMIE/RN Marilia Bal RN    I have reviewed and/or updated the patient's past medical, past " surgical, family, social history, problem list and allergies as appropriate.     Medications:     Current Outpatient Medications:     ACCU-CHEK FASTCLIX LANCETS misc, TEST 1-2 TIMES DAILY, Disp: 50 each, Rfl: 12    acetaminophen (TYLENOL) 500 MG tablet, Take 1 tablet by mouth As Needed for Mild Pain., Disp: , Rfl:     aspirin 81 MG chewable tablet, Chew 1 tablet Daily., Disp: , Rfl:     ezetimibe (ZETIA) 10 MG tablet, Take 1 tablet by mouth Daily., Disp: , Rfl:     glucose blood (ACCU-CHEK GUIDE) test strip, Test 1-2 Times daily, Disp: 50 each, Rfl: 12    levothyroxine (SYNTHROID, LEVOTHROID) 25 MCG tablet, Take 1 tablet by mouth Daily., Disp: 90 tablet, Rfl: 2    metoprolol succinate XL (TOPROL-XL) 50 MG 24 hr tablet, TAKE ONE TABLET BY MOUTH DAILY, Disp: 90 tablet, Rfl: 3    omeprazole (priLOSEC) 40 MG capsule, TAKE ONE CAPSULE BY MOUTH 30 MINUTES BEFORE BREAKFAST DAILY. Needs office visit for further refills. (Patient taking differently: 1 capsule Daily. TAKE ONE CAPSULE BY MOUTH 30 MINUTES BEFORE BREAKFAST DAILY. Needs office visit for further refills.), Disp: 30 capsule, Rfl: 0    promethazine (PHENERGAN) 12.5 MG tablet, Take 1 tablet by mouth Every 8 (Eight) Hours As Needed for Nausea or Vomiting., Disp: 30 tablet, Rfl: 1    rosuvastatin (CRESTOR) 40 MG tablet, Take 1 tablet by mouth Daily., Disp: , Rfl:     Trulicity 0.75 MG/0.5ML solution pen-injector, Inject  under the skin into the appropriate area as directed 1 (One) Time Per Week., Disp: , Rfl:     venlafaxine XR (EFFEXOR-XR) 75 MG 24 hr capsule, 1 capsule Daily., Disp: , Rfl:     vitamin B-12 (CYANOCOBALAMIN) 1000 MCG tablet, Take 1 tablet by mouth Daily., Disp: , Rfl:     VITAMIN D, CHOLECALCIFEROL, PO, Take 1,000 Units by mouth Daily., Disp: , Rfl:     Physical Exam:  Vital Signs:   Vitals:    12/20/23 1124   BP: 120/80   BP Location: Left arm   Patient Position: Sitting   Cuff Size: Adult   Pulse: 77   SpO2: 97%   Weight: 76.2 kg (168 lb)   Height:  "167.6 cm (66\")     Body mass index is 27.12 kg/m².    Physical Exam  Vitals and nursing note reviewed.   Constitutional:       General: She is not in acute distress.  HENT:      Head: Normocephalic and atraumatic.   Neck:      Trachea: Trachea normal.   Cardiovascular:      Rate and Rhythm: Normal rate and regular rhythm.      Pulses: Normal pulses.      Heart sounds: Normal heart sounds. No murmur heard.     No friction rub. No gallop.   Pulmonary:      Effort: Pulmonary effort is normal.      Breath sounds: Normal breath sounds.   Musculoskeletal:      Cervical back: Neck supple.      Right lower leg: No edema.      Left lower leg: No edema.   Skin:     General: Skin is warm and dry.   Neurological:      Mental Status: She is alert and oriented to person, place, and time.   Psychiatric:         Mood and Affect: Mood normal.         Behavior: Behavior normal. Behavior is cooperative.         Thought Content: Thought content does not include suicidal ideation.         Results Review:   I reviewed the patient's new clinical results.    Procedures    Assessment / Plan:   Diagnoses and all orders for this visit:    1. Chest pain, unspecified type (Primary)  Plan for vasodilator nuclear stress test to rule out ischemia  Plan for echocardiogram to assess heart structure and function  Multiple risk factors for CAD    2. IBARRA (dyspnea on exertion)  Proceed with nuclear stress test and echocardiogram as previously described    3. Essential hypertension  Acceptable blood pressure control    4. Dyslipidemia  No recent labs available for review  Patient is currently tolerating high intensity statin with Zetia  LDL goal <70 mg/dL; patient verbalized understanding of this    5. Type 2 diabetes mellitus without complication, without long-term current use of insulin  No recent labs available for review  Hemoglobin A1c goal <7%; patient verbalized understanding of this      Preventative Cardiology:   Tobacco Cessation: N/A   Advance " Care Planning: ACP discussion was declined by the patient. Patient does not have an advance directive, declines further assistance.     Follow Up:   Return in about 1 month (around 1/20/2024) for cecy.      Thank you for allowing me to participate in the care of your patient. Please do not hesitate to contact me with additional questions or concerns.     SHELLY Raman

## 2023-12-20 ENCOUNTER — OFFICE VISIT (OUTPATIENT)
Dept: CARDIOLOGY | Facility: CLINIC | Age: 73
End: 2023-12-20
Payer: MEDICARE

## 2023-12-20 VITALS
OXYGEN SATURATION: 97 % | HEART RATE: 77 BPM | WEIGHT: 168 LBS | HEIGHT: 66 IN | BODY MASS INDEX: 27 KG/M2 | DIASTOLIC BLOOD PRESSURE: 80 MMHG | SYSTOLIC BLOOD PRESSURE: 120 MMHG

## 2023-12-20 DIAGNOSIS — R06.09 DOE (DYSPNEA ON EXERTION): ICD-10-CM

## 2023-12-20 DIAGNOSIS — E78.5 DYSLIPIDEMIA: ICD-10-CM

## 2023-12-20 DIAGNOSIS — R07.9 CHEST PAIN, UNSPECIFIED TYPE: Primary | ICD-10-CM

## 2023-12-20 DIAGNOSIS — E11.9 TYPE 2 DIABETES MELLITUS WITHOUT COMPLICATION, WITHOUT LONG-TERM CURRENT USE OF INSULIN: ICD-10-CM

## 2023-12-20 DIAGNOSIS — I10 ESSENTIAL HYPERTENSION: ICD-10-CM

## 2023-12-20 PROCEDURE — 99214 OFFICE O/P EST MOD 30 MIN: CPT | Performed by: NURSE PRACTITIONER

## 2023-12-20 PROCEDURE — 3074F SYST BP LT 130 MM HG: CPT | Performed by: NURSE PRACTITIONER

## 2023-12-20 PROCEDURE — 1159F MED LIST DOCD IN RCRD: CPT | Performed by: NURSE PRACTITIONER

## 2023-12-20 PROCEDURE — 3079F DIAST BP 80-89 MM HG: CPT | Performed by: NURSE PRACTITIONER

## 2023-12-20 PROCEDURE — 1160F RVW MEDS BY RX/DR IN RCRD: CPT | Performed by: NURSE PRACTITIONER

## 2023-12-20 RX ORDER — SITAGLIPTIN 25 MG/1
TABLET, FILM COATED ORAL
COMMUNITY
Start: 2023-12-18

## 2023-12-29 ENCOUNTER — HOSPITAL ENCOUNTER (OUTPATIENT)
Dept: MAMMOGRAPHY | Facility: HOSPITAL | Age: 73
Discharge: HOME | End: 2023-12-29
Admitting: NURSE PRACTITIONER
Payer: MEDICARE

## 2023-12-29 DIAGNOSIS — Z12.31 VISIT FOR SCREENING MAMMOGRAM: ICD-10-CM

## 2023-12-29 PROCEDURE — 77067 SCR MAMMO BI INCL CAD: CPT

## 2023-12-29 PROCEDURE — 77063 BREAST TOMOSYNTHESIS BI: CPT

## 2024-02-13 ENCOUNTER — HOSPITAL ENCOUNTER (OUTPATIENT)
Dept: NUCLEAR MEDICINE | Facility: HOSPITAL | Age: 74
Discharge: HOME | End: 2024-02-13
Payer: MEDICARE

## 2024-02-13 DIAGNOSIS — R06.09 DOE (DYSPNEA ON EXERTION): ICD-10-CM

## 2024-02-13 DIAGNOSIS — R07.9 CHEST PAIN, UNSPECIFIED TYPE: ICD-10-CM

## 2024-02-13 PROCEDURE — 0 TECHNETIUM SESTAMIBI: Performed by: NURSE PRACTITIONER

## 2024-02-13 PROCEDURE — 25010000002 REGADENOSON 0.4 MG/5ML SOLUTION: Performed by: NURSE PRACTITIONER

## 2024-02-13 PROCEDURE — 93017 CV STRESS TEST TRACING ONLY: CPT

## 2024-02-13 PROCEDURE — A9500 TC99M SESTAMIBI: HCPCS | Performed by: NURSE PRACTITIONER

## 2024-02-13 PROCEDURE — 78452 HT MUSCLE IMAGE SPECT MULT: CPT

## 2024-02-13 RX ORDER — REGADENOSON 0.08 MG/ML
0.4 INJECTION, SOLUTION INTRAVENOUS
Status: COMPLETED | OUTPATIENT
Start: 2024-02-13 | End: 2024-02-13

## 2024-02-13 RX ADMIN — TECHNETIUM TC 99M SESTAMIBI 1 DOSE: 1 INJECTION INTRAVENOUS at 08:29

## 2024-02-13 RX ADMIN — REGADENOSON 0.4 MG: 0.08 INJECTION, SOLUTION INTRAVENOUS at 08:29

## 2024-02-13 NOTE — PROGRESS NOTES
[facility] Cardiology Office Follow Up Note    Latonya Mei  [id number]  2024    Primary Care Provider: Chrystal Buckley APRN   Referring Provider: No ref. provider found    Chief Complaint: Follow-up after cardiac testing    History of Present Illness:   Mrs. Latonya Mei is a 73 y.o. female who presents to the Cardiology Clinic for follow of chest pain and dyspnea.  The patient has a past medical history of hypertension, hyperlipidemia, and type 2 diabetes mellitus.  The patient previously reported chest discomfort as well as dyspnea on exertion.  A subsequent MPS showed no evidence of ischemia.  An echocardiogram showed normal ejection fraction with no significant valve disease.  She presents today for follow-up.  The patient reports ongoing shortness of breath but denies chest discomfort.  She continues to care for her sister who is blind and her  who has COPD.  Although the patient has no personal history of smoking, she does report exposure to secondhand smoke as well as chemicals/dust particles when she worked [employer] (many years ago).  She continues to deny lower extremity edema.  She offers no other complaints or concerns at this time.    Past Cardiac Testin. Last Coronary Angio: None  2. Prior Stress Testin2024    Myocardial perfusion imaging indicates a normal myocardial perfusion study with no evidence of ischemia. Impressions are consistent with a low risk study.    Left ventricular ejection fraction is hyperdynamic (Calculated EF > 70%).    Findings consistent with a normal ECG stress test.  3. Last Echo: 2024    Left ventricular systolic function is normal. Estimated left ventricular EF = 66% Left ventricular ejection fraction appears to be 66 - 70%.    Left ventricular diastolic function is consistent with age.    Estimated right ventricular systolic pressure from tricuspid regurgitation is normal (<35 mmHg).  4. Prior Holter Monitor:  5/28/2019   Normal cardiac monitor.  No correlation between patient's symptoms and underlying arrhythmia and/or ectopy.  All bradycardia noted to be mild sinus bradycardia occurring during sleeping hours or asymptomatic.    Review of Systems:   Review of Systems  As Noted in HPI.   I have reviewed and confirmed the accuracy of the ROS as documented by the MA/LPN/RN SHELLY Gage    I have reviewed and/or updated the patient's past medical, past surgical, family, social history, problem list and allergies as appropriate.     Medications:     Current Outpatient Medications:     ACCU-CHEK FASTCLIX LANCETS misc, TEST 1-2 TIMES DAILY, Disp: 50 each, Rfl: 12    acetaminophen (TYLENOL) 500 MG tablet, Take 1 tablet by mouth As Needed for Mild Pain., Disp: , Rfl:     aspirin 81 MG chewable tablet, Chew 1 tablet Daily., Disp: , Rfl:     bisacodyl (DULCOLAX) 5 MG EC tablet, Take as directed for colon prep, Disp: 4 tablet, Rfl: 0    ezetimibe (ZETIA) 10 MG tablet, Take 1 tablet by mouth Daily., Disp: , Rfl:     glucose blood (ACCU-CHEK GUIDE) test strip, Test 1-2 Times daily, Disp: 50 each, Rfl: 12    Januvia 25 MG tablet, , Disp: , Rfl:     levothyroxine (SYNTHROID, LEVOTHROID) 25 MCG tablet, Take 1 tablet by mouth Daily., Disp: 90 tablet, Rfl: 2    metoprolol succinate XL (TOPROL-XL) 50 MG 24 hr tablet, TAKE ONE TABLET BY MOUTH DAILY, Disp: 90 tablet, Rfl: 3    omeprazole (priLOSEC) 40 MG capsule, TAKE ONE CAPSULE BY MOUTH 30 MINUTES BEFORE BREAKFAST DAILY. Needs office visit for further refills. (Patient taking differently: 1 capsule Daily. TAKE ONE CAPSULE BY MOUTH 30 MINUTES BEFORE BREAKFAST DAILY. Needs office visit for further refills.), Disp: 30 capsule, Rfl: 0    PEG-KCl-NaCl-NaSulf-Na Asc-C (MOVIPREP) 100 g reconstituted solution powder, Take 1,000 mL by mouth Take As Directed. Take as directed for colonoscopy prep., Disp: 1 each, Rfl: 0    rosuvastatin (CRESTOR) 40 MG tablet, Take 1 tablet by mouth  "Daily., Disp: , Rfl:     venlafaxine XR (EFFEXOR-XR) 75 MG 24 hr capsule, 1 capsule Daily., Disp: , Rfl:     vitamin B-12 (CYANOCOBALAMIN) 1000 MCG tablet, Take 1 tablet by mouth Daily., Disp: , Rfl:     VITAMIN D, CHOLECALCIFEROL, PO, Take 1,000 Units by mouth Daily., Disp: , Rfl:     Physical Exam:  Vital Signs:   Vitals:    02/21/24 1132   BP: 118/62   BP Location: Left arm   Patient Position: Sitting   Cuff Size: Adult   Pulse: 64   SpO2: 97%   Weight: 75.3 kg (166 lb)   Height: 167.6 cm (65.98\")     Body mass index is 26.81 kg/m².    Physical Exam  Vitals and nursing note reviewed.   Constitutional:       General: She is not in acute distress.  HENT:      Head: Normocephalic and atraumatic.   Neck:      Trachea: Trachea normal.   Cardiovascular:      Rate and Rhythm: Normal rate and regular rhythm.      Pulses: Normal pulses.      Heart sounds: Normal heart sounds. No murmur heard.     No friction rub. No gallop.   Pulmonary:      Effort: Pulmonary effort is normal.      Breath sounds: Normal breath sounds.   Musculoskeletal:      Cervical back: Neck supple.      Right lower leg: No edema.      Left lower leg: No edema.   Skin:     General: Skin is warm and dry.   Neurological:      Mental Status: She is alert and oriented to person, place, and time.   Psychiatric:         Mood and Affect: Mood normal.         Behavior: Behavior normal. Behavior is cooperative.         Thought Content: Thought content does not include suicidal ideation.         Results Review:   I reviewed the patient's new clinical results.    Procedures    Assessment / Plan:     1. Chest pain, unspecified type (Primary)  Normal MPS and echocardiogram    2. IBARRA (dyspnea on exertion)  Patient declines referral to pulmonologist at this time, but she knows this option remains available to her     3. Essential hypertension  Acceptable blood pressure control     4. Dyslipidemia  No recent labs available for review  Patient is currently tolerating " high intensity statin with Zetia  LDL goal <70 mg/dL; patient verbalized understanding of this  PATIENT DECLINES LIPID PANEL FOR SURVEILLANCE      Preventative Cardiology:   Tobacco Cessation: N/A   Advance Care Planning: ACP discussion was declined by the patient. Patient does not have an advance directive, declines further assistance.     Follow Up:   Return in about 1 year (around 2/21/2025) for Follow-up with Dr. Ríos.      Thank you for allowing me to participate in the care of your patient. Please do not hesitate to contact me with additional questions or concerns.     Odilia Javier, APRN

## 2024-02-14 ENCOUNTER — OFFICE VISIT (OUTPATIENT)
Dept: GASTROENTEROLOGY | Facility: CLINIC | Age: 74
End: 2024-02-14
Payer: MEDICARE

## 2024-02-14 ENCOUNTER — HOSPITAL ENCOUNTER (OUTPATIENT)
Dept: NUCLEAR MEDICINE | Facility: HOSPITAL | Age: 74
Discharge: HOME | End: 2024-02-14
Payer: MEDICARE

## 2024-02-14 VITALS
RESPIRATION RATE: 14 BRPM | OXYGEN SATURATION: 99 % | HEART RATE: 89 BPM | HEIGHT: 66 IN | BODY MASS INDEX: 27.16 KG/M2 | WEIGHT: 169 LBS | DIASTOLIC BLOOD PRESSURE: 72 MMHG | SYSTOLIC BLOOD PRESSURE: 124 MMHG

## 2024-02-14 DIAGNOSIS — R19.5 POSITIVE FECAL OCCULT BLOOD TEST: Chronic | ICD-10-CM

## 2024-02-14 DIAGNOSIS — E66.3 OVERWEIGHT WITH BODY MASS INDEX (BMI) OF 27 TO 27.9 IN ADULT: Chronic | ICD-10-CM

## 2024-02-14 DIAGNOSIS — R93.3 ABNORMAL CT SCAN, COLON: ICD-10-CM

## 2024-02-14 DIAGNOSIS — Z12.11 ENCOUNTER FOR SCREENING FOR MALIGNANT NEOPLASM OF COLON: Chronic | ICD-10-CM

## 2024-02-14 DIAGNOSIS — K21.00 GASTROESOPHAGEAL REFLUX DISEASE WITH ESOPHAGITIS WITHOUT HEMORRHAGE: Primary | Chronic | ICD-10-CM

## 2024-02-14 DIAGNOSIS — Z87.19 HISTORY OF FATTY INFILTRATION OF LIVER: Chronic | ICD-10-CM

## 2024-02-14 LAB
BH CV REST NUCLEAR ISOTOPE DOSE: 24.5 MCI
BH CV STRESS COMMENTS STAGE 1: NORMAL
BH CV STRESS DOSE REGADENOSON STAGE 1: 0.4
BH CV STRESS DURATION MIN STAGE 1: 0
BH CV STRESS DURATION SEC STAGE 1: 10
BH CV STRESS NUCLEAR ISOTOPE DOSE: 33 MCI
BH CV STRESS PROTOCOL 1: NORMAL
BH CV STRESS RECOVERY BP: NORMAL MMHG
BH CV STRESS RECOVERY HR: 88 BPM
BH CV STRESS STAGE 1: 1
LV EF NUC BP: 71 %
MAXIMAL PREDICTED HEART RATE: 145 BPM
PERCENT MAX PREDICTED HR: 73.1 %
STRESS BASELINE BP: NORMAL MMHG
STRESS BASELINE HR: 63 BPM
STRESS PERCENT HR: 86 %
STRESS POST PEAK BP: NORMAL MMHG
STRESS POST PEAK HR: 106 BPM
STRESS TARGET HR: 123 BPM

## 2024-02-14 PROCEDURE — A9500 TC99M SESTAMIBI: HCPCS | Performed by: NURSE PRACTITIONER

## 2024-02-14 PROCEDURE — 0 TECHNETIUM SESTAMIBI: Performed by: NURSE PRACTITIONER

## 2024-02-14 RX ORDER — PEG-3350, SODIUM SULFATE, SODIUM CHLORIDE, POTASSIUM CHLORIDE, SODIUM ASCORBATE AND ASCORBIC ACID 7.5-2.691G
1000 KIT ORAL TAKE AS DIRECTED
Qty: 1 EACH | Refills: 0 | Status: SHIPPED | OUTPATIENT
Start: 2024-02-14

## 2024-02-14 RX ORDER — SODIUM CHLORIDE 9 MG/ML
70 INJECTION, SOLUTION INTRAVENOUS CONTINUOUS PRN
OUTPATIENT
Start: 2024-02-14

## 2024-02-14 RX ORDER — BISACODYL 5 MG/1
TABLET, DELAYED RELEASE ORAL
Qty: 4 TABLET | Refills: 0 | Status: SHIPPED | OUTPATIENT
Start: 2024-02-14

## 2024-02-14 RX ADMIN — TECHNETIUM TC 99M SESTAMIBI 1 DOSE: 1 INJECTION INTRAVENOUS at 06:10

## 2024-02-16 PROBLEM — Z12.11 ENCOUNTER FOR SCREENING FOR MALIGNANT NEOPLASM OF COLON: Status: ACTIVE | Noted: 2024-02-14

## 2024-02-21 ENCOUNTER — OFFICE VISIT (OUTPATIENT)
Dept: CARDIOLOGY | Facility: CLINIC | Age: 74
End: 2024-02-21
Payer: MEDICARE

## 2024-02-21 VITALS
SYSTOLIC BLOOD PRESSURE: 118 MMHG | HEART RATE: 64 BPM | DIASTOLIC BLOOD PRESSURE: 62 MMHG | WEIGHT: 166 LBS | OXYGEN SATURATION: 97 % | HEIGHT: 66 IN | BODY MASS INDEX: 26.68 KG/M2

## 2024-02-21 DIAGNOSIS — R06.09 DOE (DYSPNEA ON EXERTION): Primary | ICD-10-CM

## 2024-02-21 DIAGNOSIS — E78.5 DYSLIPIDEMIA: ICD-10-CM

## 2024-02-21 DIAGNOSIS — I10 ESSENTIAL HYPERTENSION: ICD-10-CM

## 2024-02-21 PROBLEM — R94.31 ABNORMAL ECG: Status: RESOLVED | Noted: 2019-04-30 | Resolved: 2024-02-21

## 2024-02-21 PROBLEM — R60.0 BILATERAL LEG EDEMA: Status: RESOLVED | Noted: 2019-06-11 | Resolved: 2024-02-21

## 2024-02-21 PROBLEM — E78.00 PURE HYPERCHOLESTEROLEMIA: Status: RESOLVED | Noted: 2019-04-30 | Resolved: 2024-02-21

## 2024-02-21 PROBLEM — E66.3 OVERWEIGHT WITH BODY MASS INDEX (BMI) OF 27 TO 27.9 IN ADULT: Status: RESOLVED | Noted: 2024-02-14 | Resolved: 2024-02-21

## 2024-02-21 PROBLEM — R07.2 PRECORDIAL PAIN: Status: RESOLVED | Noted: 2019-04-30 | Resolved: 2024-02-21

## 2024-02-21 PROCEDURE — 99214 OFFICE O/P EST MOD 30 MIN: CPT | Performed by: NURSE PRACTITIONER

## 2024-02-21 PROCEDURE — 1159F MED LIST DOCD IN RCRD: CPT | Performed by: NURSE PRACTITIONER

## 2024-02-21 PROCEDURE — 1160F RVW MEDS BY RX/DR IN RCRD: CPT | Performed by: NURSE PRACTITIONER

## 2024-02-21 PROCEDURE — 3078F DIAST BP <80 MM HG: CPT | Performed by: NURSE PRACTITIONER

## 2024-02-21 PROCEDURE — 3074F SYST BP LT 130 MM HG: CPT | Performed by: NURSE PRACTITIONER

## 2024-03-15 ENCOUNTER — TELEPHONE (OUTPATIENT)
Dept: GASTROENTEROLOGY | Facility: CLINIC | Age: 74
End: 2024-03-15
Payer: MEDICARE

## 2024-03-15 NOTE — TELEPHONE ENCOUNTER
"Hub staff attempted to follow warm transfer process and was unsuccessful     Caller: Latonya Mei    Relationship to patient: Self    Best call back number: [phone]    Patient is needing: PATIENT CALLED IN AND STATED THAT SHE IS SUPPOSED TO START PREP FOR HER UPCOMING PROCEDURE SCHEDULED FOR 03/22/2024, BUT HER \"BELLY BUTTON IS RED AND HAS A DISCHARGE COMING OUT OF IT, AND WHEN SHE CLEANS IT WITH A Q-TIP, IT LOOKS LIKE LIGHT BLOOD ON THE Q-TIP\". PATIENT WOULD LIKE TO BE ADVISED IF SHE SHOULD PROCEED WITH PROCEDURE. PLEASE CALL BACK ANYTIME, OKAY TO LEAVE VM.      "

## 2024-03-18 ENCOUNTER — TELEPHONE (OUTPATIENT)
Dept: GASTROENTEROLOGY | Facility: CLINIC | Age: 74
End: 2024-03-18
Payer: MEDICARE

## 2024-03-18 NOTE — TELEPHONE ENCOUNTER
Patient is scheduled for colonoscopy with Dr. Andre on 3/22/24 with arrival time of 730 am. Called patient to confirm appointment. Patient has confirmed

## 2024-03-20 NOTE — PRE-PROCEDURE INSTRUCTIONS
PAT phone history completed with patient for upcoming procedure on 3/22/24 with Dr. Andre.    PAT PASS reviewed with patient and they verbalize understanding of the following:     Do not eat or drink anything after midnight the night before procedure unless otherwise instructed by physician/surgeon's office, this includes no gum, candy, mints, tobacco products or e-cigarettes.  Do not shave the area to be operated on at least 48 hours prior to procedure.  Do not wear makeup, lotion, hair products, or nail polish.  Do not wear any jewelry and remove all piercings.  Do not wear any adhesive if you wear dentures.  Do not wear contacts; bring in glasses if needed.  Only take medications on the morning of procedure as instructed by PAT nurse per anesthesia guidelines or as instructed by physician's office.  If you are on any blood thinners reach out to the physician/surgeon's office for instructions on when/if they will need to be stopped prior to procedure.  Bring in picture ID and insurance card, advanced directive copies if applicable, CPAP/BIPAP/Inhalers if indicated morning of procedure, leave any other valuables at home.  Ensure you have arranged for someone to drive you home the day of your procedure and someone to care for you at home afterwards. It is recommended that you do not drive, drink alcohol, or make any major legal decisions for at least 24 hours after your procedure is complete.    Instructions given on hospital entrance and registration location.

## 2024-03-21 ENCOUNTER — TELEPHONE (OUTPATIENT)
Dept: GASTROENTEROLOGY | Facility: CLINIC | Age: 74
End: 2024-03-21
Payer: MEDICARE

## 2024-03-21 NOTE — TELEPHONE ENCOUNTER
----- Message from SHELLY Patterson sent at 3/21/2024 11:59 AM EDT -----  As long as she feels ok and is not running a fever, this does not interfere with the colonoscopy.  If she is having signs of an infection, she needs to go to her PCP or the [facility] to get checked out.  ----- Message -----  From: Vika Prado MA  Sent: 3/21/2024  10:46 AM EDT  To: SHELLY Patterson      ----- Message -----  From: Carla Morin MA  Sent: 3/21/2024   7:58 AM EDT  To: Vika Prado MA      ----- Message -----  From: Raysa Abbott RN  Sent: 3/20/2024   6:28 PM EDT  To: [facility]    Patient is having a colonoscopy on 3/22/24 with Thai. She said at the beginning of the week she had redness in her umbilicus and drainage coming from the site as well. She said she has been cleaning it with qtips and using antiseptic on it but it has gotten better, no longer draining and redness improved. She wanted to make sure Thai was aware before her colonoscopy and that she was okay to proceed. If someone would like to reach out to her or let me know and I'll reach out to her.    Thank you!

## 2024-03-21 NOTE — TELEPHONE ENCOUNTER
Called patient, information given to the patient.  She states understanding.  The patient states she is no longer having any drainage, after cleaning the belly button and putting some ointment there.

## 2024-03-22 ENCOUNTER — HOSPITAL ENCOUNTER (OUTPATIENT)
Facility: HOSPITAL | Age: 74
Setting detail: HOSPITAL OUTPATIENT SURGERY
Discharge: HOME | End: 2024-03-22
Attending: INTERNAL MEDICINE | Admitting: INTERNAL MEDICINE
Payer: MEDICARE

## 2024-03-22 ENCOUNTER — ANESTHESIA EVENT (OUTPATIENT)
Dept: GASTROENTEROLOGY | Facility: HOSPITAL | Age: 74
End: 2024-03-22
Payer: MEDICARE

## 2024-03-22 ENCOUNTER — ANESTHESIA (OUTPATIENT)
Dept: GASTROENTEROLOGY | Facility: HOSPITAL | Age: 74
End: 2024-03-22
Payer: MEDICARE

## 2024-03-22 VITALS
HEART RATE: 57 BPM | DIASTOLIC BLOOD PRESSURE: 66 MMHG | TEMPERATURE: 97.2 F | OXYGEN SATURATION: 94 % | SYSTOLIC BLOOD PRESSURE: 137 MMHG | RESPIRATION RATE: 18 BRPM

## 2024-03-22 DIAGNOSIS — Z12.11 ENCOUNTER FOR SCREENING FOR MALIGNANT NEOPLASM OF COLON: ICD-10-CM

## 2024-03-22 LAB — GLUCOSE BLDC GLUCOMTR-MCNC: 153 MG/DL (ref 70–130)

## 2024-03-22 PROCEDURE — 45380 COLONOSCOPY AND BIOPSY: CPT | Performed by: INTERNAL MEDICINE

## 2024-03-22 PROCEDURE — 25810000003 SODIUM CHLORIDE 0.9 % SOLUTION: Performed by: NURSE ANESTHETIST, CERTIFIED REGISTERED

## 2024-03-22 PROCEDURE — 25010000002 PROPOFOL 10 MG/ML EMULSION: Performed by: NURSE ANESTHETIST, CERTIFIED REGISTERED

## 2024-03-22 PROCEDURE — 82948 REAGENT STRIP/BLOOD GLUCOSE: CPT

## 2024-03-22 PROCEDURE — 25810000003 SODIUM CHLORIDE 0.9 % SOLUTION: Performed by: NURSE PRACTITIONER

## 2024-03-22 RX ORDER — MAGNESIUM HYDROXIDE 1200 MG/15ML
LIQUID ORAL AS NEEDED
Status: DISCONTINUED | OUTPATIENT
Start: 2024-03-22 | End: 2024-03-22 | Stop reason: HOSPADM

## 2024-03-22 RX ORDER — SODIUM CHLORIDE 9 MG/ML
70 INJECTION, SOLUTION INTRAVENOUS CONTINUOUS PRN
Status: DISCONTINUED | OUTPATIENT
Start: 2024-03-22 | End: 2024-03-22 | Stop reason: HOSPADM

## 2024-03-22 RX ORDER — SODIUM CHLORIDE 9 MG/ML
INJECTION, SOLUTION INTRAVENOUS CONTINUOUS PRN
Status: DISCONTINUED | OUTPATIENT
Start: 2024-03-22 | End: 2024-03-22 | Stop reason: SURG

## 2024-03-22 RX ORDER — SIMETHICONE 40MG/0.6ML
SUSPENSION, DROPS(FINAL DOSAGE FORM)(ML) ORAL AS NEEDED
Status: DISCONTINUED | OUTPATIENT
Start: 2024-03-22 | End: 2024-03-22 | Stop reason: HOSPADM

## 2024-03-22 RX ORDER — PROPOFOL 10 MG/ML
VIAL (ML) INTRAVENOUS CONTINUOUS PRN
Status: DISCONTINUED | OUTPATIENT
Start: 2024-03-22 | End: 2024-03-22 | Stop reason: SURG

## 2024-03-22 RX ADMIN — PROPOFOL 200 MCG/KG/MIN: 10 INJECTION, EMULSION INTRAVENOUS at 08:49

## 2024-03-22 RX ADMIN — LIDOCAINE HYDROCHLORIDE 60 MG: 20 INJECTION, SOLUTION INTRAVENOUS at 08:49

## 2024-03-22 RX ADMIN — SODIUM CHLORIDE: 9 INJECTION, SOLUTION INTRAVENOUS at 08:39

## 2024-03-22 RX ADMIN — SODIUM CHLORIDE 70 ML/HR: 9 INJECTION, SOLUTION INTRAVENOUS at 07:51

## 2024-03-22 NOTE — DISCHARGE INSTRUCTIONS
- Discharge patient to home (ambulatory).   - High fiber diet.   - Continue present medications.   - Await pathology results.   - Repeat colonoscopy in 10 years for screening purposes.   - Return to GI office in 8 weeks.     No pushing, pulling, tugging,  heavy lifting, or strenuous activity.  No major decision making, driving, or drinking alcoholic beverages for 24 hours. ( due to the medications you have  received)  Always use good hand hygiene/washing techniques.  NO driving while taking pain medications.    * if you have an incision:  Check your incision area every day for signs of infection.   Check for:  * more redness, swelling, or pain  *more fluid or blood  *warmth  *pus or bad smellRest today  No pushing,pulling,tugging,heavy lifting, or strenuous activity   No major decision making,driving,or drinking alcoholic beverages for 24 hours due to the sedation you received  Always use good hand hygiene/washing technique  No driving on pain medication.To assist you in voiding:  Drink plenty of fluids  Listen to running water while attempting to void.    If you are unable to urinate and you have an uncomfortable urge to void or it has been   6 hours since you were discharged, return to the Emergency Room
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
## 2024-03-22 NOTE — ANESTHESIA POSTPROCEDURE EVALUATION
Patient: Latonya Mei    Procedure Summary       Date: 03/22/24 Room / Location: [facility] ENDOSCOPY 2 / [facility] ENDOSCOPY    Anesthesia Start: 0839 Anesthesia Stop: 0909    Procedure: COLONOSCOPY WITH BIOPSY (Anus) Diagnosis:       Encounter for screening for malignant neoplasm of colon      (Encounter for screening for malignant neoplasm of colon [Z12.11])    Surgeons: Kena Andre MD Provider: Mark Anthony Franks CRNA    Anesthesia Type: MAC ASA Status: 3            Anesthesia Type: MAC    Vitals  No vitals data found for the desired time range.          Post Anesthesia Care and Evaluation    Patient location during evaluation: PHASE II  Patient participation: complete - patient participated  Level of consciousness: awake and alert  Pain score: 0  Pain management: satisfactory to patient    Airway patency: patent  Anesthetic complications: No anesthetic complications  PONV Status: none  Cardiovascular status: acceptable and stable  Respiratory status: acceptable and spontaneous ventilation  Hydration status: acceptable    Comments: Vitals signs as noted in nursing documentation as per protocol.

## 2024-03-22 NOTE — ANESTHESIA PREPROCEDURE EVALUATION
" Anesthesia Evaluation     Patient summary reviewed and Nursing notes reviewed   no history of anesthetic complications:   NPO Solid Status: > 8 hours  NPO Liquid Status: > 6 hours           Airway   Mallampati: II  TM distance: >3 FB  Neck ROM: full  no difficulty expected  Dental    (+) upper dentures and lower dentures    Pulmonary - normal exam    breath sounds clear to auscultation  (+) ,shortness of breath, sleep apnea  (-) not a smoker  Cardiovascular - normal exam  Exercise tolerance: good (4-7 METS)    ECG reviewed  PT is on anticoagulation therapy  Patient on routine beta blocker  Rhythm: regular  Rate: normal    (+) hypertension, valvular problems/murmurs (\"leaky valve\") AI, IBARRA, hyperlipidemia    ROS comment: 2/14/24 stress test w/ perfusion study-  ·  Myocardial perfusion imaging indicates a normal myocardial perfusion study with no evidence of ischemia. Impressions are consistent with a low risk study.  ·  Left ventricular ejection fraction is hyperdynamic (Calculated EF > 70%).  ·  Findings consistent with a normal ECG stress test.      1/10/24 echo-  ·  Left ventricular systolic function is normal. Estimated left ventricular EF = 66% Left ventricular ejection fraction appears to be 66 - 70%.  ·  Left ventricular diastolic function is consistent with age.  ·  Estimated right ventricular systolic pressure from tricuspid regurgitation is normal (<35 mmHg).         Neuro/Psych  (+) headaches, dizziness/light headedness, psychiatric history Depression and Anxiety  GI/Hepatic/Renal/Endo    (+) GERD well controlled, PUD (Hx ulcers), GI bleeding , diabetes mellitus ([id number]) type 2, thyroid problem hypothyroidism    Musculoskeletal     (+) arthralgias (Fibromyalgia), back pain, chronic pain, myalgias (Fibromyalgia )  Abdominal    Substance History - negative use     OB/GYN negative ob/gyn ROS         Other   arthritis,                       Anesthesia Plan    ASA 3     MAC     (Risks and benefits of general " anesthesia discussed including risk of aspiration, recall and dental damage. All patient questions answered.    Will continue with plan of care.)  intravenous induction     Anesthetic plan, risks, benefits, and alternatives have been provided, discussed and informed consent has been obtained with: patient.  Pre-procedure education provided  Plan discussed with CRNA.

## 2024-03-25 LAB — REF LAB TEST METHOD: NORMAL

## 2024-05-06 RX ORDER — PROMETHAZINE HYDROCHLORIDE 12.5 MG/1
12.5 TABLET ORAL EVERY 8 HOURS PRN
Qty: 30 TABLET | Refills: 0 | Status: SHIPPED | OUTPATIENT
Start: 2024-05-06

## 2024-05-17 DIAGNOSIS — E11.9 TYPE 2 DIABETES MELLITUS WITHOUT COMPLICATION, WITHOUT LONG-TERM CURRENT USE OF INSULIN: ICD-10-CM

## 2024-05-17 DIAGNOSIS — I35.1 NONRHEUMATIC AORTIC VALVE INSUFFICIENCY: ICD-10-CM

## 2024-05-17 DIAGNOSIS — R94.31 ABNORMAL ECG: ICD-10-CM

## 2024-05-17 DIAGNOSIS — R00.2 PALPITATIONS: ICD-10-CM

## 2024-05-17 DIAGNOSIS — E78.00 PURE HYPERCHOLESTEROLEMIA: ICD-10-CM

## 2024-05-17 RX ORDER — METOPROLOL SUCCINATE 50 MG/1
50 TABLET, EXTENDED RELEASE ORAL DAILY
Qty: 90 TABLET | Refills: 3 | Status: SHIPPED | OUTPATIENT
Start: 2024-05-17

## 2024-06-05 ENCOUNTER — TRANSCRIBE ORDERS (OUTPATIENT)
Dept: ADMINISTRATIVE | Facility: HOSPITAL | Age: 74
End: 2024-06-05
Payer: MEDICARE

## 2024-06-05 DIAGNOSIS — R41.3 OTHER AMNESIA: ICD-10-CM

## 2024-06-05 DIAGNOSIS — G43.019 HEADACHE, COMMON MIGRAINE, INTRACTABLE: Primary | ICD-10-CM

## 2024-06-05 DIAGNOSIS — R42 DIZZINESS AND GIDDINESS: ICD-10-CM

## 2024-06-07 ENCOUNTER — TELEPHONE (OUTPATIENT)
Dept: CARDIOLOGY | Facility: CLINIC | Age: 74
End: 2024-06-07
Payer: MEDICARE

## 2024-06-07 RX ORDER — METOPROLOL SUCCINATE 100 MG/1
100 TABLET, EXTENDED RELEASE ORAL DAILY
Qty: 90 TABLET | Refills: 3 | Status: SHIPPED | OUTPATIENT
Start: 2024-06-07

## 2024-06-07 RX ORDER — POTASSIUM CHLORIDE 20 MEQ/1
20 TABLET, EXTENDED RELEASE ORAL DAILY
Qty: 90 TABLET | Refills: 3 | Status: SHIPPED | OUTPATIENT
Start: 2024-06-07

## 2024-06-07 RX ORDER — FUROSEMIDE 40 MG/1
40 TABLET ORAL DAILY
Qty: 90 TABLET | Refills: 3 | Status: SHIPPED | OUTPATIENT
Start: 2024-06-07

## 2024-06-07 NOTE — TELEPHONE ENCOUNTER
Pt called stating that she is having SOA and Dizziness when bending over and when she goes from sitting to standing. Pt states her BP the last time she took it was 194/55 in her left arm and 84/62 in her right arm last Saturday. Her HR was 107-115. Pt states that she has been losing weight but has had some swelling in her legs. Pt states she has had some shooting pains in her chest but thought it could be anxiety or Indigestion.

## 2024-06-10 ENCOUNTER — OFFICE VISIT (OUTPATIENT)
Dept: GASTROENTEROLOGY | Facility: CLINIC | Age: 74
End: 2024-06-10
Payer: MEDICARE

## 2024-06-10 VITALS
BODY MASS INDEX: 25.71 KG/M2 | WEIGHT: 160 LBS | DIASTOLIC BLOOD PRESSURE: 62 MMHG | HEART RATE: 81 BPM | RESPIRATION RATE: 18 BRPM | SYSTOLIC BLOOD PRESSURE: 100 MMHG | OXYGEN SATURATION: 97 % | HEIGHT: 66 IN

## 2024-06-10 DIAGNOSIS — Z87.19 HISTORY OF FATTY INFILTRATION OF LIVER: Chronic | ICD-10-CM

## 2024-06-10 DIAGNOSIS — Z12.11 ENCOUNTER FOR SCREENING FOR MALIGNANT NEOPLASM OF COLON: ICD-10-CM

## 2024-06-10 DIAGNOSIS — K21.00 GASTROESOPHAGEAL REFLUX DISEASE WITH ESOPHAGITIS WITHOUT HEMORRHAGE: Primary | Chronic | ICD-10-CM

## 2024-06-10 DIAGNOSIS — E66.3 OVERWEIGHT WITH BODY MASS INDEX (BMI) OF 25 TO 25.9 IN ADULT: Chronic | ICD-10-CM

## 2024-06-10 PROBLEM — K92.1 MELENA: Status: RESOLVED | Noted: 2019-06-06 | Resolved: 2024-06-10

## 2024-06-10 PROBLEM — R93.3 ABNORMAL CT SCAN, COLON: Status: RESOLVED | Noted: 2024-02-14 | Resolved: 2024-06-10

## 2024-06-10 PROBLEM — K52.9 COLITIS: Status: RESOLVED | Noted: 2023-08-31 | Resolved: 2024-06-10

## 2024-06-10 PROBLEM — R93.3 ABNORMAL CT SCAN, STOMACH: Status: RESOLVED | Noted: 2023-04-14 | Resolved: 2024-06-10

## 2024-06-10 PROBLEM — R19.5 POSITIVE FECAL OCCULT BLOOD TEST: Status: RESOLVED | Noted: 2024-02-14 | Resolved: 2024-06-10

## 2024-06-10 PROCEDURE — 3074F SYST BP LT 130 MM HG: CPT | Performed by: NURSE PRACTITIONER

## 2024-06-10 PROCEDURE — 1160F RVW MEDS BY RX/DR IN RCRD: CPT | Performed by: NURSE PRACTITIONER

## 2024-06-10 PROCEDURE — 1159F MED LIST DOCD IN RCRD: CPT | Performed by: NURSE PRACTITIONER

## 2024-06-10 PROCEDURE — 3078F DIAST BP <80 MM HG: CPT | Performed by: NURSE PRACTITIONER

## 2024-06-10 PROCEDURE — 99214 OFFICE O/P EST MOD 30 MIN: CPT | Performed by: NURSE PRACTITIONER

## 2024-06-10 RX ORDER — DULAGLUTIDE 0.75 MG/.5ML
1 INJECTION, SOLUTION SUBCUTANEOUS WEEKLY
COMMUNITY
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
## 2024-06-10 NOTE — PATIENT INSTRUCTIONS
Antireflux measures: Avoid fried, fatty foods, alcohol, chocolate, coffee, tea,  soft drinks, peppermint and spearmint, spicy foods, tomatoes and tomato based foods, onion based foods, and smoking.  Other antireflux measures include weight reduction if overweight, avoiding tight clothing around the abdomen, elevating the head of the bed 6 inches with blocks under the head board, and don't drink or eat before going to bed and avoid lying down immediately after meals.  Omeprazole 40 mg 1 po daily in the am 30 minutes before breakfast.  Recommended to take Levothyroxine first in the am upon waking, wait 30 minutes, then take Omeprazole 40 mg, wait 30 minutes, then eat breakfast and take other medications.   The patient should eat 4-5 very small meals throughout the day. Avoid large meals.  It is recommended to eat a softer diet. Meats are best consumed ground. Fruits and vegetables are best consumed cooked or steamed and then mashed.    Low fiber, low fat diet with liberal water intake. May use soluble fiber.   Low FODMAP diet - Avoid all dairy. May use lactose free/dairy free alternatives such as almond milk, rice milk, oat milk, etc.  Metamucil 1 packet/scoop daily or fiber gummies 2-4 per day.  Advised to exercise/increase activity 4-5 days per week.   Advised to maintain ideal body weight.  Colonoscopy for screening in 10 years, 2034.  Follow up: 6 months        Low-FODMAP Eating Plan  FODMAP stands for fermentable oligosaccharides, disaccharides, monosaccharides, and polyols. These are sugars that are hard for some people to digest. A low-FODMAP eating plan may help some people who have irritable bowel syndrome (IBS) and certain other bowel (intestinal) diseases to manage their symptoms.  This meal plan can be complicated to follow. Work with a diet and nutrition specialist (dietitian) to make a low-FODMAP eating plan that is right for you. A dietitian can help make sure that you get enough nutrition from this  diet.  What are tips for following this plan?  Reading food labels  Check labels for hidden FODMAPs such as:  High-fructose syrup.  Honey.  Agave.  Natural fruit flavors.  Onion or garlic powder.  Choose low-FODMAP foods that contain 3-4 grams of fiber per serving.  Check food labels for serving sizes. Eat only one serving at a time to make sure FODMAP levels stay low.  Shopping  Shop with a list of foods that are recommended on this diet and make a meal plan.  Meal planning  Follow a low-FODMAP eating plan for up to 6 weeks, or as told by your health care provider or dietitian.  To follow the eating plan:  Eliminate high-FODMAP foods from your diet completely. Choose only low-FODMAP foods to eat. You will do this for 2-6 weeks.  Gradually reintroduce high-FODMAP foods into your diet one at a time. Most people should wait a few days before introducing the next new high-FODMAP food into their meal plan. Your dietitian can recommend how quickly you may reintroduce foods.  Keep a daily record of what and how much you eat and drink. Make note of any symptoms that you have after eating.  Review your daily record with a dietitian regularly to identify which foods you can eat and which foods you should avoid.  General tips  Drink enough fluid each day to keep your urine pale yellow.  Avoid processed foods. These often have added sugar and may be high in FODMAPs.  Avoid most dairy products, whole grains, and sweeteners.  Work with a dietitian to make sure you get enough fiber in your diet.  Avoid high FODMAP foods at meals to manage symptoms.     Recommended foods  Fruits  Bananas, oranges, tangerines, quynh, limes, blueberries, raspberries, strawberries, grapes, cantaloupe, honeydew melon, kiwi, papaya, passion fruit, and pineapple. Limited amounts of dried cranberries, banana chips, and shredded coconut.  Vegetables  Eggplant, zucchini, cucumber, peppers, green beans, bean sprouts, lettuce, arugula, kale, Swiss chard,  "spinach, rickie greens, bok dimas, summer squash, potato, and tomato. Limited amounts of corn, carrot, and sweet potato. Green parts of scallions.  Grains  Gluten-free grains, such as rice, oats, buckwheat, quinoa, corn, polenta, and millet. Gluten-free pasta, bread, or cereal. Rice noodles. Corn tortillas.  Meats and other proteins  Unseasoned beef, pork, poultry, or fish. Eggs. Driscoll. Tofu (firm) and tempeh. Limited amounts of nuts and seeds, such as almonds, walnuts, brazil nuts, pecans, peanuts, nut butters, pumpkin seeds, deann seeds, and sunflower seeds.  Dairy  Lactose-free milk, yogurt, and kefir. Lactose-free cottage cheese and ice cream. Non-dairy milks, such as almond, coconut, hemp, and rice milk. Non-dairy yogurt. Limited amounts of goat cheese, brie, mozzarella, parmesan, swiss, and other hard cheeses.  Fats and oils  Butter-free spreads. Vegetable oils, such as olive, canola, and sunflower oil.  Seasoning and other foods  Artificial sweeteners with names that do not end in \"ol,\" such as aspartame, saccharine, and stevia. Maple syrup, white table sugar, raw sugar, brown sugar, and molasses. Mayonnaise, soy sauce, and tamari. Fresh basil, coriander, parsley, rosemary, and thyme.  Beverages  Water and mineral water. Sugar-sweetened soft drinks. Small amounts of orange juice or cranberry juice. Black and green tea. Most dry kan. Coffee.  The items listed above may not be a complete list of foods and beverages you can eat. Contact a dietitian for more information.     Foods to avoid  Fruits  Fresh, dried, and juiced forms of apple, pear, watermelon, peach, plum, cherries, apricots, blackberries, boysenberries, figs, nectarines, and johny. Avocado.  Vegetables  Chicory root, artichoke, asparagus, cabbage, snow peas, [place] sprouts, broccoli, sugar snap peas, mushrooms, celery, and cauliflower. Onions, garlic, leeks, and the white part of scallions.  Grains  Wheat, including kamut, durum, and semolina. " Barley and bulgur. Couscous. Wheat-based cereals. Wheat noodles, bread, crackers, and pastries.  Meats and other proteins  Fried or fatty meat. Sausage. Cashews and pistachios. Soybeans, baked beans, black beans, chickpeas, kidney beans, leidy beans, navy beans, lentils, black-eyed peas, and split peas.  Dairy  Milk, yogurt, ice cream, and soft cheese. Cream and sour cream. Milk-based sauces. Custard. Buttermilk. Soy milk.  Seasoning and other foods  Any sugar-free gum or candy. Foods that contain artificial sweeteners such as sorbitol, mannitol, isomalt, or xylitol. Foods that contain honey, high-fructose corn syrup, or agave. Bouillon, vegetable stock, beef stock, and chicken stock. Garlic and onion powder. Condiments made with onion, such as hummus, chutney, pickles, relish, salad dressing, and salsa. Tomato paste.  Beverages  Chicory-based drinks. Coffee substitutes. Chamomile tea. Fennel tea. Sweet or fortified kan such as port or amira. Diet soft drinks made with isomalt, mannitol, maltitol, sorbitol, or xylitol. Apple, pear, and ojhny juice. Juices with high-fructose corn syrup.  The items listed above may not be a complete list of foods and beverages you should avoid. Contact a dietitian for more information.  Summary  FODMAP stands for fermentable oligosaccharides, disaccharides, monosaccharides, and polyols. These are sugars that are hard for some people to digest.  A low-FODMAP eating plan is a short-term diet that helps to ease symptoms of certain bowel diseases.  The eating plan usually lasts up to 6 weeks. After that, high-FODMAP foods are reintroduced gradually and one at a time. This can help you find out which foods may be causing symptoms.  A low-FODMAP eating plan can be complicated. It is best to work with a dietitian who has experience with this type of plan.  This information is not intended to replace advice given to you by your health care provider. Make sure you discuss any questions you  have with your health care provider.  Document Revised: 05/06/2021 Document Reviewed: 05/06/2021  Elsevier Patient Education © 2022 [employer]

## 2024-06-14 ENCOUNTER — HOSPITAL ENCOUNTER (OUTPATIENT)
Dept: CT IMAGING | Facility: HOSPITAL | Age: 74
Discharge: HOME | End: 2024-06-14
Payer: MEDICARE

## 2024-06-14 DIAGNOSIS — R42 DIZZINESS AND GIDDINESS: ICD-10-CM

## 2024-06-14 DIAGNOSIS — R41.3 OTHER AMNESIA: ICD-10-CM

## 2024-06-14 DIAGNOSIS — G43.019 HEADACHE, COMMON MIGRAINE, INTRACTABLE: ICD-10-CM

## 2024-06-14 PROCEDURE — 70450 CT HEAD/BRAIN W/O DYE: CPT

## 2024-06-18 ENCOUNTER — HOSPITAL ENCOUNTER (OUTPATIENT)
Facility: HOSPITAL | Age: 74
Setting detail: OBSERVATION
Discharge: HOME | End: 2024-06-20
Attending: STUDENT IN AN ORGANIZED HEALTH CARE EDUCATION/TRAINING PROGRAM | Admitting: FAMILY MEDICINE
Payer: MEDICARE

## 2024-06-18 ENCOUNTER — APPOINTMENT (OUTPATIENT)
Dept: GENERAL RADIOLOGY | Facility: HOSPITAL | Age: 74
End: 2024-06-18
Payer: MEDICARE

## 2024-06-18 ENCOUNTER — TELEPHONE (OUTPATIENT)
Dept: CARDIOLOGY | Facility: CLINIC | Age: 74
End: 2024-06-18
Payer: MEDICARE

## 2024-06-18 DIAGNOSIS — N17.9 ACUTE KIDNEY INJURY: ICD-10-CM

## 2024-06-18 DIAGNOSIS — I95.1 ORTHOSTATIC HYPOTENSION: Primary | ICD-10-CM

## 2024-06-18 DIAGNOSIS — R06.09 DOE (DYSPNEA ON EXERTION): ICD-10-CM

## 2024-06-18 LAB
ALBUMIN SERPL-MCNC: 4.4 G/DL (ref 3.5–5.2)
ALBUMIN/GLOB SERPL: 1.6 G/DL
ALP SERPL-CCNC: 47 U/L (ref 39–117)
ALT SERPL W P-5'-P-CCNC: 16 U/L (ref 1–33)
ANION GAP SERPL CALCULATED.3IONS-SCNC: 14.8 MMOL/L (ref 5–15)
AST SERPL-CCNC: 24 U/L (ref 1–32)
BACTERIA UR QL AUTO: ABNORMAL /HPF
BASOPHILS # BLD AUTO: 0.05 10*3/MM3 (ref 0–0.2)
BASOPHILS NFR BLD AUTO: 0.7 % (ref 0–1.5)
BILIRUB SERPL-MCNC: 1 MG/DL (ref 0–1.2)
BILIRUB UR QL STRIP: NEGATIVE
BUN SERPL-MCNC: 21 MG/DL (ref 8–23)
BUN/CREAT SERPL: 11.9 (ref 7–25)
CALCIUM SPEC-SCNC: 9.8 MG/DL (ref 8.6–10.5)
CHLORIDE SERPL-SCNC: 95 MMOL/L (ref 98–107)
CLARITY UR: ABNORMAL
CO2 SERPL-SCNC: 27.2 MMOL/L (ref 22–29)
COLOR UR: YELLOW
CREAT SERPL-MCNC: 1.76 MG/DL (ref 0.57–1)
DEPRECATED RDW RBC AUTO: 38.9 FL (ref 37–54)
EGFRCR SERPLBLD CKD-EPI 2021: 29.9 ML/MIN/1.73
EOSINOPHIL # BLD AUTO: 0.1 10*3/MM3 (ref 0–0.4)
EOSINOPHIL NFR BLD AUTO: 1.3 % (ref 0.3–6.2)
ERYTHROCYTE [DISTWIDTH] IN BLOOD BY AUTOMATED COUNT: 12 % (ref 12.3–15.4)
FLUAV RNA RESP QL NAA+PROBE: NOT DETECTED
FLUBV RNA RESP QL NAA+PROBE: NOT DETECTED
GLOBULIN UR ELPH-MCNC: 2.8 GM/DL
GLUCOSE BLDC GLUCOMTR-MCNC: 233 MG/DL (ref 70–130)
GLUCOSE SERPL-MCNC: 157 MG/DL (ref 65–99)
GLUCOSE UR STRIP-MCNC: NEGATIVE MG/DL
HBA1C MFR BLD: 8.3 % (ref 4.8–5.6)
HCT VFR BLD AUTO: 38.7 % (ref 34–46.6)
HGB BLD-MCNC: 13.3 G/DL (ref 12–15.9)
HGB UR QL STRIP.AUTO: ABNORMAL
HOLD SPECIMEN: NORMAL
HOLD SPECIMEN: NORMAL
HYALINE CASTS UR QL AUTO: ABNORMAL /LPF
IMM GRANULOCYTES # BLD AUTO: 0.02 10*3/MM3 (ref 0–0.05)
IMM GRANULOCYTES NFR BLD AUTO: 0.3 % (ref 0–0.5)
KETONES UR QL STRIP: NEGATIVE
LEUKOCYTE ESTERASE UR QL STRIP.AUTO: NEGATIVE
LYMPHOCYTES # BLD AUTO: 2.97 10*3/MM3 (ref 0.7–3.1)
LYMPHOCYTES NFR BLD AUTO: 39.8 % (ref 19.6–45.3)
MAGNESIUM SERPL-MCNC: 2.2 MG/DL (ref 1.6–2.4)
MCH RBC QN AUTO: 30.4 PG (ref 26.6–33)
MCHC RBC AUTO-ENTMCNC: 34.4 G/DL (ref 31.5–35.7)
MCV RBC AUTO: 88.4 FL (ref 79–97)
MONOCYTES # BLD AUTO: 0.61 10*3/MM3 (ref 0.1–0.9)
MONOCYTES NFR BLD AUTO: 8.2 % (ref 5–12)
NEUTROPHILS NFR BLD AUTO: 3.71 10*3/MM3 (ref 1.7–7)
NEUTROPHILS NFR BLD AUTO: 49.7 % (ref 42.7–76)
NITRITE UR QL STRIP: NEGATIVE
NRBC BLD AUTO-RTO: 0 /100 WBC (ref 0–0.2)
NT-PROBNP SERPL-MCNC: 162.5 PG/ML (ref 0–1800)
PH UR STRIP.AUTO: 5.5 [PH] (ref 5–8)
PLATELET # BLD AUTO: 262 10*3/MM3 (ref 140–450)
PMV BLD AUTO: 10.3 FL (ref 6–12)
POTASSIUM SERPL-SCNC: 3 MMOL/L (ref 3.5–5.2)
PROCALCITONIN SERPL-MCNC: 0.13 NG/ML (ref 0–0.25)
PROT SERPL-MCNC: 7.2 G/DL (ref 6–8.5)
PROT UR QL STRIP: ABNORMAL
RBC # BLD AUTO: 4.38 10*6/MM3 (ref 3.77–5.28)
RBC # UR STRIP: ABNORMAL /HPF
REF LAB TEST METHOD: ABNORMAL
RSV RNA RESP QL NAA+PROBE: NOT DETECTED
SARS-COV-2 RNA RESP QL NAA+PROBE: NOT DETECTED
SODIUM SERPL-SCNC: 137 MMOL/L (ref 136–145)
SP GR UR STRIP: 1.01 (ref 1–1.03)
SQUAMOUS #/AREA URNS HPF: ABNORMAL /HPF
TROPONIN T SERPL HS-MCNC: 16 NG/L
UROBILINOGEN UR QL STRIP: ABNORMAL
WBC # UR STRIP: ABNORMAL /HPF
WBC NRBC COR # BLD AUTO: 7.46 10*3/MM3 (ref 3.4–10.8)
WHOLE BLOOD HOLD COAG: NORMAL
WHOLE BLOOD HOLD SPECIMEN: NORMAL

## 2024-06-18 PROCEDURE — 81001 URINALYSIS AUTO W/SCOPE: CPT

## 2024-06-18 PROCEDURE — 82948 REAGENT STRIP/BLOOD GLUCOSE: CPT | Performed by: FAMILY MEDICINE

## 2024-06-18 PROCEDURE — 93005 ELECTROCARDIOGRAM TRACING: CPT

## 2024-06-18 PROCEDURE — 87637 SARSCOV2&INF A&B&RSV AMP PRB: CPT

## 2024-06-18 PROCEDURE — 63710000001 INSULIN LISPRO (HUMAN) PER 5 UNITS: Performed by: FAMILY MEDICINE

## 2024-06-18 PROCEDURE — 84484 ASSAY OF TROPONIN QUANT: CPT

## 2024-06-18 PROCEDURE — G0378 HOSPITAL OBSERVATION PER HR: HCPCS

## 2024-06-18 PROCEDURE — 85025 COMPLETE CBC W/AUTO DIFF WBC: CPT

## 2024-06-18 PROCEDURE — 96372 THER/PROPH/DIAG INJ SC/IM: CPT

## 2024-06-18 PROCEDURE — 71045 X-RAY EXAM CHEST 1 VIEW: CPT

## 2024-06-18 PROCEDURE — 83880 ASSAY OF NATRIURETIC PEPTIDE: CPT

## 2024-06-18 PROCEDURE — 84145 PROCALCITONIN (PCT): CPT

## 2024-06-18 PROCEDURE — 25810000003 SODIUM CHLORIDE 0.9 % SOLUTION

## 2024-06-18 PROCEDURE — 99223 1ST HOSP IP/OBS HIGH 75: CPT | Performed by: FAMILY MEDICINE

## 2024-06-18 PROCEDURE — 99285 EMERGENCY DEPT VISIT HI MDM: CPT

## 2024-06-18 PROCEDURE — 25810000003 SODIUM CHLORIDE 0.9 % SOLUTION: Performed by: FAMILY MEDICINE

## 2024-06-18 PROCEDURE — 25010000002 HEPARIN (PORCINE) PER 1000 UNITS: Performed by: FAMILY MEDICINE

## 2024-06-18 PROCEDURE — 83735 ASSAY OF MAGNESIUM: CPT

## 2024-06-18 PROCEDURE — 80053 COMPREHEN METABOLIC PANEL: CPT

## 2024-06-18 PROCEDURE — 83036 HEMOGLOBIN GLYCOSYLATED A1C: CPT | Performed by: FAMILY MEDICINE

## 2024-06-18 RX ORDER — POTASSIUM CHLORIDE 750 MG/1
40 CAPSULE, EXTENDED RELEASE ORAL ONCE
Status: COMPLETED | OUTPATIENT
Start: 2024-06-18 | End: 2024-06-18

## 2024-06-18 RX ORDER — NITROGLYCERIN 0.4 MG/1
0.4 TABLET SUBLINGUAL
Status: DISCONTINUED | OUTPATIENT
Start: 2024-06-18 | End: 2024-06-20 | Stop reason: HOSPADM

## 2024-06-18 RX ORDER — VENLAFAXINE HYDROCHLORIDE 75 MG/1
75 CAPSULE, EXTENDED RELEASE ORAL DAILY
Status: DISCONTINUED | OUTPATIENT
Start: 2024-06-19 | End: 2024-06-18

## 2024-06-18 RX ORDER — ASPIRIN 81 MG/1
81 TABLET, CHEWABLE ORAL DAILY
Status: DISCONTINUED | OUTPATIENT
Start: 2024-06-19 | End: 2024-06-20 | Stop reason: HOSPADM

## 2024-06-18 RX ORDER — INSULIN LISPRO 100 [IU]/ML
2-9 INJECTION, SOLUTION INTRAVENOUS; SUBCUTANEOUS
Status: DISCONTINUED | OUTPATIENT
Start: 2024-06-18 | End: 2024-06-20 | Stop reason: HOSPADM

## 2024-06-18 RX ORDER — VENLAFAXINE HYDROCHLORIDE 75 MG/1
75 CAPSULE, EXTENDED RELEASE ORAL NIGHTLY
Status: DISCONTINUED | OUTPATIENT
Start: 2024-06-18 | End: 2024-06-20 | Stop reason: HOSPADM

## 2024-06-18 RX ORDER — ACETAMINOPHEN 160 MG/5ML
650 SOLUTION ORAL EVERY 4 HOURS PRN
Status: DISCONTINUED | OUTPATIENT
Start: 2024-06-18 | End: 2024-06-20 | Stop reason: HOSPADM

## 2024-06-18 RX ORDER — LEVOTHYROXINE SODIUM 0.05 MG/1
25 TABLET ORAL DAILY
Status: DISCONTINUED | OUTPATIENT
Start: 2024-06-19 | End: 2024-06-20 | Stop reason: HOSPADM

## 2024-06-18 RX ORDER — BISACODYL 5 MG/1
5 TABLET, DELAYED RELEASE ORAL DAILY PRN
Status: DISCONTINUED | OUTPATIENT
Start: 2024-06-18 | End: 2024-06-20 | Stop reason: HOSPADM

## 2024-06-18 RX ORDER — UREA 10 %
5 LOTION (ML) TOPICAL NIGHTLY PRN
Status: DISCONTINUED | OUTPATIENT
Start: 2024-06-18 | End: 2024-06-20 | Stop reason: HOSPADM

## 2024-06-18 RX ORDER — SODIUM CHLORIDE 0.9 % (FLUSH) 0.9 %
10 SYRINGE (ML) INJECTION AS NEEDED
Status: DISCONTINUED | OUTPATIENT
Start: 2024-06-18 | End: 2024-06-20 | Stop reason: HOSPADM

## 2024-06-18 RX ORDER — PANTOPRAZOLE SODIUM 40 MG/1
40 TABLET, DELAYED RELEASE ORAL
Status: DISCONTINUED | OUTPATIENT
Start: 2024-06-19 | End: 2024-06-20 | Stop reason: HOSPADM

## 2024-06-18 RX ORDER — ROSUVASTATIN CALCIUM 20 MG/1
40 TABLET, COATED ORAL DAILY
Status: DISCONTINUED | OUTPATIENT
Start: 2024-06-19 | End: 2024-06-20 | Stop reason: HOSPADM

## 2024-06-18 RX ORDER — SODIUM CHLORIDE 0.9 % (FLUSH) 0.9 %
10 SYRINGE (ML) INJECTION EVERY 12 HOURS SCHEDULED
Status: DISCONTINUED | OUTPATIENT
Start: 2024-06-18 | End: 2024-06-20 | Stop reason: HOSPADM

## 2024-06-18 RX ORDER — DEXTROSE MONOHYDRATE 25 G/50ML
25 INJECTION, SOLUTION INTRAVENOUS
Status: DISCONTINUED | OUTPATIENT
Start: 2024-06-18 | End: 2024-06-20 | Stop reason: HOSPADM

## 2024-06-18 RX ORDER — AMOXICILLIN 250 MG
2 CAPSULE ORAL 2 TIMES DAILY PRN
Status: DISCONTINUED | OUTPATIENT
Start: 2024-06-18 | End: 2024-06-20 | Stop reason: HOSPADM

## 2024-06-18 RX ORDER — SODIUM CHLORIDE 9 MG/ML
40 INJECTION, SOLUTION INTRAVENOUS AS NEEDED
Status: DISCONTINUED | OUTPATIENT
Start: 2024-06-18 | End: 2024-06-20 | Stop reason: HOSPADM

## 2024-06-18 RX ORDER — HEPARIN SODIUM 5000 [USP'U]/ML
5000 INJECTION, SOLUTION INTRAVENOUS; SUBCUTANEOUS EVERY 12 HOURS SCHEDULED
Status: DISCONTINUED | OUTPATIENT
Start: 2024-06-18 | End: 2024-06-20 | Stop reason: HOSPADM

## 2024-06-18 RX ORDER — SODIUM CHLORIDE 9 MG/ML
75 INJECTION, SOLUTION INTRAVENOUS CONTINUOUS
Status: DISCONTINUED | OUTPATIENT
Start: 2024-06-18 | End: 2024-06-20 | Stop reason: HOSPADM

## 2024-06-18 RX ORDER — POLYETHYLENE GLYCOL 3350 17 G/17G
17 POWDER, FOR SOLUTION ORAL DAILY PRN
Status: DISCONTINUED | OUTPATIENT
Start: 2024-06-18 | End: 2024-06-20 | Stop reason: HOSPADM

## 2024-06-18 RX ORDER — NICOTINE POLACRILEX 4 MG
15 LOZENGE BUCCAL
Status: DISCONTINUED | OUTPATIENT
Start: 2024-06-18 | End: 2024-06-20 | Stop reason: HOSPADM

## 2024-06-18 RX ORDER — ONDANSETRON 2 MG/ML
4 INJECTION INTRAMUSCULAR; INTRAVENOUS EVERY 6 HOURS PRN
Status: DISCONTINUED | OUTPATIENT
Start: 2024-06-18 | End: 2024-06-20 | Stop reason: HOSPADM

## 2024-06-18 RX ORDER — ACETAMINOPHEN 325 MG/1
650 TABLET ORAL EVERY 4 HOURS PRN
Status: DISCONTINUED | OUTPATIENT
Start: 2024-06-18 | End: 2024-06-20 | Stop reason: HOSPADM

## 2024-06-18 RX ORDER — BISACODYL 10 MG
10 SUPPOSITORY, RECTAL RECTAL DAILY PRN
Status: DISCONTINUED | OUTPATIENT
Start: 2024-06-18 | End: 2024-06-20 | Stop reason: HOSPADM

## 2024-06-18 RX ADMIN — SODIUM CHLORIDE 1000 ML: 900 INJECTION, SOLUTION INTRAVENOUS at 18:46

## 2024-06-18 RX ADMIN — Medication 10 ML: at 22:34

## 2024-06-18 RX ADMIN — HEPARIN SODIUM 5000 UNITS: 5000 INJECTION INTRAVENOUS; SUBCUTANEOUS at 22:34

## 2024-06-18 RX ADMIN — VENLAFAXINE HYDROCHLORIDE 75 MG: 75 CAPSULE, EXTENDED RELEASE ORAL at 22:34

## 2024-06-18 RX ADMIN — POTASSIUM CHLORIDE 40 MEQ: 750 CAPSULE, EXTENDED RELEASE ORAL at 18:56

## 2024-06-18 RX ADMIN — SODIUM CHLORIDE 75 ML/HR: 900 INJECTION, SOLUTION INTRAVENOUS at 22:35

## 2024-06-18 RX ADMIN — INSULIN LISPRO 4 UNITS: 100 INJECTION, SOLUTION INTRAVENOUS; SUBCUTANEOUS at 22:34
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
## 2024-06-18 NOTE — TELEPHONE ENCOUNTER
Hub staff attempted to follow warm transfer process and was unsuccessful     Caller: Latonya Mei    Relationship to patient: Self    Best call back number: [phone]     Patient is needing: PT WAS TOLD TO CALL BACK AND LET US KNOW HOW SHE WAS DOING ON THE MEDS, STATES THIS ISN'T WORKING FOR HER. PT IS HAVING THE SAME SYMPTOMS     SOB. LIGHTLESSNESS, DIZZINESS   ACTIVE NOW A LITTLE BIT, SHE IS SITTING BUT WHEN SHE GETS UP THIS GETS WORSE   HAS BEEN GOING ON SINCE LAST WEEK     PT STATES WHEN DAUGHTER IN LAW GETS OFF WORK SHE IS GOING TO THE ER JUST MAKING OFFICE AWARE.       metoprolol succinate XL (Toprol XL) 100 MG 24 hr tablet [53657] (Order [id number])  furosemide   potassium chloride

## 2024-06-18 NOTE — Clinical Note
Allergies reviewed.  H&P note has been confirmed for the patient. Procedural consent has been signed.  Staff has reviewed the patient's labs.  Labs have been reviewed and show abnormalities. Physician is aware of labs. The patient has denied she is pregnant.

## 2024-06-18 NOTE — Clinical Note
Hemostasis started on the right radial artery. R-Band was used in achieving hemostasis. Radial compression device applied to vessel. Hemostasis achieved successfully. Closure device additional comment: 13cc
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
## 2024-06-19 ENCOUNTER — APPOINTMENT (OUTPATIENT)
Dept: ULTRASOUND IMAGING | Facility: HOSPITAL | Age: 74
End: 2024-06-19
Payer: MEDICARE

## 2024-06-19 ENCOUNTER — APPOINTMENT (OUTPATIENT)
Dept: CARDIOLOGY | Facility: HOSPITAL | Age: 74
End: 2024-06-19
Payer: MEDICARE

## 2024-06-19 LAB
ANION GAP SERPL CALCULATED.3IONS-SCNC: 9.1 MMOL/L (ref 5–15)
BH CV ECHO MEAS - AI P1/2T: 392.7 MSEC
BH CV ECHO MEAS - AO MAX PG: 9.9 MMHG
BH CV ECHO MEAS - AO MEAN PG: 5 MMHG
BH CV ECHO MEAS - AO ROOT DIAM: 2.9 CM
BH CV ECHO MEAS - AO V2 MAX: 157 CM/SEC
BH CV ECHO MEAS - AO V2 VTI: 30.9 CM
BH CV ECHO MEAS - AVA(I,D): 2.48 CM2
BH CV ECHO MEAS - EDV(CUBED): 79 ML
BH CV ECHO MEAS - EDV(MOD-SP2): 53.5 ML
BH CV ECHO MEAS - EDV(MOD-SP4): 60.5 ML
BH CV ECHO MEAS - EF(MOD-BP): 71.4 %
BH CV ECHO MEAS - EF(MOD-SP2): 69.7 %
BH CV ECHO MEAS - EF(MOD-SP4): 72.6 %
BH CV ECHO MEAS - EF_3D-VOL: 72 %
BH CV ECHO MEAS - ESV(CUBED): 29.8 ML
BH CV ECHO MEAS - ESV(MOD-SP2): 16.2 ML
BH CV ECHO MEAS - ESV(MOD-SP4): 16.6 ML
BH CV ECHO MEAS - FS: 27.7 %
BH CV ECHO MEAS - IVS/LVPW: 1.04 CM
BH CV ECHO MEAS - IVSD: 1.17 CM
BH CV ECHO MEAS - LA DIMENSION: 3.4 CM
BH CV ECHO MEAS - LAT PEAK E' VEL: 8.3 CM/SEC
BH CV ECHO MEAS - LV DIASTOLIC VOL/BSA (35-75): 33.3 CM2
BH CV ECHO MEAS - LV MASS(C)D: 173 GRAMS
BH CV ECHO MEAS - LV MAX PG: 5.1 MMHG
BH CV ECHO MEAS - LV MEAN PG: 2 MMHG
BH CV ECHO MEAS - LV SYSTOLIC VOL/BSA (12-30): 9.1 CM2
BH CV ECHO MEAS - LV V1 MAX: 113 CM/SEC
BH CV ECHO MEAS - LV V1 VTI: 25.4 CM
BH CV ECHO MEAS - LVIDD: 4.3 CM
BH CV ECHO MEAS - LVIDS: 3.1 CM
BH CV ECHO MEAS - LVOT AREA: 3 CM2
BH CV ECHO MEAS - LVOT DIAM: 1.96 CM
BH CV ECHO MEAS - LVPWD: 1.13 CM
BH CV ECHO MEAS - MED PEAK E' VEL: 8.6 CM/SEC
BH CV ECHO MEAS - MV A MAX VEL: 82.1 CM/SEC
BH CV ECHO MEAS - MV DEC SLOPE: 398 CM/SEC2
BH CV ECHO MEAS - MV DEC TIME: 0.23 SEC
BH CV ECHO MEAS - MV E MAX VEL: 91.5 CM/SEC
BH CV ECHO MEAS - MV E/A: 1.11
BH CV ECHO MEAS - MV MAX PG: 3.7 MMHG
BH CV ECHO MEAS - MV MEAN PG: 2 MMHG
BH CV ECHO MEAS - MV V2 VTI: 27.1 CM
BH CV ECHO MEAS - MVA(VTI): 2.8 CM2
BH CV ECHO MEAS - PA ACC TIME: 0.13 SEC
BH CV ECHO MEAS - PA V2 MAX: 73.9 CM/SEC
BH CV ECHO MEAS - RAP SYSTOLE: 3 MMHG
BH CV ECHO MEAS - RV MAX PG: 1.48 MMHG
BH CV ECHO MEAS - RV V1 MAX: 60.8 CM/SEC
BH CV ECHO MEAS - RV V1 VTI: 13.4 CM
BH CV ECHO MEAS - SV(LVOT): 76.6 ML
BH CV ECHO MEAS - SV(MOD-SP2): 37.3 ML
BH CV ECHO MEAS - SV(MOD-SP4): 43.9 ML
BH CV ECHO MEAS - SVI(LVOT): 42.2 ML/M2
BH CV ECHO MEAS - SVI(MOD-SP2): 20.6 ML/M2
BH CV ECHO MEAS - SVI(MOD-SP4): 24.2 ML/M2
BH CV ECHO MEAS - TAPSE (>1.6): 2.5 CM
BH CV ECHO MEASUREMENTS AVERAGE E/E' RATIO: 10.83
BH CV XLRA - RV BASE: 3.2 CM
BH CV XLRA - RV MID: 3.3 CM
BH CV XLRA - TDI S': 12 CM/SEC
BUN SERPL-MCNC: 19 MG/DL (ref 8–23)
BUN/CREAT SERPL: 15 (ref 7–25)
CALCIUM SPEC-SCNC: 8.7 MG/DL (ref 8.6–10.5)
CHLORIDE SERPL-SCNC: 104 MMOL/L (ref 98–107)
CO2 SERPL-SCNC: 25.9 MMOL/L (ref 22–29)
CREAT SERPL-MCNC: 1.27 MG/DL (ref 0.57–1)
DEPRECATED RDW RBC AUTO: 41.1 FL (ref 37–54)
EGFRCR SERPLBLD CKD-EPI 2021: 44.2 ML/MIN/1.73
ERYTHROCYTE [DISTWIDTH] IN BLOOD BY AUTOMATED COUNT: 12.3 % (ref 12.3–15.4)
GLUCOSE BLDC GLUCOMTR-MCNC: 119 MG/DL (ref 70–130)
GLUCOSE BLDC GLUCOMTR-MCNC: 123 MG/DL (ref 70–130)
GLUCOSE BLDC GLUCOMTR-MCNC: 182 MG/DL (ref 70–130)
GLUCOSE BLDC GLUCOMTR-MCNC: 226 MG/DL (ref 70–130)
GLUCOSE SERPL-MCNC: 118 MG/DL (ref 65–99)
HCT VFR BLD AUTO: 34.1 % (ref 34–46.6)
HGB BLD-MCNC: 11.2 G/DL (ref 12–15.9)
MCH RBC QN AUTO: 30.1 PG (ref 26.6–33)
MCHC RBC AUTO-ENTMCNC: 32.8 G/DL (ref 31.5–35.7)
MCV RBC AUTO: 91.7 FL (ref 79–97)
PLATELET # BLD AUTO: 200 10*3/MM3 (ref 140–450)
PMV BLD AUTO: 10.7 FL (ref 6–12)
POTASSIUM SERPL-SCNC: 3 MMOL/L (ref 3.5–5.2)
POTASSIUM SERPL-SCNC: 4.3 MMOL/L (ref 3.5–5.2)
RBC # BLD AUTO: 3.72 10*6/MM3 (ref 3.77–5.28)
SODIUM SERPL-SCNC: 139 MMOL/L (ref 136–145)
WBC NRBC COR # BLD AUTO: 4.81 10*3/MM3 (ref 3.4–10.8)

## 2024-06-19 PROCEDURE — 25010000002 FENTANYL CITRATE (PF) 50 MCG/ML SOLUTION: Performed by: INTERNAL MEDICINE

## 2024-06-19 PROCEDURE — 25010000002 LIDOCAINE 1 % SOLUTION: Performed by: INTERNAL MEDICINE

## 2024-06-19 PROCEDURE — 25010000002 MIDAZOLAM PER 1MG: Performed by: INTERNAL MEDICINE

## 2024-06-19 PROCEDURE — 25010000002 HEPARIN (PORCINE) PER 1000 UNITS: Performed by: INTERNAL MEDICINE

## 2024-06-19 PROCEDURE — 82948 REAGENT STRIP/BLOOD GLUCOSE: CPT

## 2024-06-19 PROCEDURE — 99232 SBSQ HOSP IP/OBS MODERATE 35: CPT | Performed by: NURSE PRACTITIONER

## 2024-06-19 PROCEDURE — 63710000001 VENLAFAXINE XR 75 MG CAPSULE SUSTAINED-RELEASE 24 HR: Performed by: INTERNAL MEDICINE

## 2024-06-19 PROCEDURE — 84132 ASSAY OF SERUM POTASSIUM: CPT | Performed by: INTERNAL MEDICINE

## 2024-06-19 PROCEDURE — 25810000003 SODIUM CHLORIDE 0.9 % SOLUTION: Performed by: INTERNAL MEDICINE

## 2024-06-19 PROCEDURE — 25510000001 IOPAMIDOL PER 1 ML: Performed by: INTERNAL MEDICINE

## 2024-06-19 PROCEDURE — C1769 GUIDE WIRE: HCPCS | Performed by: INTERNAL MEDICINE

## 2024-06-19 PROCEDURE — A9270 NON-COVERED ITEM OR SERVICE: HCPCS | Performed by: NURSE PRACTITIONER

## 2024-06-19 PROCEDURE — 96372 THER/PROPH/DIAG INJ SC/IM: CPT

## 2024-06-19 PROCEDURE — G0378 HOSPITAL OBSERVATION PER HR: HCPCS

## 2024-06-19 PROCEDURE — 93454 CORONARY ARTERY ANGIO S&I: CPT | Performed by: INTERNAL MEDICINE

## 2024-06-19 PROCEDURE — 25810000003 SODIUM CHLORIDE 0.9 % SOLUTION: Performed by: FAMILY MEDICINE

## 2024-06-19 PROCEDURE — 93306 TTE W/DOPPLER COMPLETE: CPT | Performed by: INTERNAL MEDICINE

## 2024-06-19 PROCEDURE — C1894 INTRO/SHEATH, NON-LASER: HCPCS | Performed by: INTERNAL MEDICINE

## 2024-06-19 PROCEDURE — 80048 BASIC METABOLIC PNL TOTAL CA: CPT | Performed by: FAMILY MEDICINE

## 2024-06-19 PROCEDURE — 99214 OFFICE O/P EST MOD 30 MIN: CPT | Performed by: INTERNAL MEDICINE

## 2024-06-19 PROCEDURE — 76775 US EXAM ABDO BACK WALL LIM: CPT

## 2024-06-19 PROCEDURE — A9270 NON-COVERED ITEM OR SERVICE: HCPCS | Performed by: INTERNAL MEDICINE

## 2024-06-19 PROCEDURE — 63710000001 POTASSIUM CHLORIDE 20 MEQ PACK: Performed by: NURSE PRACTITIONER

## 2024-06-19 PROCEDURE — 85027 COMPLETE CBC AUTOMATED: CPT | Performed by: FAMILY MEDICINE

## 2024-06-19 PROCEDURE — 82948 REAGENT STRIP/BLOOD GLUCOSE: CPT | Performed by: FAMILY MEDICINE

## 2024-06-19 PROCEDURE — 93306 TTE W/DOPPLER COMPLETE: CPT

## 2024-06-19 PROCEDURE — 63710000001 INSULIN LISPRO (HUMAN) PER 5 UNITS: Performed by: INTERNAL MEDICINE

## 2024-06-19 PROCEDURE — 93567 NJX CAR CTH SPRVLV AORTGRPHY: CPT | Performed by: INTERNAL MEDICINE

## 2024-06-19 PROCEDURE — 93923 UPR/LXTR ART STDY 3+ LVLS: CPT

## 2024-06-19 PROCEDURE — 25010000002 HEPARIN (PORCINE) PER 1000 UNITS: Performed by: FAMILY MEDICINE

## 2024-06-19 RX ORDER — POTASSIUM CHLORIDE 1.5 G/1.58G
40 POWDER, FOR SOLUTION ORAL EVERY 4 HOURS
Status: COMPLETED | OUTPATIENT
Start: 2024-06-19 | End: 2024-06-19

## 2024-06-19 RX ORDER — LIDOCAINE HYDROCHLORIDE 10 MG/ML
INJECTION, SOLUTION INFILTRATION; PERINEURAL
Status: DISCONTINUED | OUTPATIENT
Start: 2024-06-19 | End: 2024-06-19 | Stop reason: HOSPADM

## 2024-06-19 RX ORDER — POTASSIUM CHLORIDE 20 MEQ/1
40 TABLET, EXTENDED RELEASE ORAL EVERY 4 HOURS
Status: DISCONTINUED | OUTPATIENT
Start: 2024-06-19 | End: 2024-06-19

## 2024-06-19 RX ORDER — HEPARIN SODIUM 1000 [USP'U]/ML
INJECTION, SOLUTION INTRAVENOUS; SUBCUTANEOUS
Status: DISCONTINUED | OUTPATIENT
Start: 2024-06-19 | End: 2024-06-19 | Stop reason: HOSPADM

## 2024-06-19 RX ORDER — MIDAZOLAM HYDROCHLORIDE 2 MG/2ML
INJECTION, SOLUTION INTRAMUSCULAR; INTRAVENOUS
Status: DISCONTINUED | OUTPATIENT
Start: 2024-06-19 | End: 2024-06-19 | Stop reason: HOSPADM

## 2024-06-19 RX ORDER — SODIUM CHLORIDE 9 MG/ML
100 INJECTION, SOLUTION INTRAVENOUS CONTINUOUS
Status: ACTIVE | OUTPATIENT
Start: 2024-06-19 | End: 2024-06-19

## 2024-06-19 RX ORDER — NALOXONE HCL 0.4 MG/ML
0.4 VIAL (ML) INJECTION
Status: DISCONTINUED | OUTPATIENT
Start: 2024-06-19 | End: 2024-06-20 | Stop reason: HOSPADM

## 2024-06-19 RX ORDER — HYDROCODONE BITARTRATE AND ACETAMINOPHEN 5; 325 MG/1; MG/1
1 TABLET ORAL EVERY 4 HOURS PRN
Status: DISCONTINUED | OUTPATIENT
Start: 2024-06-19 | End: 2024-06-20 | Stop reason: HOSPADM

## 2024-06-19 RX ORDER — FENTANYL CITRATE 50 UG/ML
INJECTION, SOLUTION INTRAMUSCULAR; INTRAVENOUS
Status: DISCONTINUED | OUTPATIENT
Start: 2024-06-19 | End: 2024-06-19 | Stop reason: HOSPADM

## 2024-06-19 RX ORDER — VERAPAMIL HYDROCHLORIDE 2.5 MG/ML
INJECTION, SOLUTION INTRAVENOUS
Status: DISCONTINUED | OUTPATIENT
Start: 2024-06-19 | End: 2024-06-19 | Stop reason: HOSPADM

## 2024-06-19 RX ORDER — MORPHINE SULFATE 2 MG/ML
4 INJECTION, SOLUTION INTRAMUSCULAR; INTRAVENOUS
Status: DISCONTINUED | OUTPATIENT
Start: 2024-06-19 | End: 2024-06-20 | Stop reason: HOSPADM

## 2024-06-19 RX ORDER — ACETAMINOPHEN 325 MG/1
650 TABLET ORAL EVERY 4 HOURS PRN
Status: DISCONTINUED | OUTPATIENT
Start: 2024-06-19 | End: 2024-06-20 | Stop reason: HOSPADM

## 2024-06-19 RX ADMIN — ASPIRIN 81 MG CHEWABLE TABLET 81 MG: 81 TABLET CHEWABLE at 08:50

## 2024-06-19 RX ADMIN — INSULIN LISPRO 4 UNITS: 100 INJECTION, SOLUTION INTRAVENOUS; SUBCUTANEOUS at 20:53

## 2024-06-19 RX ADMIN — ROSUVASTATIN CALCIUM 40 MG: 20 TABLET, FILM COATED ORAL at 08:50

## 2024-06-19 RX ADMIN — Medication 10 ML: at 09:59

## 2024-06-19 RX ADMIN — PANTOPRAZOLE SODIUM 40 MG: 40 TABLET, DELAYED RELEASE ORAL at 05:58

## 2024-06-19 RX ADMIN — Medication 10 ML: at 20:41

## 2024-06-19 RX ADMIN — Medication 5 MG: at 00:03

## 2024-06-19 RX ADMIN — HEPARIN SODIUM 5000 UNITS: 5000 INJECTION INTRAVENOUS; SUBCUTANEOUS at 08:50

## 2024-06-19 RX ADMIN — POTASSIUM CHLORIDE 40 MEQ: 1.5 POWDER, FOR SOLUTION ORAL at 16:59

## 2024-06-19 RX ADMIN — LEVOTHYROXINE SODIUM 25 MCG: 50 TABLET ORAL at 05:58

## 2024-06-19 RX ADMIN — POTASSIUM CHLORIDE 40 MEQ: 1.5 POWDER, FOR SOLUTION ORAL at 20:45

## 2024-06-19 RX ADMIN — SODIUM CHLORIDE 100 ML/HR: 900 INJECTION, SOLUTION INTRAVENOUS at 17:02

## 2024-06-19 RX ADMIN — VENLAFAXINE HYDROCHLORIDE 75 MG: 75 CAPSULE, EXTENDED RELEASE ORAL at 20:42

## 2024-06-19 RX ADMIN — SODIUM CHLORIDE 75 ML/HR: 900 INJECTION, SOLUTION INTRAVENOUS at 13:08

## 2024-06-19 RX ADMIN — POTASSIUM CHLORIDE 40 MEQ: 1500 TABLET, EXTENDED RELEASE ORAL at 10:09

## 2024-06-19 RX ADMIN — HEPARIN SODIUM 5000 UNITS: 5000 INJECTION INTRAVENOUS; SUBCUTANEOUS at 20:40

## 2024-06-19 NOTE — PAYOR COMM NOTE
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 mmol/L    CO2 25.9 22.0 - 29.0 mmol/L    Calcium 8.7 8.6 - 10.5 mg/dL    BUN/Creatinine Ratio 15.0 7.0 - 25.0    Anion Gap 9.1 5.0 - 15.0 mmol/L    eGFR 44.2 (L) >60.0 mL/min/1.73   CBC (No Diff)    Collection Time: 06/19/24  7:27 AM    Specimen: Blood   Result Value Ref Range    WBC 4.81 3.40 - 10.80 10*3/mm3    RBC 3.72 (L) 3.77 - 5.28 10*6/mm3    Hemoglobin 11.2 (L) 12.0 - 15.9 g/dL    Hematocrit 34.1 34.0 - 46.6 %    MCV 91.7 79.0 - 97.0 fL    MCH 30.1 26.6 - 33.0 pg    MCHC 32.8 31.5 - 35.7 g/dL    RDW 12.3 12.3 - 15.4 %    RDW-SD 41.1 37.0 - 54.0 fl    MPV 10.7 6.0 - 12.0 fL    Platelets 200 140 - 450 10*3/mm3   POC Glucose Once    Collection Time: 06/19/24  8:03 AM    Specimen: Blood   Result Value Ref Range    Glucose 123 70 - 130 mg/dL       If labs were ordered, I independently reviewed the results and considered them in treating the patient.        RADIOLOGY  XR Chest 1 View    Result Date: 6/18/2024  FINAL REPORT TECHNIQUE: SINGLE AP VIEW CHEST OF THE CHEST OBTAINED. CLINICAL HISTORY: SOA FINDINGS: COMPARISON: None   FINDINGS: The heart size is normal. The mediastinum is normal. There is no focal infiltrate or edema. There are no  pleural effusions. There is no pneumothorax. There is no osseous abnormality.     No acute cardiopulmonary process. Authenticated and Electronically Signed by Kim Romero MD on 06/18/2024 07:01:21 PM     I ordered and independently reviewed the above noted radiographic studies.      I viewed images of chest x-ray which showed no acute cardiopulmonary process per my independent interpretation.    See radiologist's dictation for official interpretation.        PROCEDURES    Procedures    ECG 12 Lead ED Triage Standing Order; SOA   Final Result          MEDICATIONS GIVEN IN ER    Medications   sodium chloride 0.9 % flush 10 mL (has no administration in time range)   Potassium Replacement - Follow Nurse / BPA Driven Protocol (has no administration in time range)   aspirin chewable tablet 81 mg (81 mg Oral Given 6/19/24 0850)   levothyroxine (SYNTHROID, LEVOTHROID) tablet 25 mcg (25 mcg Oral Given 6/19/24 0558)   rosuvastatin (CRESTOR) tablet 40 mg (40 mg Oral Given 6/19/24 0850)   pantoprazole (PROTONIX) EC tablet 40 mg (40 mg Oral Given 6/19/24 0558)   sodium chloride 0.9 % flush 10 mL (10 mL Intravenous Self Administered Via Pump 6/19/24 0959)   sodium chloride 0.9 % flush 10 mL (has no administration in time range)   sodium chloride 0.9 % infusion 40 mL (has no administration in time range)   ondansetron (ZOFRAN) injection 4 mg (has no administration in time range)   heparin (porcine) 5000 UNIT/ML injection 5,000 Units (5,000 Units Subcutaneous Given 6/19/24 0850)   nitroglycerin (NITROSTAT) SL tablet 0.4 mg (has no administration in time range)   sodium chloride 0.9 % infusion (75 mL/hr Intravenous Currently Infusing 6/19/24 0646)   Potassium Replacement - Follow Nurse / BPA Driven Protocol (has no administration in time range)   Magnesium Standard Dose Replacement - Follow Nurse / BPA Driven Protocol (has no administration in time range)   acetaminophen (TYLENOL) tablet 650 mg (has no administration in time range)      Or   acetaminophen (TYLENOL) 160 MG/5ML oral solution 650 mg (has no administration in time range)     Or   acetaminophen (TYLENOL) suppository 650 mg (has no administration in time range)   sennosides-docusate (PERICOLACE) 8.6-50 MG per tablet 2 tablet (has no administration in time range)     And   polyethylene glycol (MIRALAX) packet 17 g (has no administration in time range)     And   bisacodyl (DULCOLAX) EC tablet 5 mg (has no administration in time range)     And   bisacodyl (DULCOLAX) suppository 10 mg (has no administration in time range)   melatonin tablet 5 mg (5 mg Oral Given 6/19/24 0003)   dextrose (GLUTOSE) oral gel 15 g (has no administration in time range)   dextrose (D50W) (25 g/50 mL) IV injection 25 g (has no administration in time range)   glucagon (GLUCAGEN) injection 1 mg (has no administration in time range)   Insulin Lispro (humaLOG) injection 2-9 Units ( Subcutaneous Not Given 6/19/24 0842)   venlafaxine XR (EFFEXOR-XR) 24 hr capsule 75 mg (75 mg Oral Given 6/18/24 2234)   potassium chloride (KLOR-CON M20) CR tablet 40 mEq (40 mEq Oral Given 6/19/24 1009)   sodium chloride 0.9 % bolus 1,000 mL (0 mL Intravenous Stopped 6/19/24 0131)   potassium chloride (MICRO-K/KLOR-CON) CR capsule (40 mEq Oral Given 6/18/24 1856)         MEDICAL DECISION MAKING, PROGRESS, and CONSULTS    All labs, if obtained, have been independently reviewed by me.  All radiology studies, if obtained, have been reviewed by me and the radiologist dictating the report.  All EKG's, if obtained, have been independently viewed and interpreted by me/my attending physician.      Discussion below represents my analysis of pertinent findings related to patient's condition, differential diagnosis, treatment plan and final disposition.    73-year-old female presenting the ER for evaluation of shortness of breath and dizziness upon standing.  On exam patient is upright and alert and oriented in no acute distress.  Her vital signs  as interpreted by me show a blood pressure of 107/59, temperature 97.9 °F, heart of 62 and SpO2 97% on room air.  Patient's lungs are clear to auscultation bilaterally.  No positive HEENT findings and her belly is soft and nontender.  She has mild 1+ pitting edema of the bilateral lower extremities.  Extensive laboratory workup was initiated today.  CBC showed no leukocytosis and a normal hemoglobin and hematocrit.  CMP notable for glucose 157, and a creatinine of 1.76.  Potassium 3.0 noted.  Of note patient's creatinine had been normal 9 months ago at 0.9.  proBNP was 162.  Magnesium was normal at 2.2.  High-sensitivity troponin was 16.  EKG showed no concerning signs for ischemic changes.  Procalcitonin normal.  COVID flu negative.  Urinalysis showed some proteinuria but no other concerning findings for UTI.  Chest x-ray as interpreted by me showed no acute cardiopulmonary process and radiology overread confirmed this.  Orthostatic vital signs however showed that the patient's blood pressure dropped to 85/37 upon standing indicating orthostatic hypotension.  Given patient's obvious acute kidney injury and orthostatic hypotension I contacted hospital medicine Dr. Menjivar who agreed to admit the patient to [facility] for continued care.  Patient verbalized understanding of and agreement with today's plan of care.                     Differential diagnosis:    Differential diagnosis included but was not limited to dizziness, dehydration, acute kidney injury, UTI, pneumonia, orthostatic hypotension, electrolyte abnormality, arrhythmia, among others      Additional sources:    - Discussed/ obtained information from independent historians: Daughter at bedside in addition to patient    - External (non-ED) record review: Cardiology records from Dr. Ríos office visit on 2/21/2024    - Chronic or social conditions impacting care: None    Orders placed during this visit:  Orders Placed This Encounter    Procedures    COVID-19, FLU A/B, RSV PCR 1 HR TAT - Swab, Nasopharynx    XR Chest 1 View    US Renal Bilateral    [place] Draw    Comprehensive Metabolic Panel    BNP    Single High Sensitivity Troponin T    CBC Auto Differential    Procalcitonin    Magnesium    Urinalysis With Culture If Indicated - Urine, Clean Catch    Urinalysis, Microscopic Only - Urine, Clean Catch    Basic Metabolic Panel    CBC (No Diff)    Hemoglobin A1c    Potassium    Diet: Cardiac, Diabetic; Healthy Heart (2-3 Na+); Consistent Carbohydrate; Fluid Consistency: Thin (IDDSI 0)    Undress & Gown    Continuous Pulse Oximetry    Vital Signs    Orthostatic Vitals    Vital Signs    Up With Assistance    Intake & Output    Weigh patient    Oral Care    Maintain IV Access    Telemetry - Place Orders & Notify Provider of Results When Patient Experiences Acute Chest Pain, Dysrhythmia or Respiratory Distress    May Be Off Telemetry for Tests    Ambulate Patient    Daily Weights    Strict Intake & Output    Notify Provider (With Default Parameters)    Code Status and Medical Interventions:    Inpatient Case Management  Consult    Inpatient Cardiology Consult    OT Consult: Eval & Treat ADL Performance Below Baseline    PT Consult: Eval & Treat Functional Mobility Below Baseline    Oxygen Therapy- Nasal Cannula; Titrate 1-6 LPM Per SpO2; 90 - 95%    Incentive Spirometry    POC Glucose 4x Daily Before Meals & at Bedtime    POC Glucose Once    ECG 12 Lead ED Triage Standing Order; SOA    Insert Peripheral IV    Insert Peripheral IV    Initiate Observation Status    CBC & Differential    Green Top (Gel)    Lavender Top    Gold Top - SST    Light Blue Top         Additional orders considered but not ordered: Considered a CT pulmonary embolism study however patient is not hypoxic is having no chest pain and has no history of DVT PE therefore did not feel that PE was a likely diagnosis.      ED Course:    Consultants: Hospital medicine   [place], ED attending Dr. Peoples                Shared Decision Making:  After my consideration of clinical presentation and any laboratory/radiology studies obtained, I discussed the findings with the patient/patient representative who is in agreement with the treatment plan and the final disposition.   Risks and benefits of discharge and/or observation/admission were discussed.       AS OF 10:39 EDT VITALS:    BP - 107/59  HR - 62  TEMP - 97.9 °F (36.6 °C) (Oral)  O2 SATS - 97%                  DIAGNOSIS  Final diagnoses:   Orthostatic hypotension   Acute kidney injury         DISPOSITION  Admit to [facility]      Please note that portions of this document were completed with voice recognition software.      Shen Miranda PA-C  06/19/24 1039      Electronically signed by Shen Miranda PA-C at 06/19/24 1039       Vital Signs (last day)       Date/Time Temp Temp src Pulse Resp BP Patient Position SpO2    06/19/24 1100 98.3 (36.8) Oral 70 19 119/65 Lying 97    06/19/24 0743 97.9 (36.6) Oral 62 12 107/59 Lying 97    06/19/24 0419 97.9 (36.6) Oral 64 20 104/58 Lying 96    06/19/24 0005 98.3 (36.8) Oral 71 20 110/67 Lying 95    06/18/24 2043 98.8 (37.1) Oral 65 18 121/62 Lying 94    06/18/24 1909 -- Oral 75 18 129/67 Sitting 94    06/18/24 1900 -- -- 76 -- 132/56 -- --    06/18/24 1811 -- -- 84 -- 85/37 Standing 97    06/18/24 1810 -- -- 75 -- 113/60 Sitting 97    06/18/24 1808 -- -- 72 -- 123/61 -- 97    06/18/24 1747 -- -- 71 -- 123/69 Lying 95    06/18/24 1745 -- -- 73 -- -- -- 95    06/18/24 1730 -- -- 76 -- -- -- 98    06/18/24 1715 -- -- 78 -- -- -- 97    06/18/24 1700 -- -- 82 -- -- -- 96    06/18/24 1645 -- -- 84 -- -- -- 99    06/18/24 1642 -- -- 84 -- 105/90 -- 100    06/18/24 1640 -- -- 82 -- -- -- 99    06/18/24 1517 97.7 (36.5) Oral 94 19 122/68 Sitting 98          Current Facility-Administered Medications   Medication Dose Route Frequency Provider Last Rate Last Admin     acetaminophen (TYLENOL) tablet 650 mg  650 mg Oral Q4H PRN Quentin Menjivar MD        Or    acetaminophen (TYLENOL) 160 MG/5ML oral solution 650 mg  650 mg Oral Q4H PRN Quentin Menjivar MD        Or    acetaminophen (TYLENOL) suppository 650 mg  650 mg Rectal Q4H PRN Quentin Menjivar MD        aspirin chewable tablet 81 mg  81 mg Oral Daily Quentin Menjivar MD   81 mg at 06/19/24 0850    sennosides-docusate (PERICOLACE) 8.6-50 MG per tablet 2 tablet  2 tablet Oral BID PRN Quentin Menjivar MD        And    polyethylene glycol (MIRALAX) packet 17 g  17 g Oral Daily PRN Quentin Menjivar MD        And    bisacodyl (DULCOLAX) EC tablet 5 mg  5 mg Oral Daily PRN Quentin Menjivar MD        And    bisacodyl (DULCOLAX) suppository 10 mg  10 mg Rectal Daily PRN Quentin Menjivar MD        dextrose (D50W) (25 g/50 mL) IV injection 25 g  25 g Intravenous Q15 Min PRN Quentin Menjivar MD        dextrose (GLUTOSE) oral gel 15 g  15 g Oral Q15 Min PRN Quentin Menjivar MD        glucagon (GLUCAGEN) injection 1 mg  1 mg Intramuscular Q15 Min PRN Quentin Menjivar MD        heparin (porcine) 5000 UNIT/ML injection 5,000 Units  5,000 Units Subcutaneous Q12H Quentin Menjivar MD   5,000 Units at 06/19/24 0850    Insulin Lispro (humaLOG) injection 2-9 Units  2-9 Units Subcutaneous 4x Daily AC & at Bedtime Quentin Menjivar MD   4 Units at 06/18/24 2234    levothyroxine (SYNTHROID, LEVOTHROID) tablet 25 mcg  25 mcg Oral Daily Quentin Menjivar MD   25 mcg at 06/19/24 0558    Magnesium Standard Dose Replacement - Follow Nurse / BPA Driven Protocol   Does not apply PRN Quentin Menjivar MD        melatonin tablet 5 mg  5 mg Oral Nightly PRN Quentin Menjivar MD   5 mg at 06/19/24 0003    nitroglycerin (NITROSTAT) SL tablet 0.4 mg  0.4 mg Sublingual Q5 Min PRN Quentin Menjiavr MD        ondansetron (ZOFRAN) injection 4 mg  4 mg Intravenous Q6H PRN Quentin Menjivar MD        pantoprazole (PROTONIX) EC tablet 40 mg  40 mg Oral Q AM Quentin Menjivar,  MD   40 mg at 06/19/24 0558    potassium chloride (KLOR-CON M20) CR tablet 40 mEq  40 mEq Oral Q4H Odilia Cervantes APRN   40 mEq at 06/19/24 1009    Potassium Replacement - Follow Nurse / BPA Driven Protocol   Does not apply PRN Quentin Menjivar MD        Potassium Replacement - Follow Nurse / BPA Driven Protocol   Does not apply PRN Quentin Menjivar MD        rosuvastatin (CRESTOR) tablet 40 mg  40 mg Oral Daily Quentin Menjivar MD   40 mg at 06/19/24 0850    sodium chloride 0.9 % flush 10 mL  10 mL Intravenous PRN Quentin Menjivar MD        sodium chloride 0.9 % flush 10 mL  10 mL Intravenous Q12H Quentin Menjivar MD   10 mL at 06/19/24 0959    sodium chloride 0.9 % flush 10 mL  10 mL Intravenous PRN Quentin Menjivar MD        sodium chloride 0.9 % infusion 40 mL  40 mL Intravenous PRN Quentin Menjivar MD        sodium chloride 0.9 % infusion  75 mL/hr Intravenous Continuous Quentin Menjivar MD 75 mL/hr at 06/19/24 1048 75 mL/hr at 06/19/24 1048    venlafaxine XR (EFFEXOR-XR) 24 hr capsule 75 mg  75 mg Oral Nightly Quentin Menjivar MD   75 mg at 06/18/24 2234     Lab Results (last 24 hours)       Procedure Component Value Units Date/Time    POC Glucose 4x Daily Before Meals & at Bedtime [[id number]]  (Abnormal) Collected: 06/19/24 1123    Specimen: Blood Updated: 06/19/24 1142     Glucose 182 mg/dL      Comment: Serial Number: [id number]:  [id number]       Basic Metabolic Panel [[id number]]  (Abnormal) Collected: 06/19/24 0727    Specimen: Blood Updated: 06/19/24 0847     Glucose 118 mg/dL      BUN 19 mg/dL      Creatinine 1.27 mg/dL      Sodium 139 mmol/L      Potassium 3.0 mmol/L      Chloride 104 mmol/L      CO2 25.9 mmol/L      Calcium 8.7 mg/dL      BUN/Creatinine Ratio 15.0     Anion Gap 9.1 mmol/L      eGFR 44.2 mL/min/1.73     Narrative:      GFR Normal >60  Chronic Kidney Disease <60  Kidney Failure <15    The GFR formula is only valid for adults with stable renal function between ages 18 and 70.     CBC (No Diff) [[id number]]  (Abnormal) Collected: 06/19/24 0727    Specimen: Blood Updated: 06/19/24 0828     WBC 4.81 10*3/mm3      RBC 3.72 10*6/mm3      Hemoglobin 11.2 g/dL      Hematocrit 34.1 %      MCV 91.7 fL      MCH 30.1 pg      MCHC 32.8 g/dL      RDW 12.3 %      RDW-SD 41.1 fl      MPV 10.7 fL      Platelets 200 10*3/mm3     POC Glucose Once [[id number]]  (Normal) Collected: 06/19/24 0803    Specimen: Blood Updated: 06/19/24 0806     Glucose 123 mg/dL      Comment: Serial Number: [id number]:  [id number]       POC Glucose 4x Daily Before Meals & at Bedtime [[id number]]  (Abnormal) Collected: 06/18/24 2209    Specimen: Blood Updated: 06/18/24 2211     Glucose 233 mg/dL      Comment: Serial Number: [id number]:  [id number]       Hemoglobin A1c [[id number]]  (Abnormal) Collected: 06/18/24 1637    Specimen: Blood Updated: 06/18/24 2123     Hemoglobin A1C 8.30 %     Narrative:      Hemoglobin A1C Ranges:    Increased Risk for Diabetes  5.7% to 6.4%  Diabetes                     >= 6.5%  Diabetic Goal                < 7.0%    COVID-19, FLU A/B, RSV PCR 1 HR TAT - Swab, Nasopharynx [[id number]]  (Normal) Collected: 06/18/24 1642    Specimen: Swab from Nasopharynx Updated: 06/18/24 1723     COVID19 Not Detected     Influenza A PCR Not Detected     Influenza B PCR Not Detected     RSV, PCR Not Detected    Narrative:      Fact sheet for providers: https://www.fda.gov/media/158311/download    Fact sheet for patients: https://www.fda.gov/media/307623/download    Test performed by PCR.    Urinalysis, Microscopic Only - Urine, Clean Catch [[id number]]  (Abnormal) Collected: 06/18/24 1651    Specimen: Urine, Clean Catch Updated: 06/18/24 1719     RBC, UA 3-5 /HPF      WBC, UA None Seen /HPF      Comment: Urine culture not indicated.        Bacteria, UA Trace /HPF      Squamous Epithelial Cells, UA 3-6 /HPF      Hyaline Casts, UA 7-12 /LPF      Methodology Manual Light Microscopy    Procalcitonin [[id number]]  (Normal)  "Collected: 06/18/24 1637    Specimen: Blood Updated: 06/18/24 1719     Procalcitonin 0.13 ng/mL     Narrative:      As a Marker for Sepsis (Non-Neonates):    1. <0.5 ng/mL represents a low risk of severe sepsis and/or septic shock.  2. >2 ng/mL represents a high risk of severe sepsis and/or septic shock.    As a Marker for Lower Respiratory Tract Infections that require antibiotic therapy:    PCT on Admission    Antibiotic Therapy       6-12 Hrs later    >0.5                Strongly Recommended  >0.25 - <0.5        Recommended   0.1 - 0.25          Discouraged              Remeasure/reassess PCT  <0.1                Strongly Discouraged     Remeasure/reassess PCT    As 28 day mortality risk marker: \"Change in Procalcitonin Result\" (>80% or <=80%) if Day 0 (or Day 1) and Day 4 values are available. Refer to http://www.zwoor.com-pct-calculator.com    Change in PCT <=80%  A decrease of PCT levels below or equal to 80% defines a positive change in PCT test result representing a higher risk for 28-day all-cause mortality of patients diagnosed with severe sepsis for septic shock.    Change in PCT >80%  A decrease of PCT levels of more than 80% defines a negative change in PCT result representing a lower risk for 28-day all-cause mortality of patients diagnosed with severe sepsis or septic shock.       BNP [[id number]]  (Normal) Collected: 06/18/24 1637    Specimen: Blood Updated: 06/18/24 1712     proBNP 162.5 pg/mL     Narrative:      This assay is used as an aid in the diagnosis of individuals suspected of having heart failure. It can be used as an aid in the diagnosis of acute decompensated heart failure (ADHF) in patients presenting with signs and symptoms of ADHF to the emergency department (ED). In addition, NT-proBNP of <300 pg/mL indicates ADHF is not likely.    Age Range Result Interpretation  NT-proBNP Concentration (pg/mL:      <50             Positive            >450                   [place]                 " 300-450                    Negative             <300    50-75           Positive            >900                  Gray                300-900                  Negative            <300      >75             Positive            >1800                  Gray                300-1800                  Negative            <300    Single High Sensitivity Troponin T [[id number]]  (Abnormal) Collected: 06/18/24 1637    Specimen: Blood Updated: 06/18/24 1712     HS Troponin T 16 ng/L     Narrative:      High Sensitive Troponin T Reference Range:  <14.0 ng/L- Negative Female for AMI  <22.0 ng/L- Negative Male for AMI  >=14 - Abnormal Female indicating possible myocardial injury.  >=22 - Abnormal Male indicating possible myocardial injury.   Clinicians would have to utilize clinical acumen, EKG, Troponin, and serial changes to determine if it is an Acute Myocardial Infarction or myocardial injury due to an underlying chronic condition.         Comprehensive Metabolic Panel [[id number]]  (Abnormal) Collected: 06/18/24 1637    Specimen: Blood Updated: 06/18/24 1709     Glucose 157 mg/dL      BUN 21 mg/dL      Creatinine 1.76 mg/dL      Sodium 137 mmol/L      Potassium 3.0 mmol/L      Chloride 95 mmol/L      CO2 27.2 mmol/L      Calcium 9.8 mg/dL      Total Protein 7.2 g/dL      Albumin 4.4 g/dL      ALT (SGPT) 16 U/L      AST (SGOT) 24 U/L      Alkaline Phosphatase 47 U/L      Total Bilirubin 1.0 mg/dL      Globulin 2.8 gm/dL      A/G Ratio 1.6 g/dL      BUN/Creatinine Ratio 11.9     Anion Gap 14.8 mmol/L      eGFR 29.9 mL/min/1.73     Narrative:      GFR Normal >60  Chronic Kidney Disease <60  Kidney Failure <15    The GFR formula is only valid for adults with stable renal function between ages 18 and 70.    Magnesium [[id number]]  (Normal) Collected: 06/18/24 1637    Specimen: Blood Updated: 06/18/24 1709     Magnesium 2.2 mg/dL     Urinalysis With Culture If Indicated - Urine, Clean Catch [[id number]]  (Abnormal) Collected: 06/18/24  1651    Specimen: Urine, Clean Catch Updated: 06/18/24 1707     Color, UA Yellow     Appearance, UA Cloudy     pH, UA 5.5     Specific Gravity, UA 1.010     Glucose, UA Negative     Ketones, UA Negative     Bilirubin, UA Negative     Blood, UA Small (1+)     Protein,  mg/dL (2+)     Leuk Esterase, UA Negative     Nitrite, UA Negative     Urobilinogen, UA 0.2 E.U./dL    Narrative:      In absence of clinical symptoms, the presence of pyuria, bacteria, and/or nitrites on the urinalysis result does not correlate with infection.    CBC & Differential [[id number]]  (Abnormal) Collected: 06/18/24 1637    Specimen: Blood Updated: 06/18/24 1649    Narrative:      The following orders were created for panel order CBC & Differential.  Procedure                               Abnormality         Status                     ---------                               -----------         ------                     CBC Auto Differential[[id number]]        Abnormal            Final result                 Please view results for these tests on the individual orders.    CBC Auto Differential [[id number]]  (Abnormal) Collected: 06/18/24 1637    Specimen: Blood Updated: 06/18/24 1649     WBC 7.46 10*3/mm3      RBC 4.38 10*6/mm3      Hemoglobin 13.3 g/dL      Hematocrit 38.7 %      MCV 88.4 fL      MCH 30.4 pg      MCHC 34.4 g/dL      RDW 12.0 %      RDW-SD 38.9 fl      MPV 10.3 fL      Platelets 262 10*3/mm3      Neutrophil % 49.7 %      Lymphocyte % 39.8 %      Monocyte % 8.2 %      Eosinophil % 1.3 %      Basophil % 0.7 %      Immature Grans % 0.3 %      Neutrophils, Absolute 3.71 10*3/mm3      Lymphocytes, Absolute 2.97 10*3/mm3      Monocytes, Absolute 0.61 10*3/mm3      Eosinophils, Absolute 0.10 10*3/mm3      Basophils, Absolute 0.05 10*3/mm3      Immature Grans, Absolute 0.02 10*3/mm3      nRBC 0.0 /100 WBC     [place] Draw [[id number]] Collected: 06/18/24 1637    Specimen: Blood Updated: 06/18/24 1645    Narrative:      The  following orders were created for panel order [place] Draw.  Procedure                               Abnormality         Status                     ---------                               -----------         ------                     Green Top (Gel)[[id number]]                                  Final result               Lavender Top[[id number]]                                     Final result               Gold Top - SST[[id number]]                                   Final result               Light Blue Top[[id number]]                                   Final result                 Please view results for these tests on the individual orders.    Green Top (Gel) [[id number]] Collected: 06/18/24 1637    Specimen: Blood Updated: 06/18/24 1645     Extra Tube Hold for add-ons.     Comment: Auto resulted.       Lavender Top [[id number]] Collected: 06/18/24 1637    Specimen: Blood Updated: 06/18/24 1645     Extra Tube hold for add-on     Comment: Auto resulted       Gold Top - SST [[id number]] Collected: 06/18/24 1637    Specimen: Blood Updated: 06/18/24 1645     Extra Tube Hold for add-ons.     Comment: Auto resulted.       Light Blue Top [[id number]] Collected: 06/18/24 1637    Specimen: Blood Updated: 06/18/24 1645     Extra Tube Hold for add-ons.     Comment: Auto resulted             Imaging Results (Last 24 Hours)       Procedure Component Value Units Date/Time    US Renal Bilateral [[id number]] Collected: 06/19/24 1114     Updated: 06/19/24 1114    Narrative: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

## 2024-06-19 NOTE — CASE MANAGEMENT/SOCIAL WORK
Discharge Planning Assessment  [facility]     Patient Name: Latonya Mei  MRN: [id number]  Today's Date: 6/19/2024    Admit Date: 6/18/2024    Plan: Home with family   Discharge Needs Assessment       Row Name 06/19/24 1032       Living Environment    People in Home spouse;sibling(s)    Current Living Arrangements home    Potentially Unsafe Housing Conditions none    In the past 12 months has the electric, gas, oil, or water company threatened to shut off services in your home? No    Primary Care Provided by self    Provides Primary Care For no one    Able to Return to Prior Arrangements yes       Resource/Environmental Concerns    Resource/Environmental Concerns none    Transportation Concerns none       Transportation Needs    In the past 12 months, has lack of transportation kept you from medical appointments or from getting medications? no    In the past 12 months, has lack of transportation kept you from meetings, work, or from getting things needed for daily living? No       Food Insecurity    Within the past 12 months, you worried that your food would run out before you got the money to buy more. Never true    Within the past 12 months, the food you bought just didn't last and you didn't have money to get more. Never true       Transition Planning    Patient/Family Anticipates Transition to home with family    Patient/Family Anticipated Services at Transition none    Transportation Anticipated family or friend will provide       Discharge Needs Assessment    Readmission Within the Last 30 Days no previous admission in last 30 days    Equipment Currently Used at Home none    Concerns to be Addressed no discharge needs identified    Anticipated Changes Related to Illness none    Equipment Needed After Discharge none                   Discharge Plan       Row Name 06/19/24 1032       Plan    Plan Home with family    Patient/Family in Agreement with Plan yes    Plan Comments Met with patient at  bedside.Verified patient's address, phone number, contacts, physician and pharmacy. Patient has adequate transportation. Reports no financial or food insecurity. Patient lives with her spouse and her sister who she provides care for. Uses [facility], agreeable to meds to bed. Patient does not use DME. States she has no needs at this time. Plans to return home once medically ready.    Final Discharge Disposition Code 01 - home or self-care                  Continued Care and Services - Admitted Since 6/18/2024    No active coordination exists for this encounter.          Demographic Summary       Row Name 06/19/24 1029       General Information    Admission Type observation    Arrived From emergency department    Required Notices Provided Observation Status Notice    Referral Source admission list    Reason for Consult discharge planning    Preferred Language English       Contact Information    Permission Granted to Share Info With                    Functional Status       Row Name 06/19/24 1031       Functional Status    Usual Activity Tolerance excellent    Current Activity Tolerance excellent       Assessment of Health Literacy    How often do you have someone help you read hospital materials? Never    How often do you have problems learning about your medical condition because of difficulty understanding written information? Never    How often do you have a problem understanding what is told to you about your medical condition? Never    How confident are you filling out medical forms by yourself? Extremely    Health Literacy Excellent       Functional Status, IADL    Medications independent    Meal Preparation independent    Housekeeping independent    Laundry independent    Shopping independent                   Psychosocial       Row Name 06/19/24 1032       Values/Beliefs    Spiritual, Cultural Beliefs, Sabianist Practices, Values that Affect Care no       Mental Health    Little Interest or  Pleasure in Doing Things 0-->not at all    Feeling Down, Depressed or Hopeless 0-->not at all       Stress    Do you feel stress - tense, restless, nervous, or anxious, or unable to sleep at night because your mind is troubled all the time - these days? Not at all       Coping/Stress    Major Change/Loss/Stressor illness    Patient Personal Strengths able to adapt;resilient    Techniques to [place] with Loss/Stress/Change diversional activities    Reaction to Health Status accepting    Understanding of Condition and Treatment adequate understanding of medical condition;adequate understanding of treatment       Developmental Stage (Eriksson's)    Developmental Stage Stage 8 (65 years-death/Late Adulthood) Integrity vs. Despair       C-SSRS (Recent)    Q1 Wished to be Dead (Past Month) no    Q2 Suicidal Thoughts (Past Month) no    Q6 Suicide Behavior (Lifetime) no       Violence Risk    Feels Like Hurting Others no    Previous Attempt to Harm Others no                   Abuse/Neglect    No documentation.                  Legal    No documentation.                  Substance Abuse    No documentation.                  Patient Forms    No documentation.                     Santosh Hernandez RN
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
## 2024-06-19 NOTE — PROGRESS NOTES
[facility]    Name:  Latonya Mei   Age:  73 y.o.  Sex:  female  :  1950  MRN:  [id number]   Visit Number:  [id number]  Admission Date:  2024  Date Of Service:  24  Primary Care Physician:  Chrystal Buckley APRN     LOS: 0 days :    Chief Complaint:      Shortness of air/generalized weakness/dyspnea    Subjective:    Patient seen with son at bedside.  States currently he is caregiver for her  and sister who is blind.  Patient shortness of air onset since February.  Shortness of air making activities of daily living difficult for patient.  States unable to walk and get mail without shortness of air.  Son at bedside states normally very active and has not been able to be herself lately.  Does have intermittent chest pain and bilateral lower extremity edema as well.  Also complains of associated dizziness.    Hospital Course:    Patient is 73 years old female with a past medical history of diabetes mellitus type 2, hypertension, hyperlipidemia, GERD, fibromyalgia who presented to the ER with a chief complaint of generalized weakness, lightheadedness and dyspnea on exertion.  Patient reported that she has been having symptoms of lightheadedness and feeling short of air on any minimal exertion over the past few months, her symptoms have been worsening over the past couple weeks. She contacted her cardiologist Dr. Ríos around 10 days ago, Dr. Roís recommended increasing her metoprolol XL to 100 mg and was placed on 40 mg of Lasix daily along with potassium chloride.  She reported that her symptoms did not get any better and were worsening. She also endorsed feeling dizzy on standing.   She denied any nausea or vomiting or abdominal pain.  Patient has been treated for UTI for above symptoms in the past as well.  States drinks 3-4 bottles of water per day.  Denied associated fever or urinary symptoms.  Denied other respiratory symptoms such  as cough, sputum production, or wheezing.  Does have history of tobacco exposure in the past.     On ER evaluation, Her vitals were stable and was afebrile on room air.  Patient was orthostatic and dropped down to 80s over 30s on standing.  Her workup was significant for HS Troponin of 16, potassium 3.0, creatinine 1.76 (baseline creatinine 0.9).  Her other CBC and CMP within acceptable range.  Urinalysis negative for infection.  COVID and flu negative.  Chest x-ray with no acute cardiopulmonary process.  Patient received 1 L bolus of normal saline and potassium chloride 40 mEq p.o. x 1 while in the ER.  Hospitalist consulted for admission, further management and treatment.  Acute kidney injury improved with fluids.  Given ongoing shortness of air, dizziness, with intermittent chest pain cardiology Dr. Ríos consulted.  Dr. Ríos suggested repeating echo with coronary angiography given above symptoms.  Patient also to have bilateral arterial upper extremity ultrasound.    Review of Systems:     All systems were reviewed and negative except as mentioned in subjective, assessment and plan.    Vital Signs:    Temp:  [97.7 °F (36.5 °C)-98.8 °F (37.1 °C)] 97.9 °F (36.6 °C)  Heart Rate:  [62-94] 62  Resp:  [12-20] 12  BP: ()/(37-90) 107/59    Intake and output:    I/O last 3 completed shifts:  In: 837 [P.O.:240; I.V.:597]  Out: 350 [Urine:350]  I/O this shift:  In: 240 [P.O.:240]  Out: -     Physical Examination:    General Appearance:  Alert and cooperative.  Chronically ill middle-aged female.   Head:  Atraumatic and normocephalic.   Eyes: Conjunctivae and sclerae normal, no icterus. No pallor.   Throat: No oral lesions, no thrush, oral mucosa moist.   Neck: Supple, trachea midline, no thyromegaly.   Lungs:   Breath sounds heard bilaterally equally.  No wheezing or crackles. No Pleural rub or bronchial breathing.  Unlabored on room air.   Heart:  Normal S1 and S2, no murmur, no gallop, no rub. No JVD.  "  Abdomen:   Normal bowel sounds, no masses, no organomegaly. Soft, nontender, nondistended, no rebound tenderness.   Extremities: Supple, no edema, no cyanosis, no clubbing.   Skin: No bleeding or rash.   Neurologic: Alert and oriented x 3. No facial asymmetry. Moves all four limbs. No tremors.  Generalized weakness.     Laboratory results:    Results from last 7 days   Lab Units 06/19/24  0727 06/18/24  1637   SODIUM mmol/L 139 137   POTASSIUM mmol/L 3.0* 3.0*   CHLORIDE mmol/L 104 95*   CO2 mmol/L 25.9 27.2   BUN mg/dL 19 21   CREATININE mg/dL 1.27* 1.76*   CALCIUM mg/dL 8.7 9.8   BILIRUBIN mg/dL  --  1.0   ALK PHOS U/L  --  47   ALT (SGPT) U/L  --  16   AST (SGOT) U/L  --  24   GLUCOSE mg/dL 118* 157*     Results from last 7 days   Lab Units 06/19/24  0727 06/18/24  1637   WBC 10*3/mm3 4.81 7.46   HEMOGLOBIN g/dL 11.2* 13.3   HEMATOCRIT % 34.1 38.7   PLATELETS 10*3/mm3 200 262         Results from last 7 days   Lab Units 06/18/24  1637   HSTROP T ng/L 16*         No results for input(s): \"PHART\", \"XDW8BTO\", \"PO2ART\", \"QIU8MLT\", \"BASEEXCESS\" in the last 8760 hours.   I have reviewed the patient's laboratory results.    Radiology results:    XR Chest 1 View    Result Date: 6/18/2024  FINAL REPORT TECHNIQUE: SINGLE AP VIEW CHEST OF THE CHEST OBTAINED. CLINICAL HISTORY: SOA FINDINGS: COMPARISON: None   FINDINGS: The heart size is normal. The mediastinum is normal. There is no focal infiltrate or edema. There are no pleural effusions. There is no pneumothorax. There is no osseous abnormality.     Impression: No acute cardiopulmonary process. Authenticated and Electronically Signed by Kim Romero MD on 06/18/2024 07:01:21 PM   I have reviewed the patient's radiology reports.    Medication Review:     I have reviewed the patient's active and prn medications.     Problem List:      Orthostatic hypotension      Assessment:    Acute kidney injury, POA  Orthostatic hypotension, POA  Hypokalemia, POA  Dyspnea, " POA  Diabetes mellitus type 2  Hypertension  Hyperlipidemia  GERD  Fibromyalgia    Plan:    Acute kidney injury  -Likely due to dehydration and recent use of Lasix.  -Renal ultrasound noting left renal cyst without obstruction.  -Avoid nephrotoxic drugs.  -Continue gentle IV fluids at 75 mL/hr.  -Will hold Lasix for now.  -Monitor kidney function     Orthostatic hypotension/dyspnea  -With the recent increase in her metoprolol dose and Lasix  -Received IV fluid bolus in the ER, will continue with maintenance IV fluids  -She had dyspnea on exertion since her last visit to cardiology back in February 2024, she declined referral to pulmonologist at that time.  -Most recent 2D echo on file from January 2024 with EF of 66%. She had a stress test in February 2024 with no evidence of ischemia and low risk study.    -Will hold metoprolol now   -PT/OT eval.  -Discussed with Dr. Ríos.  Given worsening symptoms patient offered cardiac catheterization with risk and benefits discussed including possible worsening acute kidney injury.  Repeat echo.  Bilateral upper extremity ultrasounds pending.    Hypokalemia  -Replace per protocol     -Continue home meds as warranted.    I have reviewed the copied text and it is accurate as of 6/19/2024      DVT Prophylaxis: Heparin prophylaxis   Code Status: Full  Diet: Cardiac/carbohydrate consistent  Discharge Plan: Likely home in 1 to 2 days.    Odilia Cervantes, APRN  06/19/24  11:13 EDT    Dictated utilizing Dragon dictation.

## 2024-06-19 NOTE — PLAN OF CARE
Goal Outcome Evaluation:  Plan of Care Reviewed With: patient        Progress: no change  Outcome Evaluation: VSS, no acute events to report.

## 2024-06-20 VITALS
TEMPERATURE: 97.9 F | BODY MASS INDEX: 25.58 KG/M2 | DIASTOLIC BLOOD PRESSURE: 69 MMHG | HEART RATE: 90 BPM | SYSTOLIC BLOOD PRESSURE: 128 MMHG | OXYGEN SATURATION: 95 % | HEIGHT: 66 IN | RESPIRATION RATE: 18 BRPM | WEIGHT: 159.17 LBS

## 2024-06-20 LAB
ANION GAP SERPL CALCULATED.3IONS-SCNC: 8.8 MMOL/L (ref 5–15)
BASOPHILS # BLD AUTO: 0.04 10*3/MM3 (ref 0–0.2)
BASOPHILS NFR BLD AUTO: 0.8 % (ref 0–1.5)
BUN SERPL-MCNC: 13 MG/DL (ref 8–23)
BUN/CREAT SERPL: 11 (ref 7–25)
CALCIUM SPEC-SCNC: 8.9 MG/DL (ref 8.6–10.5)
CHLORIDE SERPL-SCNC: 108 MMOL/L (ref 98–107)
CO2 SERPL-SCNC: 22.2 MMOL/L (ref 22–29)
CREAT SERPL-MCNC: 1.18 MG/DL (ref 0.57–1)
DEPRECATED RDW RBC AUTO: 41 FL (ref 37–54)
EGFRCR SERPLBLD CKD-EPI 2021: 48.3 ML/MIN/1.73
EOSINOPHIL # BLD AUTO: 0.15 10*3/MM3 (ref 0–0.4)
EOSINOPHIL NFR BLD AUTO: 3 % (ref 0.3–6.2)
ERYTHROCYTE [DISTWIDTH] IN BLOOD BY AUTOMATED COUNT: 12 % (ref 12.3–15.4)
GLUCOSE BLDC GLUCOMTR-MCNC: 118 MG/DL (ref 70–130)
GLUCOSE BLDC GLUCOMTR-MCNC: 165 MG/DL (ref 70–130)
GLUCOSE SERPL-MCNC: 123 MG/DL (ref 65–99)
HCT VFR BLD AUTO: 34.2 % (ref 34–46.6)
HGB BLD-MCNC: 11.2 G/DL (ref 12–15.9)
IMM GRANULOCYTES # BLD AUTO: 0.02 10*3/MM3 (ref 0–0.05)
IMM GRANULOCYTES NFR BLD AUTO: 0.4 % (ref 0–0.5)
LYMPHOCYTES # BLD AUTO: 2.1 10*3/MM3 (ref 0.7–3.1)
LYMPHOCYTES NFR BLD AUTO: 42.3 % (ref 19.6–45.3)
MCH RBC QN AUTO: 30.4 PG (ref 26.6–33)
MCHC RBC AUTO-ENTMCNC: 32.7 G/DL (ref 31.5–35.7)
MCV RBC AUTO: 92.7 FL (ref 79–97)
MONOCYTES # BLD AUTO: 0.42 10*3/MM3 (ref 0.1–0.9)
MONOCYTES NFR BLD AUTO: 8.5 % (ref 5–12)
NEUTROPHILS NFR BLD AUTO: 2.23 10*3/MM3 (ref 1.7–7)
NEUTROPHILS NFR BLD AUTO: 45 % (ref 42.7–76)
NRBC BLD AUTO-RTO: 0 /100 WBC (ref 0–0.2)
PLATELET # BLD AUTO: 174 10*3/MM3 (ref 140–450)
PMV BLD AUTO: 10.1 FL (ref 6–12)
POTASSIUM SERPL-SCNC: 4 MMOL/L (ref 3.5–5.2)
RBC # BLD AUTO: 3.69 10*6/MM3 (ref 3.77–5.28)
SODIUM SERPL-SCNC: 139 MMOL/L (ref 136–145)
WBC NRBC COR # BLD AUTO: 4.96 10*3/MM3 (ref 3.4–10.8)

## 2024-06-20 PROCEDURE — 99239 HOSP IP/OBS DSCHRG MGMT >30: CPT | Performed by: NURSE PRACTITIONER

## 2024-06-20 PROCEDURE — 63710000001 ASPIRIN 81 MG CHEWABLE TABLET: Performed by: INTERNAL MEDICINE

## 2024-06-20 PROCEDURE — 85025 COMPLETE CBC W/AUTO DIFF WBC: CPT | Performed by: INTERNAL MEDICINE

## 2024-06-20 PROCEDURE — G0378 HOSPITAL OBSERVATION PER HR: HCPCS

## 2024-06-20 PROCEDURE — 25810000003 SODIUM CHLORIDE 0.9 % SOLUTION: Performed by: INTERNAL MEDICINE

## 2024-06-20 PROCEDURE — 82948 REAGENT STRIP/BLOOD GLUCOSE: CPT | Performed by: INTERNAL MEDICINE

## 2024-06-20 PROCEDURE — A9270 NON-COVERED ITEM OR SERVICE: HCPCS | Performed by: INTERNAL MEDICINE

## 2024-06-20 PROCEDURE — 63710000001 ROSUVASTATIN 20 MG TABLET: Performed by: INTERNAL MEDICINE

## 2024-06-20 PROCEDURE — 97161 PT EVAL LOW COMPLEX 20 MIN: CPT

## 2024-06-20 PROCEDURE — 63710000001 LEVOTHYROXINE 50 MCG TABLET: Performed by: INTERNAL MEDICINE

## 2024-06-20 PROCEDURE — 63710000001 PANTOPRAZOLE 40 MG TABLET DELAYED-RELEASE: Performed by: INTERNAL MEDICINE

## 2024-06-20 PROCEDURE — 25010000002 HEPARIN (PORCINE) PER 1000 UNITS: Performed by: INTERNAL MEDICINE

## 2024-06-20 PROCEDURE — 97165 OT EVAL LOW COMPLEX 30 MIN: CPT

## 2024-06-20 PROCEDURE — 63710000001 INSULIN LISPRO (HUMAN) PER 5 UNITS: Performed by: INTERNAL MEDICINE

## 2024-06-20 PROCEDURE — 80048 BASIC METABOLIC PNL TOTAL CA: CPT | Performed by: INTERNAL MEDICINE

## 2024-06-20 RX ORDER — ALBUTEROL SULFATE 90 UG/1
2 AEROSOL, METERED RESPIRATORY (INHALATION) EVERY 4 HOURS PRN
Qty: 8.5 G | Refills: 0 | Status: SHIPPED | OUTPATIENT
Start: 2024-06-20

## 2024-06-20 RX ADMIN — INSULIN LISPRO 2 UNITS: 100 INJECTION, SOLUTION INTRAVENOUS; SUBCUTANEOUS at 12:02

## 2024-06-20 RX ADMIN — SODIUM CHLORIDE 75 ML/HR: 900 INJECTION, SOLUTION INTRAVENOUS at 05:18

## 2024-06-20 RX ADMIN — ASPIRIN 81 MG CHEWABLE TABLET 81 MG: 81 TABLET CHEWABLE at 08:08

## 2024-06-20 RX ADMIN — ROSUVASTATIN CALCIUM 40 MG: 20 TABLET, FILM COATED ORAL at 08:07

## 2024-06-20 RX ADMIN — HEPARIN SODIUM 5000 UNITS: 5000 INJECTION INTRAVENOUS; SUBCUTANEOUS at 08:28

## 2024-06-20 RX ADMIN — Medication 10 ML: at 08:07

## 2024-06-20 RX ADMIN — PANTOPRAZOLE SODIUM 40 MG: 40 TABLET, DELAYED RELEASE ORAL at 05:19

## 2024-06-20 RX ADMIN — LEVOTHYROXINE SODIUM 25 MCG: 50 TABLET ORAL at 05:19

## 2024-06-20 NOTE — CASE MANAGEMENT/SOCIAL WORK
Case Management/Social Work    Patient Name:  Latonya Mei  YOB: 1950  MRN: [id number]  Admit Date:  6/18/2024        10:54 EDT  Met with patient at bedside. Patient wants to discharge home once medically ready. CM will continue to follow.      Electronically signed by:  Santosh Hernandez RN  06/20/24 10:54 EDT

## 2024-06-20 NOTE — CASE MANAGEMENT/SOCIAL WORK
Case Management Discharge Note                Selected Continued Care - Admitted Since 6/18/2024       Destination    No services have been selected for the patient.                Durable Medical Equipment    No services have been selected for the patient.                Dialysis/Infusion    No services have been selected for the patient.                Home Medical Care    No services have been selected for the patient.                Therapy    No services have been selected for the patient.                Community Resources    No services have been selected for the patient.                [place] [facility] [place] have been [place] for the patient.                    Transportation Services  Private: Car    Final Discharge Disposition Code: 01 - home or self-care   No

## 2024-06-20 NOTE — DISCHARGE INSTRUCTIONS
Patient to be discharged home.  Follow with PCP in 3-5 days.  Hold Potassium-lasix-metoprolol.  Albuterol PRN.  Hold Metformin x 2 further days due to recent contrast with heart cath.  Follow with Pulmonology as scheduled.  Return to ED for worsening symptoms.

## 2024-06-20 NOTE — PLAN OF CARE
Goal Outcome Evaluation:  Plan of Care Reviewed With: patient        Progress: improving  Outcome Evaluation: OT eval completed. Patient is supine, no pain, Ox4. Lives with spouse and sister who she cares for. Ramp entry, one level. No DME. Patient is ind with all mobility, ADLs, IADLs and driving. Performed supine to sit modified ind, no change in BP noted. Performed sit to stand sup and walked 450' no AD sup. Patient is ind -sup with ADLs. No DC concerns.      Anticipated Discharge Disposition (OT): home, home with assist                         no
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
## 2024-06-20 NOTE — DISCHARGE SUMMARY
[facility]   DISCHARGE SUMMARY      Name:  Latonya Mei   Age:  73 y.o.  Sex:  female  :  1950  MRN:  [id number]   Visit Number:  [id number]    Admission Date:  2024  Date of Discharge:  2024  Primary Care Physician:  Chrystal Buckley APRN    Important issues to note:    -Patient admitted for acute kidney injury which resolved with supportive care/fluids/nephrotoxic medications held.  Dizziness/orthostatic hypotension also resolved with fluids.  -Lasix/metoprolol/potassium discontinued.  -Given worsening dyspnea on exertion with normal echo cardiology recommended cardiac catheterization which did not note any focal obstruction.  Noted trace/mild aortic regurgitation/recommended pulmonology evaluation.  -Otherwise reassuring labs vitals noted.  Patient agreeable to follow-up outpatient for pulmonology evaluation.  -Albuterol inhaler given for shortness of air as needed.  -Strict return precautions given.  -Advised to hold metformin x 2 days given recent contrast.    Discharge Diagnoses:     Acute kidney injury, POA  Orthostatic hypotension, POA  Hypokalemia, POA  Dyspnea on exertion, POA  Diabetes mellitus type 2  Hypertension  Hyperlipidemia  GERD  Fibromyalgia    Problem List:     Active Hospital Problems    Diagnosis  POA    **Orthostatic hypotension [I95.1]  Yes    IBARRA (dyspnea on exertion) [R06.09]  Yes      Resolved Hospital Problems   No resolved problems to display.     Presenting Problem:    Chief Complaint   Patient presents with    Shortness of Breath      Consults:     Consulting Physician(s)                     None              Procedures Performed:    Procedure(s):  Coronary angiography    History of presenting illness/Hospital Course:    Patient is 73 years old female with a past medical history of diabetes mellitus type 2, hypertension, hyperlipidemia, GERD, fibromyalgia who presented to the ER with a chief complaint of generalized weakness,  lightheadedness and dyspnea on exertion.  Patient reported that she has been having symptoms of lightheadedness and feeling short of air on any minimal exertion over the past few months, her symptoms have been worsening over the past couple weeks. She contacted her cardiologist Dr. Ríos around 10 days ago, Dr. Ríos recommended increasing her metoprolol XL to 100 mg and was placed on 40 mg of Lasix daily along with potassium chloride.  She reported that her symptoms did not get any better and were worsening. She also endorsed feeling dizzy on standing.   She denied any nausea or vomiting or abdominal pain.  Patient has been treated for UTI for above symptoms in the past as well.  States drinks 3-4 bottles of water per day.  Denied associated fever or urinary symptoms.  Denied other respiratory symptoms such as cough, sputum production, or wheezing.  Does have history of tobacco exposure in the past as well as working in a factory.  Possible asthma as a child.     On ER evaluation, Her vitals were stable and was afebrile on room air.  Patient was orthostatic and dropped down to 80s over 30s on standing.  Her workup was significant for HS Troponin of 16, potassium 3.0, creatinine 1.76 (baseline creatinine 0.9).  Her other CBC and CMP within acceptable range.  Urinalysis negative for infection.  COVID and flu negative.  Chest x-ray with no acute cardiopulmonary process.  Patient received 1 L bolus of normal saline and potassium chloride 40 mEq p.o. x 1 while in the ER.  Hospitalist consulted for admission, further management and treatment.  Acute kidney injury improved with fluids.  Given ongoing shortness of air, dizziness, with intermittent chest pain cardiology Dr. Ríos consulted.  Dr. Ríos suggested repeating echo with coronary angiography given above symptoms.  Patient also to have bilateral arterial upper extremity ultrasound.  Workup otherwise negative.  Repeat echo noted preserved EF with  moderate aortic valve regurgitation.  Bilateral upper extremity arterial Dopplers negative without significant stenosis.  Cardiac cath noted no focal obstructive coronary artery disease.  Cardiology recommended pulmonology evaluation as echo did note evidence of right ventricular enlargement.  Metoprolol and Lasix discontinued.  Patient otherwise with reassuring labs and vitals noted.  She is agreeable for outpatient pulmonology referral.  She has remained on room air during hospitalization.  Dizziness has improved.  PT evaluation pending upon discharge.  Strict return precautions given.    Vital Signs:    Temp:  [97.7 °F (36.5 °C)-98.3 °F (36.8 °C)] 97.9 °F (36.6 °C)  Heart Rate:  [64-93] 72  Resp:  [12-21] 12  BP: ()/(51-72) 130/68    Physical Exam:    General Appearance:  Alert and cooperative.  Chronically ill middle-aged female.   Head:  Atraumatic and normocephalic.   Eyes: Conjunctivae and sclerae normal, no icterus. No pallor.   Ears:  Ears with no abnormalities noted.   Throat: No oral lesions, no thrush, oral mucosa moist.   Neck: Supple, trachea midline, no thyromegaly.   Back:   No kyphoscoliosis present. No tenderness to palpation.   Lungs:   Breath sounds heard bilaterally equally.  No crackles or wheezing. No Pleural rub or bronchial breathing.  Unlabored on room air.   Heart:  Normal S1 and S2, no murmur, no gallop, no rub. No JVD.   Abdomen:   Normal bowel sounds, no masses, no organomegaly. Soft, nontender, nondistended, no rebound tenderness.   Extremities: Supple, no edema, no cyanosis, no clubbing.   Pulses: Pulses palpable bilaterally.   Skin: No bleeding or rash.   Neurologic: Alert and oriented x 3. No facial asymmetry. Moves all four limbs. No tremors.     Pertinent Lab Results:     Results from last 7 days   Lab Units 06/20/24  0608 06/19/24  2300 06/19/24  0727 06/18/24  1637   SODIUM mmol/L 139  --  139 137   POTASSIUM mmol/L 4.0 4.3 3.0* 3.0*   CHLORIDE mmol/L 108*  --  104 95*    CO2 mmol/L 22.2  --  25.9 27.2   BUN mg/dL 13  --  19 21   CREATININE mg/dL 1.18*  --  1.27* 1.76*   CALCIUM mg/dL 8.9  --  8.7 9.8   BILIRUBIN mg/dL  --   --   --  1.0   ALK PHOS U/L  --   --   --  47   ALT (SGPT) U/L  --   --   --  16   AST (SGOT) U/L  --   --   --  24   GLUCOSE mg/dL 123*  --  118* 157*     Results from last 7 days   Lab Units 06/20/24  0608 06/19/24  0727 06/18/24  1637   WBC 10*3/mm3 4.96 4.81 7.46   HEMOGLOBIN g/dL 11.2* 11.2* 13.3   HEMATOCRIT % 34.2 34.1 38.7   PLATELETS 10*3/mm3 174 200 262         Results from last 7 days   Lab Units 06/18/24  1637   HSTROP T ng/L 16*     Results from last 7 days   Lab Units 06/18/24  1637   PROBNP pg/mL 162.5                       Pertinent Radiology Results:    Imaging Results (All)       Procedure Component Value Units Date/Time    US Arterial Doppler Upper Extremity Bilateral [[id number]] Collected: 06/19/24 1530     Updated: 06/20/24 0905    Narrative:      ARTERIAL DUPLEX DOPPLER EVALUATION OF THE LEFT AND RIGHT UPPER EXTREMITY  WITH SPECTRAL ANALYSIS .     HISTORY: Peripheral arterial disease, blood pressure discrepancy, left  greater than right.     PROCEDURE: Spectral and color Doppler waveform evaluation of the left  and right upper extremity was performed . Spectral analysis was  performed.     FINDINGS:      RIGHT UPPER EXTREMITY.      Velocities cm/sec :      SCA: 99.9  AxA Mid: 56.4  BrA Dist: 117  Rad: 96.5  Ul: 126  Carotid: 81.5   Waveforms are mostly biphasic.           LEFT UPPER EXTREMITY.      Velocities cm/sec :      SCA: 98.5  AxA Mid: 78.1  BrA Dist: 101  Rad: 96.7  Ul: 151  Vert: 75.4   Waveforms are mostly biphasic.       Impression:      Patent arteries bilaterally. No significant stenosis.              Images were reviewed, interpreted, and dictated by Dr. Shiela Browne MD  Transcribed by Margaret Ag PA-C.     This report was signed and finalized on 6/20/2024 9:03 AM by Shiela Browne MD.       US Renal  Bilateral [[id number]] Collected: 06/19/24 1114     Updated: 06/19/24 1318    Narrative:      PROCEDURE: US RENAL BILATERAL-     HISTORY: GORGE; I95.1-Orthostatic hypotension; N17.9-Acute kidney failure,  unspecified     PROCEDURE: Ultrasound images of the kidneys were obtained.     FINDINGS: The kidneys are normal in size and echogenicity with the right  kidney measuring 10.05 cm and the left kidney measuring 10.1 cm . There  are left renal simple cysts measuring up to 3 cm. There is no  obstructing renal stone. There is no hydronephrosis.       Impression:      Left renal cysts.                 Images were reviewed, interpreted, and dictated by Dr. Shiela Browne MD  Transcribed by Margaret Ag PA-C.     This report was signed and finalized on 6/19/2024 1:16 PM by Shiela Browne MD.       XR Chest 1 View [[id number]] Collected: 06/18/24 1853     Updated: 06/18/24 1902    Narrative:      FINAL REPORT    TECHNIQUE:  SINGLE AP VIEW CHEST OF THE CHEST OBTAINED.    CLINICAL HISTORY:  SOA    FINDINGS:  COMPARISON: None   FINDINGS: The heart size is normal. The  mediastinum is normal. There is no focal infiltrate or edema.  There are no pleural effusions. There is no pneumothorax. There  is no osseous abnormality.      Impression:      No acute cardiopulmonary process.    Authenticated and Electronically Signed by Kim Romero MD on  06/18/2024 07:01:21 PM            Echo:    Results for orders placed during the hospital encounter of 06/18/24    Adult Transthoracic Echo Complete W/ Cont if Necessary Per Protocol    Interpretation Summary    Left ventricular systolic function is normal. Left ventricular ejection fraction appears to be 61 - 65%. Left ventricular diastolic function was normal.    The right ventricular cavity is borderline dilated with normal systolic function.    Moderate aortic valve regurgitation is present.    Condition on Discharge:      Stable.    Code status during the hospital stay:    Code  Status and Medical Interventions:   Ordered at: 06/18/24 2043     Code Status (Patient has no pulse and is not breathing):    CPR (Attempt to Resuscitate)     Medical Interventions (Patient has pulse or is breathing):    Full Support     Discharge Disposition:    Home or Self Care    Discharge Medications:       Discharge Medications        New Medications        Instructions Start Date   albuterol sulfate  (90 Base) MCG/ACT inhaler  Commonly known as: PROVENTIL HFA;VENTOLIN HFA;PROAIR HFA   2 puffs, Inhalation, Every 4 Hours PRN             Changes to Medications        Instructions Start Date   metFORMIN 1000 MG tablet  Commonly known as: GLUCOPHAGE  What changed: additional instructions   1,000 mg, Oral, 2 Times Daily With Meals, HOLD x 2 days due to recent contrast      omeprazole 40 MG capsule  Commonly known as: priLOSEC  What changed:   how much to take  when to take this   TAKE ONE CAPSULE BY MOUTH 30 MINUTES BEFORE BREAKFAST DAILY. Needs office visit for further refills.             Continue These Medications        Instructions Start Date   Accu-Chek FastClix Lancets misc   TEST 1-2 TIMES DAILY      aspirin 81 MG chewable tablet   81 mg, Oral, Daily      ezetimibe 10 MG tablet  Commonly known as: ZETIA   10 mg, Oral, Daily      glucose blood test strip  Commonly known as: Accu-Chek Guide   Test 1-2 Times daily      levothyroxine 25 MCG tablet  Commonly known as: SYNTHROID, LEVOTHROID   25 mcg, Oral, Daily      promethazine 12.5 MG tablet  Commonly known as: PHENERGAN   12.5 mg, Oral, Every 8 Hours PRN      rosuvastatin 40 MG tablet  Commonly known as: CRESTOR   40 mg, Oral, Daily      venlafaxine XR 75 MG 24 hr capsule  Commonly known as: EFFEXOR-XR   1 capsule Daily.      VITAMIN D (CHOLECALCIFEROL) PO   1,000 Units, Oral, Daily             Stop These Medications      furosemide 40 MG tablet  Commonly known as: LASIX     metoprolol succinate  MG 24 hr tablet  Commonly known as: Toprol XL      potassium chloride 20 MEQ CR tablet  Commonly known as: KLOR-CON M20     Trulicity 0.75 MG/0.5ML solution pen-injector  Generic drug: Dulaglutide            Discharge Diet:     Diet Instructions       Diet: Cardiac Diets, Diabetic Diets; Healthy Heart (2-3 Na+); Thin (IDDSI 0); Consistent Carbohydrate      Discharge Diet:  Cardiac Diets  Diabetic Diets       Cardiac Diet: Healthy Heart (2-3 Na+)    Fluid Consistency: Thin (IDDSI 0)    Diabetic Diet: Consistent Carbohydrate          Activity at Discharge:     Activity Instructions       Activity as Tolerated            Follow-up Appointments:     Follow-up Information       Chrystal Buckley APRN Follow up in 1 week(s).    Specialty: Nurse Practitioner  Contact information:  [address]  [address]  [facility] [id number] [phone]               Opal Lazcano MD .    Specialty: Pulmonary Disease  Contact information:  [address]  [facility] [address]  [phone]                           Future Appointments   Date Time Provider Department Center   8/26/2024  1:20 PM Pao John MD [facility]   12/10/2024  2:30 PM Nia García APRN [facility]   2/21/2025 11:45 AM Glen Ríos MD [facility]     Test Results Pending at Discharge:           SHELLY Melvin  06/20/24  11:14 EDT    Time: I spent 40 minutes on this discharge activity which included: face-to-face encounter with the patient, reviewing the data in the system, coordination of the care with the nursing staff as well as consultants, documentation, and entering orders.     Dictated utilizing Dragon dictation.

## 2024-06-20 NOTE — PLAN OF CARE
Goal Outcome Evaluation:  Plan of Care Reviewed With: patient        Progress: improving  Outcome Evaluation: VSS, maintaining o2 sats >90% on RA, pt reports improvement of SOA, ambulating to bathroom with standby assistance, denies dizziness.
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
## 2024-06-20 NOTE — PLAN OF CARE
Goal Outcome Evaluation:  Plan of Care Reviewed With: patient        Progress: no change  Outcome Evaluation: Patient participated well in PT evaluation and demonstrated safe, independent functional mobility.  She was able to walk 450 feet with no more than supervision.  She does not require the skills of PT and we will sign off.      Anticipated Discharge Disposition (PT): home

## 2024-06-27 ENCOUNTER — TRANSCRIBE ORDERS (OUTPATIENT)
Dept: ULTRASOUND IMAGING | Facility: HOSPITAL | Age: 74
End: 2024-06-27
Payer: MEDICARE

## 2024-06-27 ENCOUNTER — HOSPITAL ENCOUNTER (OUTPATIENT)
Dept: ULTRASOUND IMAGING | Facility: HOSPITAL | Age: 74
Discharge: HOME | End: 2024-06-27
Admitting: NURSE PRACTITIONER
Payer: MEDICARE

## 2024-06-27 DIAGNOSIS — R22.9 LOCALIZED SWELLING, MASS AND LUMP, UNSPECIFIED: ICD-10-CM

## 2024-06-27 DIAGNOSIS — R22.9 LOCALIZED SWELLING, MASS AND LUMP, UNSPECIFIED: Primary | ICD-10-CM

## 2024-06-27 PROCEDURE — 76999 ECHO EXAMINATION PROCEDURE: CPT

## 2024-08-27 NOTE — PROGRESS NOTES
"Patient: Latonya Mei  YOB: 1950    Date: 09/04/2024    Primary Care Provider: Chrystal Buckley APRN    Chief Complaint   Patient presents with    Follow-up     1 yr thyroid        History: The patient is in the office today for her one year thyroid follow up.  The patient did have a left thyroidectomy in December of 2017.  This was benign.  Ultrasound indicates no new lesions on the left side and small cyst in the right lobe    The following portions of the patient's history were reviewed and updated as appropriate: allergies, current medications, past family history, past medical history, past social history, past surgical history and problem list.    Review of Systems   Constitutional:  Negative for chills, fever and unexpected weight change.   HENT:  Negative for hearing loss, trouble swallowing and voice change.    Eyes:  Negative for visual disturbance.   Respiratory:  Negative for apnea, cough, chest tightness, shortness of breath and wheezing.    Cardiovascular:  Negative for chest pain, palpitations and leg swelling.   Gastrointestinal:  Negative for abdominal distention, abdominal pain, anal bleeding, blood in stool, constipation, diarrhea, nausea, rectal pain and vomiting.   Endocrine: Negative for cold intolerance and heat intolerance.   Genitourinary:  Negative for difficulty urinating, dysuria and flank pain.   Musculoskeletal:  Negative for back pain and gait problem.   Skin:  Negative for color change, rash and wound.   Neurological:  Negative for dizziness, syncope, speech difficulty, weakness, light-headedness, numbness and headaches.   Hematological:  Negative for adenopathy. Does not bruise/bleed easily.   Psychiatric/Behavioral:  Negative for confusion. The patient is not nervous/anxious.        Vital Signs  Vitals:    09/04/24 1038   BP: 122/60   Pulse: 63   Temp: 97.7 °F (36.5 °C)   SpO2: 98%   Weight: 74.4 kg (164 lb)   Height: 167.6 cm (65.98\")       Allergies:  Allergies "   Allergen Reactions    Dust Mite Extract Cough    Grass Cough    Latex Hives     AND ITCHING    Pineapple Itching    Rye Cough    Tuberculin Tests Itching    Wheat Cough       Medications:    Current Outpatient Medications:     ACCU-CHEK FASTCLIX LANCETS misc, TEST 1-2 TIMES DAILY, Disp: 50 each, Rfl: 12    albuterol sulfate  (90 Base) MCG/ACT inhaler, Inhale 2 puffs Every 4 (Four) Hours As Needed for Wheezing., Disp: 8.5 g, Rfl: 0    aspirin 81 MG chewable tablet, Chew 1 tablet Daily., Disp: , Rfl:     ezetimibe (ZETIA) 10 MG tablet, Take 1 tablet by mouth Daily., Disp: , Rfl:     glucose blood (ACCU-CHEK GUIDE) test strip, Test 1-2 Times daily, Disp: 50 each, Rfl: 12    levothyroxine (SYNTHROID, LEVOTHROID) 25 MCG tablet, Take 1 tablet by mouth Daily., Disp: 90 tablet, Rfl: 2    metFORMIN (GLUCOPHAGE) 1000 MG tablet, Take 1 tablet by mouth 2 (Two) Times a Day With Meals. HOLD x 2 days due to recent contrast, Disp: , Rfl:     methocarbamol (ROBAXIN) 500 MG tablet, Take 1 tablet by mouth 4 (Four) Times a Day for 3 days., Disp: 12 tablet, Rfl: 0    omeprazole (priLOSEC) 40 MG capsule, TAKE ONE CAPSULE BY MOUTH 30 MINUTES BEFORE BREAKFAST DAILY. Needs office visit for further refills. (Patient taking differently: 1 capsule Daily. TAKE ONE CAPSULE BY MOUTH 30 MINUTES BEFORE BREAKFAST DAILY. Needs office visit for further refills.), Disp: 30 capsule, Rfl: 0    promethazine (PHENERGAN) 12.5 MG tablet, TAKE ONE TABLET BY MOUTH EVERY 8 HOURS AS NEEDED FOR NAUSEA AND VOMITING, Disp: 30 tablet, Rfl: 0    rosuvastatin (CRESTOR) 40 MG tablet, Take 1 tablet by mouth Daily., Disp: , Rfl:     venlafaxine XR (EFFEXOR-XR) 75 MG 24 hr capsule, 1 capsule Daily., Disp: , Rfl:     VITAMIN D, CHOLECALCIFEROL, PO, Take 1,000 Units by mouth Daily., Disp: , Rfl:     meloxicam (MOBIC) 7.5 MG tablet, Take 1 tablet by mouth Daily for 7 days. (Patient not taking: Reported on 9/4/2024), Disp: 7 tablet, Rfl: 0  No current  facility-administered medications for this visit.    Physical Exam:    Neck-no palpable thyroid nodules.  Well-healed scar.     Results Review:   I reviewed the patient's new clinical results.     Review of Systems was reviewed and confirmed as accurate as documented by the MA.    ASSESSMENT/PLAN:    1. Thyroid nodule       Previous left thyroid lobectomy, no recurrence.  Right side has small cyst.  Follow-up in 1 year.    Electronically signed by Pao John MD  09/04/24

## 2024-09-02 ENCOUNTER — HOSPITAL ENCOUNTER (EMERGENCY)
Facility: HOSPITAL | Age: 74
Discharge: HOME | End: 2024-09-02
Attending: STUDENT IN AN ORGANIZED HEALTH CARE EDUCATION/TRAINING PROGRAM
Payer: MEDICARE

## 2024-09-02 ENCOUNTER — APPOINTMENT (OUTPATIENT)
Dept: GENERAL RADIOLOGY | Facility: HOSPITAL | Age: 74
End: 2024-09-02
Payer: MEDICARE

## 2024-09-02 ENCOUNTER — APPOINTMENT (OUTPATIENT)
Dept: ULTRASOUND IMAGING | Facility: HOSPITAL | Age: 74
End: 2024-09-02
Payer: MEDICARE

## 2024-09-02 VITALS
OXYGEN SATURATION: 98 % | BODY MASS INDEX: 25.71 KG/M2 | HEIGHT: 66 IN | DIASTOLIC BLOOD PRESSURE: 68 MMHG | RESPIRATION RATE: 18 BRPM | TEMPERATURE: 98.7 F | WEIGHT: 160 LBS | SYSTOLIC BLOOD PRESSURE: 146 MMHG | HEART RATE: 83 BPM

## 2024-09-02 DIAGNOSIS — M25.562 ACUTE PAIN OF LEFT KNEE: Primary | ICD-10-CM

## 2024-09-02 PROCEDURE — 99284 EMERGENCY DEPT VISIT MOD MDM: CPT

## 2024-09-02 PROCEDURE — 93971 EXTREMITY STUDY: CPT

## 2024-09-02 PROCEDURE — 73562 X-RAY EXAM OF KNEE 3: CPT

## 2024-09-02 PROCEDURE — 63710000001 ONDANSETRON ODT 4 MG TABLET DISPERSIBLE

## 2024-09-02 RX ORDER — MELOXICAM 7.5 MG/1
7.5 TABLET ORAL DAILY
Qty: 7 TABLET | Refills: 0 | Status: SHIPPED | OUTPATIENT
Start: 2024-09-02 | End: 2024-09-06

## 2024-09-02 RX ORDER — METHOCARBAMOL 750 MG/1
1500 TABLET, FILM COATED ORAL ONCE
Status: COMPLETED | OUTPATIENT
Start: 2024-09-02 | End: 2024-09-02

## 2024-09-02 RX ORDER — ONDANSETRON 4 MG/1
4 TABLET, ORALLY DISINTEGRATING ORAL ONCE
Status: COMPLETED | OUTPATIENT
Start: 2024-09-02 | End: 2024-09-02

## 2024-09-02 RX ORDER — METHOCARBAMOL 500 MG/1
500 TABLET, FILM COATED ORAL 4 TIMES DAILY
Qty: 12 TABLET | Refills: 0 | Status: SHIPPED | OUTPATIENT
Start: 2024-09-02 | End: 2024-09-06

## 2024-09-02 RX ORDER — OXYCODONE AND ACETAMINOPHEN 5; 325 MG/1; MG/1
1 TABLET ORAL ONCE
Status: COMPLETED | OUTPATIENT
Start: 2024-09-02 | End: 2024-09-02

## 2024-09-02 RX ADMIN — OXYCODONE HYDROCHLORIDE AND ACETAMINOPHEN 1 TABLET: 5; 325 TABLET ORAL at 16:10

## 2024-09-02 RX ADMIN — METHOCARBAMOL 1500 MG: 750 TABLET ORAL at 16:10

## 2024-09-02 RX ADMIN — ONDANSETRON 4 MG: 4 TABLET, ORALLY DISINTEGRATING ORAL at 16:10
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
## 2024-09-03 ENCOUNTER — OFFICE VISIT (OUTPATIENT)
Dept: ORTHOPEDIC SURGERY | Facility: CLINIC | Age: 74
End: 2024-09-03
Payer: MEDICARE

## 2024-09-03 VITALS
WEIGHT: 162 LBS | BODY MASS INDEX: 26.03 KG/M2 | SYSTOLIC BLOOD PRESSURE: 120 MMHG | HEIGHT: 66 IN | DIASTOLIC BLOOD PRESSURE: 76 MMHG

## 2024-09-03 DIAGNOSIS — M17.12 ARTHRITIS OF KNEE, LEFT: ICD-10-CM

## 2024-09-03 DIAGNOSIS — M25.562 ACUTE PAIN OF LEFT KNEE: Primary | ICD-10-CM

## 2024-09-03 PROCEDURE — 99204 OFFICE O/P NEW MOD 45 MIN: CPT | Performed by: PHYSICIAN ASSISTANT

## 2024-09-03 PROCEDURE — 3078F DIAST BP <80 MM HG: CPT | Performed by: PHYSICIAN ASSISTANT

## 2024-09-03 PROCEDURE — 3074F SYST BP LT 130 MM HG: CPT | Performed by: PHYSICIAN ASSISTANT

## 2024-09-03 PROCEDURE — 1159F MED LIST DOCD IN RCRD: CPT | Performed by: PHYSICIAN ASSISTANT

## 2024-09-03 PROCEDURE — 1160F RVW MEDS BY RX/DR IN RCRD: CPT | Performed by: PHYSICIAN ASSISTANT

## 2024-09-03 PROCEDURE — 20610 DRAIN/INJ JOINT/BURSA W/O US: CPT | Performed by: PHYSICIAN ASSISTANT

## 2024-09-03 RX ORDER — LIDOCAINE HYDROCHLORIDE 20 MG/ML
2 INJECTION, SOLUTION INFILTRATION; PERINEURAL
Status: COMPLETED | OUTPATIENT
Start: 2024-09-03 | End: 2024-09-03

## 2024-09-03 RX ORDER — METHYLPREDNISOLONE ACETATE 40 MG/ML
40 INJECTION, SUSPENSION INTRA-ARTICULAR; INTRALESIONAL; INTRAMUSCULAR; SOFT TISSUE
Status: COMPLETED | OUTPATIENT
Start: 2024-09-03 | End: 2024-09-03

## 2024-09-03 RX ADMIN — METHYLPREDNISOLONE ACETATE 40 MG: 40 INJECTION, SUSPENSION INTRA-ARTICULAR; INTRALESIONAL; INTRAMUSCULAR; SOFT TISSUE at 15:23

## 2024-09-03 RX ADMIN — LIDOCAINE HYDROCHLORIDE 2 ML: 20 INJECTION, SOLUTION INFILTRATION; PERINEURAL at 15:23

## 2024-09-03 NOTE — PATIENT INSTRUCTIONS
Obesity, Adult  Obesity is the condition of having too much total body fat. Being overweight or obese means that your weight is greater than what is considered healthy for your body size. Obesity is determined by a measurement called BMI (body mass index). BMI is an estimate of body fat and is calculated from height and weight. For adults, a BMI of 30 or higher is considered obese.  Obesity can lead to other health concerns and major illnesses, including:  Stroke.  Coronary artery disease (CAD).  Type 2 diabetes.  Some types of cancer, including cancers of the colon, breast, uterus, and gallbladder.  High blood pressure (hypertension).  High cholesterol.  Gallbladder stones.  Obesity can also contribute to:  Osteoarthritis.  Sleep apnea.  Infertility problems.  What are the causes?  Common causes of this condition include:  Eating daily meals that are high in calories, sugar, and fat.  Drinking high amounts of sugar-sweetened beverages, such as soft drinks.  Being born with genes that may make you more likely to become obese.  Having a medical condition that causes obesity, including:  Hypothyroidism.  Polycystic ovarian syndrome (PCOS).  Binge-eating disorder.  Cushing syndrome.  Taking certain medicines, such as steroids, antidepressants, and seizure medicines.  Not being physically active (sedentary lifestyle).  Not getting enough sleep.  What increases the risk?  The following factors may make you more likely to develop this condition:  Having a family history of obesity.  Living in an area with limited access to:  [place], recreation centers, or sidewalks.  Healthy food choices, such as grocery stores and [employer] markets.  What are the signs or symptoms?  The main sign of this condition is having too much body fat.  How is this diagnosed?  This condition is diagnosed based on:  Your BMI. If you are an adult with a BMI of 30 or higher, you are considered obese.  Your waist circumference. This measures the  distance around your waistline.  Your skinfold thickness. Your health care provider may gently pinch a fold of your skin and measure it.  You may have other tests to check for underlying conditions.  How is this treated?  Treatment for this condition often includes changing your lifestyle. Treatment may include some or all of the following:  Dietary changes. This may include developing a healthy meal plan.  Regular physical activity. This may include activity that causes your heart to beat faster (aerobic exercise) and strength training. Work with your health care provider to design an exercise program that works for you.  Medicine to help you lose weight if you are unable to lose one pound a week after six weeks of healthy eating and more physical activity.  Treating conditions that cause the obesity (underlying conditions).  Surgery. Surgical options may include gastric banding and gastric bypass. Surgery may be done if:  Other treatments have not helped to improve your condition.  You have a BMI of 40 or higher.  You have life-threatening health problems related to obesity.  Follow these instructions at home:  Eating and drinking    Follow recommendations from your health care provider about what you eat and drink. Your health care provider may advise you to:  Limit fast food, sweets, and processed snack foods.  Choose low-fat options, such as low-fat milk instead of whole milk.  Eat five or more servings of fruits or vegetables every day.  Choose healthy foods when you eat out.  Keep low-fat snacks available.  Limit sugary drinks, such as soda, fruit juice, sweetened iced tea, and flavored milk.  Drink enough water to keep your urine pale yellow.  Do not follow a fad diet. Fad diets can be unhealthy and even dangerous.  Other healthful choices include:  Eat at home more often. This gives you more control over what you eat.  Learn to read food labels. This will help you understand how much food is considered one  serving.  Learn what a healthy serving size is.  Physical activity  Exercise regularly, as told by your health care provider.  Most adults should get up to 150 minutes of moderate-intensity exercise every week.  Ask your health care provider what types of exercise are safe for you and how often you should exercise.  Warm up and stretch before being active.  Cool down and stretch after being active.  Rest between periods of activity.  Lifestyle  Work with your health care provider and a dietitian to set a weight-loss goal that is healthy and reasonable for you.  Limit your screen time.  Find ways to reward yourself that do not involve food.  Do not drink alcohol if:  Your health care provider tells you not to drink.  You are pregnant, may be pregnant, or are planning to become pregnant.  If you drink alcohol:  Limit how much you have to:  0-1 drink a day for women.  0-2 drinks a day for men.  Know how much alcohol is in your drink. In the [place] one drink equals one 12 oz bottle of beer (355 mL), one 5 oz glass of wine (148 mL), or one 1½ oz glass of hard liquor (44 mL).  General instructions  Keep a weight-loss journal to keep track of the food you eat and how much exercise you get.  Take over-the-counter and prescription medicines only as told by your health care provider.  Take vitamins and supplements only as told by your health care provider.  Consider joining a support group. Your health care provider may be able to recommend a support group.  Pay attention to your mental health as obesity can lead to depression or self esteem issues.  Keep all follow-up visits. This is important.  Contact a health care provider if:  You are unable to meet your weight-loss goal after six weeks of dietary and lifestyle changes.  You have trouble breathing.  Summary  Obesity is the condition of having too much total body fat.  Being overweight or obese means that your weight is greater than what is considered healthy for your body  size.  Work with your health care provider and a dietitian to set a weight-loss goal that is healthy and reasonable for you.  Exercise regularly, as told by your health care provider. Ask your health care provider what types of exercise are safe for you and how often you should exercise.  This information is not intended to replace advice given to you by your health care provider. Make sure you discuss any questions you have with your health care provider.  Document Revised: 07/26/2022 Document Reviewed: 07/26/2022  [employer] Patient Education © 2024 [employer]    MyPlate from [employer]    MyPlate is an outline of a general healthy diet based on the Dietary Guidelines for Americans, 0455-1093, from the U.[place]. Department of Agriculture (USDA). It sets guidelines for how much food you should eat from each food group based on your age, sex, and level of physical activity.  What are tips for following MyPlate?  To follow MyPlate recommendations:  Eat a wide variety of fruits and vegetables, grains, and protein foods.  Serve smaller portions and eat less food throughout the day.  Limit portion sizes to avoid overeating.  Enjoy your food.  Get at least 150 minutes of exercise every week. This is about 30 minutes each day, 5 or more days per week.  It can be difficult to have every meal look like MyPlate. Think about MyPlate as eating guidelines for an entire day, rather than each individual meal.  Fruits and vegetables  Make one half of your plate fruits and vegetables.  Eat many different colors of fruits and vegetables each day.  For a 2,000-calorie daily food plan, eat:  2½ cups of vegetables every day.  2 cups of fruit every day.  1 cup is equal to:  1 cup raw or cooked vegetables.  1 cup raw fruit.  1 medium-sized orange, apple, or banana.  1 cup 100% fruit or vegetable juice.  2 cups raw leafy greens, such as lettuce, spinach, or kale.  ½ cup dried fruit.  Grains  One fourth of your plate should be grains.  Make at least  half of the grains you eat each day whole grains.  For a 2,000-calorie daily food plan, eat 6 oz of grains every day.  1 oz is equal to:  1 slice bread.  1 cup cereal.  ½ cup cooked rice, cereal, or pasta.  Protein  One fourth of your plate should be protein.  Eat a wide variety of protein foods, including meat, poultry, fish, eggs, beans, nuts, and tofu.  For a 2,000-calorie daily food plan, eat 5½ oz of protein every day.  1 oz is equal to:  1 oz meat, poultry, or fish.  ¼ cup cooked beans.  1 egg.  ½ oz nuts or seeds.  1 Tbsp peanut butter.  Dairy  Drink fat-free or low-fat (1%) milk.  Eat or drink dairy as a side to meals.  For a 2,000-calorie daily food plan, eat or drink 3 cups of dairy every day.  1 cup is equal to:  1 cup milk, yogurt, cottage cheese, or soy milk (soy beverage).  2 oz processed cheese.  1½ oz natural cheese.  Fats, oils, salt, and sugars  Only small amounts of oils are recommended.  Avoid foods that are high in calories and low in nutritional value (empty calories), like foods high in fat or added sugars.  Choose foods that are low in salt (sodium). Choose foods that have less than 140 milligrams (mg) of sodium per serving.  Drink water instead of sugary drinks. Drink enough fluid to keep your urine pale yellow.  Where to find support  Work with your health care provider or a dietitian to develop a customized eating plan that is right for you.  Download an [employer]aro (mobile application) to help you track your daily food intake.  Where to find more information  USDA: ChooseMyPlate.gov  Summary  MyPlate is a general guideline for healthy eating from the USDA. It is based on the Dietary Guidelines for Americans, 6500-2040.  In general, fruits and vegetables should take up one half of your plate, grains should take up one fourth of your plate, and protein should take up one fourth of your plate.  This information is not intended to replace advice given to you by your health care provider. Make sure  you discuss any questions you have with your health care provider.  Document Revised: 11/08/2021 Document Reviewed: 11/08/2021  [employer] Patient Education © 2024 [employer]
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
## 2024-09-04 ENCOUNTER — OFFICE VISIT (OUTPATIENT)
Dept: SURGERY | Facility: CLINIC | Age: 74
End: 2024-09-04
Payer: MEDICARE

## 2024-09-04 VITALS
HEIGHT: 66 IN | BODY MASS INDEX: 26.36 KG/M2 | WEIGHT: 164 LBS | HEART RATE: 63 BPM | OXYGEN SATURATION: 98 % | DIASTOLIC BLOOD PRESSURE: 60 MMHG | TEMPERATURE: 97.7 F | SYSTOLIC BLOOD PRESSURE: 122 MMHG

## 2024-09-04 DIAGNOSIS — E04.1 THYROID NODULE: Primary | ICD-10-CM

## 2024-09-04 PROCEDURE — 1160F RVW MEDS BY RX/DR IN RCRD: CPT | Performed by: SURGERY

## 2024-09-04 PROCEDURE — 3078F DIAST BP <80 MM HG: CPT | Performed by: SURGERY

## 2024-09-04 PROCEDURE — 3074F SYST BP LT 130 MM HG: CPT | Performed by: SURGERY

## 2024-09-04 PROCEDURE — 99212 OFFICE O/P EST SF 10 MIN: CPT | Performed by: SURGERY

## 2024-09-04 PROCEDURE — 1159F MED LIST DOCD IN RCRD: CPT | Performed by: SURGERY

## 2024-09-06 ENCOUNTER — OFFICE VISIT (OUTPATIENT)
Dept: PULMONOLOGY | Facility: CLINIC | Age: 74
End: 2024-09-06
Payer: MEDICARE

## 2024-09-06 ENCOUNTER — PATIENT ROUNDING (BHMG ONLY) (OUTPATIENT)
Dept: PULMONOLOGY | Facility: CLINIC | Age: 74
End: 2024-09-06
Payer: MEDICARE

## 2024-09-06 VITALS
DIASTOLIC BLOOD PRESSURE: 60 MMHG | HEART RATE: 65 BPM | BODY MASS INDEX: 25.88 KG/M2 | RESPIRATION RATE: 18 BRPM | HEIGHT: 66 IN | SYSTOLIC BLOOD PRESSURE: 124 MMHG | WEIGHT: 161 LBS | OXYGEN SATURATION: 97 %

## 2024-09-06 DIAGNOSIS — R05.3 CHRONIC COUGH: ICD-10-CM

## 2024-09-06 DIAGNOSIS — J30.1 SEASONAL ALLERGIC RHINITIS DUE TO POLLEN: ICD-10-CM

## 2024-09-06 DIAGNOSIS — R06.09 DYSPNEA ON EXERTION: Primary | ICD-10-CM

## 2024-09-06 PROCEDURE — 99204 OFFICE O/P NEW MOD 45 MIN: CPT | Performed by: INTERNAL MEDICINE

## 2024-09-06 PROCEDURE — 3078F DIAST BP <80 MM HG: CPT | Performed by: INTERNAL MEDICINE

## 2024-09-06 PROCEDURE — 3074F SYST BP LT 130 MM HG: CPT | Performed by: INTERNAL MEDICINE

## 2024-09-06 RX ORDER — CETIRIZINE HYDROCHLORIDE 10 MG/1
5 TABLET ORAL DAILY
Qty: 30 TABLET | Refills: 5 | Status: SHIPPED | OUTPATIENT
Start: 2024-09-06
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
## 2024-09-11 ENCOUNTER — PATIENT ROUNDING (BHMG ONLY) (OUTPATIENT)
Dept: ORTHOPEDIC SURGERY | Facility: CLINIC | Age: 74
End: 2024-09-11
Payer: MEDICARE

## 2024-09-11 NOTE — PROGRESS NOTES
September 11, 2024    Hello, may I speak with Latonya Mei?    My name is Sarah      I am  with [facility]  [address] [address] [phone].    Before we get started may I verify your date of birth? 1950    I am calling to officially welcome you to our practice and ask about your recent visit. Is this a good time to talk? No - no answer    Tell me about your visit with us. What things went well?         We're always looking for ways to make our patients' experiences even better. Do you have recommendations on ways we may improve?      Overall were you satisfied with your first visit to our practice?        I appreciate you taking the time to speak with me today. Is there anything else I can do for you?       Thank you, and have a great day.

## 2024-10-02 DIAGNOSIS — M25.562 ACUTE PAIN OF LEFT KNEE: Primary | ICD-10-CM

## 2024-10-03 ENCOUNTER — OFFICE VISIT (OUTPATIENT)
Dept: ORTHOPEDIC SURGERY | Facility: CLINIC | Age: 74
End: 2024-10-03
Payer: MEDICARE

## 2024-10-03 VITALS
WEIGHT: 159 LBS | BODY MASS INDEX: 25.55 KG/M2 | SYSTOLIC BLOOD PRESSURE: 140 MMHG | HEIGHT: 66 IN | DIASTOLIC BLOOD PRESSURE: 64 MMHG

## 2024-10-03 DIAGNOSIS — M25.562 ACUTE PAIN OF LEFT KNEE: Primary | ICD-10-CM

## 2024-10-03 DIAGNOSIS — M17.12 ARTHRITIS OF KNEE, LEFT: ICD-10-CM

## 2024-10-03 PROCEDURE — 1160F RVW MEDS BY RX/DR IN RCRD: CPT | Performed by: PHYSICIAN ASSISTANT

## 2024-10-03 PROCEDURE — 99213 OFFICE O/P EST LOW 20 MIN: CPT | Performed by: PHYSICIAN ASSISTANT

## 2024-10-03 PROCEDURE — 3077F SYST BP >= 140 MM HG: CPT | Performed by: PHYSICIAN ASSISTANT

## 2024-10-03 PROCEDURE — 1159F MED LIST DOCD IN RCRD: CPT | Performed by: PHYSICIAN ASSISTANT

## 2024-10-03 PROCEDURE — 3078F DIAST BP <80 MM HG: CPT | Performed by: PHYSICIAN ASSISTANT
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
## 2024-10-17 ENCOUNTER — OFFICE VISIT (OUTPATIENT)
Dept: PULMONOLOGY | Facility: CLINIC | Age: 74
End: 2024-10-17
Payer: MEDICARE

## 2024-10-17 DIAGNOSIS — R06.09 DYSPNEA ON EXERTION: ICD-10-CM

## 2024-10-18 ENCOUNTER — OFFICE VISIT (OUTPATIENT)
Dept: PULMONOLOGY | Facility: CLINIC | Age: 74
End: 2024-10-18
Payer: MEDICARE

## 2024-10-18 VITALS
HEIGHT: 66 IN | HEART RATE: 85 BPM | OXYGEN SATURATION: 97 % | RESPIRATION RATE: 18 BRPM | BODY MASS INDEX: 25.55 KG/M2 | SYSTOLIC BLOOD PRESSURE: 112 MMHG | WEIGHT: 159 LBS | DIASTOLIC BLOOD PRESSURE: 60 MMHG

## 2024-10-18 DIAGNOSIS — R05.3 CHRONIC COUGH: ICD-10-CM

## 2024-10-18 DIAGNOSIS — J30.1 SEASONAL ALLERGIC RHINITIS DUE TO POLLEN: ICD-10-CM

## 2024-10-18 DIAGNOSIS — R06.09 DYSPNEA ON EXERTION: Primary | ICD-10-CM

## 2024-10-18 PROCEDURE — 3078F DIAST BP <80 MM HG: CPT | Performed by: INTERNAL MEDICINE

## 2024-10-18 PROCEDURE — 99213 OFFICE O/P EST LOW 20 MIN: CPT | Performed by: INTERNAL MEDICINE

## 2024-10-18 PROCEDURE — 3074F SYST BP LT 130 MM HG: CPT | Performed by: INTERNAL MEDICINE

## 2024-10-18 NOTE — PROGRESS NOTES
Pulmonary Folow Up Office Visit      Patient Name: Latonya Mei    Referring Physician: No ref. provider found    Chief Complaint:    Chief Complaint   Patient presents with    Breathing Problem    Follow-up       Subjective: Latonya Mei is a 73 y.o. female who is here today to establish care with Pulmonary.     Patient admitted to the hospital in June 2024 secondary to dizziness/light headedness and found to have GORGE which resolved with fluids. Cr went from 1.7 to 1.1 with treatment.  She was also complaining of worsening dyspnea on exertion and therefore had further workup with normal echo and heart cath which showed no focal obstructions.  Did have mild aortic regurgitation and was sent here for further workup for shortness of breath.    Since last visit, patient feels like her symptoms are unchanged.  She continues to have moderate shortness of breath with moderate exertion.  Does not feel like inhalers help much.  PFTs from today showed normal lung function.  Has had extensive cardiac workup with no etiology determined.  Unremarkable pulmonary imaging.    Retired after 20 yrs of working [employer] in 2010      Subjective      Review of Systems:   Review of Systems   Respiratory:  Positive for shortness of breath.    Gastrointestinal:  Positive for GERD.   Allergic/Immunologic: Positive for environmental allergies and food allergies.       Social History:   Social History     Socioeconomic History    Marital status:    Tobacco Use    Smoking status: Never    Smokeless tobacco: Never   Vaping Use    Vaping status: Never Used   Substance and Sexual Activity    Alcohol use: No    Drug use: No    Sexual activity: Defer       Medications:     Current Outpatient Medications:     ACCU-CHEK FASTCLIX LANCETS misc, TEST 1-2 TIMES DAILY, Disp: 50 each, Rfl: 12    albuterol sulfate  (90 Base) MCG/ACT inhaler, Inhale 2 puffs Every 4 (Four) Hours As Needed for Wheezing., Disp: 8.5 g, Rfl: 0    " aspirin 81 MG chewable tablet, Chew 1 tablet Daily., Disp: , Rfl:     cetirizine (zyrTEC) 10 MG tablet, Take 0.5 tablets by mouth Daily., Disp: 30 tablet, Rfl: 5    ezetimibe (ZETIA) 10 MG tablet, Take 1 tablet by mouth Daily., Disp: , Rfl:     glucose blood (ACCU-CHEK GUIDE) test strip, Test 1-2 Times daily, Disp: 50 each, Rfl: 12    levothyroxine (SYNTHROID, LEVOTHROID) 25 MCG tablet, Take 1 tablet by mouth Daily., Disp: 90 tablet, Rfl: 2    metFORMIN (GLUCOPHAGE) 1000 MG tablet, Take 1 tablet by mouth 2 (Two) Times a Day With Meals. HOLD x 2 days due to recent contrast, Disp: , Rfl:     omeprazole (priLOSEC) 40 MG capsule, TAKE ONE CAPSULE BY MOUTH 30 MINUTES BEFORE BREAKFAST DAILY. Needs office visit for further refills. (Patient taking differently: 1 capsule Daily. TAKE ONE CAPSULE BY MOUTH 30 MINUTES BEFORE BREAKFAST DAILY. Needs office visit for further refills.), Disp: 30 capsule, Rfl: 0    promethazine (PHENERGAN) 12.5 MG tablet, TAKE ONE TABLET BY MOUTH EVERY 8 HOURS AS NEEDED FOR NAUSEA AND VOMITING, Disp: 30 tablet, Rfl: 0    rosuvastatin (CRESTOR) 40 MG tablet, Take 1 tablet by mouth Daily., Disp: , Rfl:     venlafaxine XR (EFFEXOR-XR) 75 MG 24 hr capsule, 1 capsule Daily., Disp: , Rfl:     VITAMIN D, CHOLECALCIFEROL, PO, Take 1,000 Units by mouth Daily., Disp: , Rfl:     Allergies:   Allergies   Allergen Reactions    Dust Mite Extract Cough    Grass Cough    Latex Hives     AND ITCHING    Pineapple Itching    Rye Cough    Tuberculin Tests Itching    Wheat Cough       Objective     Physical Exam:  Vital Signs:   Vitals:    10/18/24 1036   BP: 112/60   Pulse: 85   Resp: 18   SpO2: 97%   Weight: 72.1 kg (159 lb)   Height: 167.6 cm (65.98\")       Physical Exam  Vitals and nursing note reviewed.   Constitutional:       Appearance: She is well-developed.   HENT:      Head: Normocephalic and atraumatic.      Right Ear: External ear normal.      Left Ear: External ear normal.      Nose: Nose normal. No " congestion or rhinorrhea.      Mouth/Throat:      Mouth: Mucous membranes are moist.      Pharynx: Oropharynx is clear. No oropharyngeal exudate or posterior oropharyngeal erythema.   Eyes:      General: No scleral icterus.        Right eye: No discharge.         Left eye: No discharge.      Extraocular Movements: Extraocular movements intact.      Conjunctiva/sclera: Conjunctivae normal.   Neck:      Trachea: No tracheal deviation.   Cardiovascular:      Rate and Rhythm: Normal rate and regular rhythm.      Heart sounds: No murmur heard.  Pulmonary:      Effort: No respiratory distress.      Breath sounds: No wheezing.   Abdominal:      General: There is no distension.      Palpations: Abdomen is soft.   Musculoskeletal:         General: No tenderness. Normal range of motion.      Cervical back: Normal range of motion and neck supple.      Right lower leg: No edema.      Left lower leg: No edema.   Skin:     General: Skin is warm and dry.      Findings: No rash.   Neurological:      Mental Status: She is alert and oriented to person, place, and time.      Coordination: Coordination normal.   Psychiatric:         Mood and Affect: Mood normal.         Judgment: Judgment normal.       Mallampati Score: II (hard and soft palate, upper portion of tonsils anduvula visible)    Results Review:   Labs: Reviewed.  Lab Results   Component Value Date    WBC 4.96 06/20/2024    HGB 11.2 (L) 06/20/2024    HCT 34.2 06/20/2024    MCV 92.7 06/20/2024     06/20/2024     Lab Results   Component Value Date    GLUCOSE 123 (H) 06/20/2024    BUN 13 06/20/2024    CREATININE 1.18 (H) 06/20/2024     06/20/2024    K 4.0 06/20/2024     (H) 06/20/2024    CALCIUM 8.9 06/20/2024    PROTEINTOT 7.2 06/18/2024    ALBUMIN 4.4 06/18/2024    ALT 16 06/18/2024    AST 24 06/18/2024    ALKPHOS 47 06/18/2024    BILITOT 1.0 06/18/2024    GLOB 2.8 06/18/2024    AGRATIO 1.6 06/18/2024    BCR 11.0 06/20/2024    ANIONGAP 8.8 06/20/2024     "EGFR 48.3 (L) 06/20/2024       No results found for: \"CBCDIF\", \"CMP\"     Micro: As of October 18, 2024   No results found for: \"RESPCX\"  Lab Results   Component Value Date    BLOODCX No growth at 5 days 08/31/2023    BLOODCX No growth at 5 days 08/31/2023     No results found for: \"URINECX\"  No results found for: \"MRSACX\"  No results found for: \"MRSAPCR\"  No results found for: \"URCX\"  No components found for: \"LOWRESPCF\"  No results found for: \"THROATCX\"  No results found for: \"CULTURES\"  No components found for: \"STREPBCX\"  No results found for: \"STREPPNEUAG\"  No results found for: \"LEGIONELLA\"  No results found for: \"MYCOPLASCX\"  No results found for: \"GCCX\"  No results found for: \"WOUNDCX\"  No results found for: \"BODYFLDCX\"    ABG: No results found for: \"PHART\", \"BLA8INI\", \"PO2ART\", \"HGBBG\", \"R0LCSEHF\", \"CFIO2\", \"FCOHB\", \"CARBOXYHGB\", \"FMETHB\"    Echo: Results for orders placed during the hospital encounter of 06/18/24    Adult Transthoracic Echo Complete W/ Cont if Necessary Per Protocol    Interpretation Summary    Left ventricular systolic function is normal. Left ventricular ejection fraction appears to be 61 - 65%. Left ventricular diastolic function was normal.    The right ventricular cavity is borderline dilated with normal systolic function.    Moderate aortic valve regurgitation is present.      Results for orders placed in visit on 01/09/24    Adult Transthoracic Echo Complete W/ Cont if Necessary Per Protocol    Interpretation Summary    Left ventricular systolic function is normal. Estimated left ventricular EF = 66% Left ventricular ejection fraction appears to be 66 - 70%.    Left ventricular diastolic function is consistent with age.    Estimated right ventricular systolic pressure from tricuspid regurgitation is normal (<35 mmHg).      Results for orders placed during the hospital encounter of 04/22/19    Adult Transthoracic Echo Complete W/ Cont if Necessary Per Protocol    Interpretation " Summary  · Estimated EF = 65%.  · Left ventricular systolic function is normal.  · Mild aortic valve regurgitation is present.  · Calculated right ventricular systolic pressure from tricuspid regurgitation is 28 mmHg.      Radiology Scans:   Last imaging was reviewed in great detail with the patient. Images reviewed personally.     August 2023 CT Abd showed lung bases which were clear.     June 2024 CXR     CLINICAL HISTORY:  SOA     FINDINGS:  COMPARISON: None   FINDINGS: The heart size is normal. The  mediastinum is normal. There is no focal infiltrate or edema.  There are no pleural effusions. There is no pneumothorax. There  is no osseous abnormality.     IMPRESSION:  No acute cardiopulmonary process.     Authenticated and Electronically Signed by Kim Romero MD on  06/18/2024 07:01:21 PM    Cardiology:  June 2024 heart cath showed angiographically normal coronaries.  Trace to mild aortic regurg.  June 2024 echo showed EF 65%.  Right ventricular cavity is borderline dilated with normal systolic function.  Moderate aortic valve regurgitation.    December 2023 nuclear medicine stress test showed normal stress EKG.  No perfusion defects noted.    PFT IMPRESSION:   October 2024 PFT showed FEV1 100%, fev1/fvc 76. No significant bronchodilator response. No hyperinflation or air trapping. DL/VA 95%.   Assessment / Plan      Assessment/Plan:    1. Dyspnea on exertion  Unremarkable imaging.  Patient keeps asking if there is scarring on her lungs, but I reviewed most recent chest x-ray as well as old CT scans and no evidence of any scarring or interstitial lung disease.  Discussed that PFTs are unremarkable and in fact show excellent lung function.  No need for inhalers.    2. Chronic cough  Most likely related to chronic upper airway cough syndrome from allergies.  Continue current regimen    3. Seasonal allergic rhinitis due to pollen  Continue current regimen    No further pulmonary workup needed at this time.            Follow Up:   Return if symptoms worsen or fail to improve.    Opal Lazcano MD  Pulmonary/Critical Care Physician   [place]    This is electronically signed by Opal Lazcano MD  09/06/2024 10:22 EDT       Please note that portions of this note may have been completed with a voice recognition program. Efforts were made to edit the dictations, but occasionally words are mistranscribed.

## 2024-11-12 ENCOUNTER — HOSPITAL ENCOUNTER (OUTPATIENT)
Dept: MRI IMAGING | Facility: HOSPITAL | Age: 74
Discharge: HOME | End: 2024-11-12
Admitting: PHYSICIAN ASSISTANT
Payer: MEDICARE

## 2024-11-12 PROCEDURE — 73721 MRI JNT OF LWR EXTRE W/O DYE: CPT

## 2024-11-25 ENCOUNTER — OFFICE VISIT (OUTPATIENT)
Dept: ORTHOPEDIC SURGERY | Facility: CLINIC | Age: 74
End: 2024-11-25
Payer: MEDICARE

## 2024-11-25 VITALS
WEIGHT: 164 LBS | SYSTOLIC BLOOD PRESSURE: 132 MMHG | BODY MASS INDEX: 26.36 KG/M2 | DIASTOLIC BLOOD PRESSURE: 82 MMHG | HEIGHT: 66 IN

## 2024-11-25 DIAGNOSIS — S83.242A TEAR OF MEDIAL MENISCUS OF LEFT KNEE, CURRENT, UNSPECIFIED TEAR TYPE, INITIAL ENCOUNTER: ICD-10-CM

## 2024-11-25 DIAGNOSIS — M22.42 PATELLA, CHONDROMALACIA, LEFT: ICD-10-CM

## 2024-11-25 DIAGNOSIS — M17.12 ARTHRITIS OF KNEE, LEFT: Primary | ICD-10-CM

## 2024-11-25 DIAGNOSIS — M71.22 POPLITEAL CYST, LEFT: ICD-10-CM

## 2024-11-25 RX ORDER — GLIMEPIRIDE 2 MG/1
TABLET ORAL
COMMUNITY
Start: 2024-11-24
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
## 2024-12-04 ENCOUNTER — PREP FOR SURGERY (OUTPATIENT)
Dept: OTHER | Facility: HOSPITAL | Age: 74
End: 2024-12-04
Payer: MEDICARE

## 2024-12-04 DIAGNOSIS — S83.242A TEAR OF MEDIAL MENISCUS OF LEFT KNEE, CURRENT, UNSPECIFIED TEAR TYPE, INITIAL ENCOUNTER: Primary | ICD-10-CM

## 2024-12-04 DIAGNOSIS — M22.42 PATELLA, CHONDROMALACIA, LEFT: ICD-10-CM

## 2024-12-04 RX ORDER — FAMOTIDINE 10 MG/ML
20 INJECTION, SOLUTION INTRAVENOUS
OUTPATIENT
Start: 2024-12-05 | End: 2024-12-06

## 2024-12-08 ENCOUNTER — APPOINTMENT (OUTPATIENT)
Dept: GENERAL RADIOLOGY | Facility: HOSPITAL | Age: 74
DRG: 684 | End: 2024-12-08
Payer: MEDICARE

## 2024-12-08 ENCOUNTER — HOSPITAL ENCOUNTER (INPATIENT)
Facility: HOSPITAL | Age: 74
LOS: 7 days | Discharge: HOME | DRG: 684 | End: 2024-12-15
Attending: EMERGENCY MEDICINE | Admitting: STUDENT IN AN ORGANIZED HEALTH CARE EDUCATION/TRAINING PROGRAM
Payer: MEDICARE

## 2024-12-08 ENCOUNTER — APPOINTMENT (OUTPATIENT)
Dept: CT IMAGING | Facility: HOSPITAL | Age: 74
DRG: 684 | End: 2024-12-08
Payer: MEDICARE

## 2024-12-08 DIAGNOSIS — N17.9 ACUTE RENAL FAILURE, UNSPECIFIED ACUTE RENAL FAILURE TYPE: Primary | ICD-10-CM

## 2024-12-08 DIAGNOSIS — R74.01 TRANSAMINITIS: ICD-10-CM

## 2024-12-08 LAB
ALBUMIN SERPL-MCNC: 3.9 G/DL (ref 3.5–5.2)
ALBUMIN/GLOB SERPL: 1.3 G/DL
ALP SERPL-CCNC: 147 U/L (ref 39–117)
ALT SERPL W P-5'-P-CCNC: 353 U/L (ref 1–33)
AMORPH URATE CRY URNS QL MICRO: ABNORMAL /HPF
ANION GAP SERPL CALCULATED.3IONS-SCNC: 19 MMOL/L (ref 5–15)
APAP SERPL-MCNC: <5 MCG/ML (ref 0–30)
AST SERPL-CCNC: 113 U/L (ref 1–32)
B PARAPERT DNA SPEC QL NAA+PROBE: NOT DETECTED
B PERT DNA SPEC QL NAA+PROBE: NOT DETECTED
BACTERIA UR QL AUTO: ABNORMAL /HPF
BASOPHILS # BLD AUTO: 0.02 10*3/MM3 (ref 0–0.2)
BASOPHILS NFR BLD AUTO: 0.4 % (ref 0–1.5)
BILIRUB SERPL-MCNC: 0.5 MG/DL (ref 0–1.2)
BILIRUB UR QL STRIP: NEGATIVE
BUN SERPL-MCNC: 52 MG/DL (ref 8–23)
BUN/CREAT SERPL: 7.6 (ref 7–25)
C PNEUM DNA NPH QL NAA+NON-PROBE: NOT DETECTED
CALCIUM SPEC-SCNC: 9.1 MG/DL (ref 8.6–10.5)
CHLORIDE SERPL-SCNC: 94 MMOL/L (ref 98–107)
CK SERPL-CCNC: 113 U/L (ref 20–180)
CLARITY UR: ABNORMAL
CO2 SERPL-SCNC: 19 MMOL/L (ref 22–29)
COLOR UR: YELLOW
CREAT SERPL-MCNC: 6.84 MG/DL (ref 0.57–1)
D-LACTATE SERPL-SCNC: 1.6 MMOL/L (ref 0.5–2)
DEPRECATED RDW RBC AUTO: 44.8 FL (ref 37–54)
EGFRCR SERPLBLD CKD-EPI 2021: 5.8 ML/MIN/1.73
EOSINOPHIL # BLD AUTO: 0.08 10*3/MM3 (ref 0–0.4)
EOSINOPHIL NFR BLD AUTO: 1.6 % (ref 0.3–6.2)
ERYTHROCYTE [DISTWIDTH] IN BLOOD BY AUTOMATED COUNT: 13.3 % (ref 12.3–15.4)
FLUAV SUBTYP SPEC NAA+PROBE: NOT DETECTED
FLUBV RNA ISLT QL NAA+PROBE: NOT DETECTED
GEN 5 1HR TROPONIN T REFLEX: 18 NG/L
GLOBULIN UR ELPH-MCNC: 3.1 GM/DL
GLUCOSE BLDC GLUCOMTR-MCNC: 202 MG/DL (ref 70–130)
GLUCOSE BLDC GLUCOMTR-MCNC: 99 MG/DL (ref 70–130)
GLUCOSE SERPL-MCNC: 168 MG/DL (ref 65–99)
GLUCOSE UR STRIP-MCNC: NEGATIVE MG/DL
HADV DNA SPEC NAA+PROBE: NOT DETECTED
HAV IGM SERPL QL IA: NORMAL
HBA1C MFR BLD: 9.2 % (ref 4.8–5.6)
HBV CORE IGM SERPL QL IA: NORMAL
HBV SURFACE AG SERPL QL IA: NORMAL
HCOV 229E RNA SPEC QL NAA+PROBE: NOT DETECTED
HCOV HKU1 RNA SPEC QL NAA+PROBE: NOT DETECTED
HCOV NL63 RNA SPEC QL NAA+PROBE: NOT DETECTED
HCOV OC43 RNA SPEC QL NAA+PROBE: NOT DETECTED
HCT VFR BLD AUTO: 32.9 % (ref 34–46.6)
HCV AB SER QL: NORMAL
HETEROPH AB SER QL LA: NEGATIVE
HGB BLD-MCNC: 11.1 G/DL (ref 12–15.9)
HGB UR QL STRIP.AUTO: ABNORMAL
HMPV RNA NPH QL NAA+NON-PROBE: NOT DETECTED
HPIV1 RNA ISLT QL NAA+PROBE: NOT DETECTED
HPIV2 RNA SPEC QL NAA+PROBE: NOT DETECTED
HPIV3 RNA NPH QL NAA+PROBE: NOT DETECTED
HPIV4 P GENE NPH QL NAA+PROBE: NOT DETECTED
HYALINE CASTS UR QL AUTO: ABNORMAL /LPF
IMM GRANULOCYTES # BLD AUTO: 0.03 10*3/MM3 (ref 0–0.05)
IMM GRANULOCYTES NFR BLD AUTO: 0.6 % (ref 0–0.5)
KETONES UR QL STRIP: NEGATIVE
LEUKOCYTE ESTERASE UR QL STRIP.AUTO: NEGATIVE
LYMPHOCYTES # BLD AUTO: 0.82 10*3/MM3 (ref 0.7–3.1)
LYMPHOCYTES NFR BLD AUTO: 16.3 % (ref 19.6–45.3)
M PNEUMO IGG SER IA-ACNC: NOT DETECTED
MCH RBC QN AUTO: 30.7 PG (ref 26.6–33)
MCHC RBC AUTO-ENTMCNC: 33.7 G/DL (ref 31.5–35.7)
MCV RBC AUTO: 90.9 FL (ref 79–97)
MONOCYTES # BLD AUTO: 0.52 10*3/MM3 (ref 0.1–0.9)
MONOCYTES NFR BLD AUTO: 10.3 % (ref 5–12)
NEUTROPHILS NFR BLD AUTO: 3.57 10*3/MM3 (ref 1.7–7)
NEUTROPHILS NFR BLD AUTO: 70.8 % (ref 42.7–76)
NITRITE UR QL STRIP: NEGATIVE
NRBC BLD AUTO-RTO: 0 /100 WBC (ref 0–0.2)
NT-PROBNP SERPL-MCNC: 4009 PG/ML (ref 0–1800)
PH UR STRIP.AUTO: <=5 [PH] (ref 5–8)
PLATELET # BLD AUTO: 203 10*3/MM3 (ref 140–450)
PMV BLD AUTO: 10 FL (ref 6–12)
POTASSIUM SERPL-SCNC: 4.4 MMOL/L (ref 3.5–5.2)
PROCALCITONIN SERPL-MCNC: 4.83 NG/ML (ref 0–0.25)
PROT SERPL-MCNC: 7 G/DL (ref 6–8.5)
PROT UR QL STRIP: ABNORMAL
RBC # BLD AUTO: 3.62 10*6/MM3 (ref 3.77–5.28)
RBC # UR STRIP: ABNORMAL /HPF
REF LAB TEST METHOD: ABNORMAL
RHINOVIRUS RNA SPEC NAA+PROBE: NOT DETECTED
RSV RNA NPH QL NAA+NON-PROBE: NOT DETECTED
SARS-COV-2 RNA NPH QL NAA+NON-PROBE: NOT DETECTED
SODIUM SERPL-SCNC: 132 MMOL/L (ref 136–145)
SP GR UR STRIP: 1.01 (ref 1–1.03)
SQUAMOUS #/AREA URNS HPF: ABNORMAL /HPF
STARCH GRANULES URNS QL MICRO: ABNORMAL /HPF
TROPONIN T NUMERIC DELTA: -3 NG/L
TROPONIN T SERPL HS-MCNC: 21 NG/L
UROBILINOGEN UR QL STRIP: ABNORMAL
WBC # UR STRIP: ABNORMAL /HPF
WBC NRBC COR # BLD AUTO: 5.04 10*3/MM3 (ref 3.4–10.8)

## 2024-12-08 PROCEDURE — 74176 CT ABD & PELVIS W/O CONTRAST: CPT

## 2024-12-08 PROCEDURE — 83880 ASSAY OF NATRIURETIC PEPTIDE: CPT | Performed by: EMERGENCY MEDICINE

## 2024-12-08 PROCEDURE — 85025 COMPLETE CBC W/AUTO DIFF WBC: CPT | Performed by: EMERGENCY MEDICINE

## 2024-12-08 PROCEDURE — 83036 HEMOGLOBIN GLYCOSYLATED A1C: CPT | Performed by: INTERNAL MEDICINE

## 2024-12-08 PROCEDURE — 36415 COLL VENOUS BLD VENIPUNCTURE: CPT

## 2024-12-08 PROCEDURE — 84145 PROCALCITONIN (PCT): CPT | Performed by: EMERGENCY MEDICINE

## 2024-12-08 PROCEDURE — 80143 DRUG ASSAY ACETAMINOPHEN: CPT | Performed by: EMERGENCY MEDICINE

## 2024-12-08 PROCEDURE — 87040 BLOOD CULTURE FOR BACTERIA: CPT | Performed by: EMERGENCY MEDICINE

## 2024-12-08 PROCEDURE — 0202U NFCT DS 22 TRGT SARS-COV-2: CPT | Performed by: EMERGENCY MEDICINE

## 2024-12-08 PROCEDURE — 86308 HETEROPHILE ANTIBODY SCREEN: CPT | Performed by: EMERGENCY MEDICINE

## 2024-12-08 PROCEDURE — 25810000003 SODIUM CHLORIDE 0.9 % SOLUTION: Performed by: EMERGENCY MEDICINE

## 2024-12-08 PROCEDURE — 25010000002 HEPARIN (PORCINE) PER 1000 UNITS: Performed by: INTERNAL MEDICINE

## 2024-12-08 PROCEDURE — 84484 ASSAY OF TROPONIN QUANT: CPT | Performed by: EMERGENCY MEDICINE

## 2024-12-08 PROCEDURE — 25010000002 CEFTRIAXONE PER 250 MG: Performed by: INTERNAL MEDICINE

## 2024-12-08 PROCEDURE — 99223 1ST HOSP IP/OBS HIGH 75: CPT | Performed by: INTERNAL MEDICINE

## 2024-12-08 PROCEDURE — 25010000002 PIPERACILLIN SOD-TAZOBACTAM PER 1 G: Performed by: EMERGENCY MEDICINE

## 2024-12-08 PROCEDURE — 82948 REAGENT STRIP/BLOOD GLUCOSE: CPT

## 2024-12-08 PROCEDURE — 81001 URINALYSIS AUTO W/SCOPE: CPT | Performed by: EMERGENCY MEDICINE

## 2024-12-08 PROCEDURE — 99291 CRITICAL CARE FIRST HOUR: CPT | Performed by: EMERGENCY MEDICINE

## 2024-12-08 PROCEDURE — 80053 COMPREHEN METABOLIC PANEL: CPT | Performed by: EMERGENCY MEDICINE

## 2024-12-08 PROCEDURE — 82550 ASSAY OF CK (CPK): CPT | Performed by: EMERGENCY MEDICINE

## 2024-12-08 PROCEDURE — 80074 ACUTE HEPATITIS PANEL: CPT | Performed by: EMERGENCY MEDICINE

## 2024-12-08 PROCEDURE — 93005 ELECTROCARDIOGRAM TRACING: CPT | Performed by: EMERGENCY MEDICINE

## 2024-12-08 PROCEDURE — 63710000001 INSULIN LISPRO (HUMAN) PER 5 UNITS: Performed by: INTERNAL MEDICINE

## 2024-12-08 PROCEDURE — 83605 ASSAY OF LACTIC ACID: CPT | Performed by: EMERGENCY MEDICINE

## 2024-12-08 PROCEDURE — 71046 X-RAY EXAM CHEST 2 VIEWS: CPT

## 2024-12-08 PROCEDURE — 25810000003 SODIUM CHLORIDE 0.9 % SOLUTION: Performed by: INTERNAL MEDICINE

## 2024-12-08 RX ORDER — HEPARIN SODIUM 5000 [USP'U]/ML
5000 INJECTION, SOLUTION INTRAVENOUS; SUBCUTANEOUS EVERY 12 HOURS SCHEDULED
Status: DISCONTINUED | OUTPATIENT
Start: 2024-12-08 | End: 2024-12-15 | Stop reason: HOSPADM

## 2024-12-08 RX ORDER — AMOXICILLIN 250 MG
2 CAPSULE ORAL 2 TIMES DAILY PRN
Status: DISCONTINUED | OUTPATIENT
Start: 2024-12-08 | End: 2024-12-15 | Stop reason: HOSPADM

## 2024-12-08 RX ORDER — ASPIRIN 81 MG/1
81 TABLET, CHEWABLE ORAL DAILY
Status: DISCONTINUED | OUTPATIENT
Start: 2024-12-09 | End: 2024-12-15 | Stop reason: HOSPADM

## 2024-12-08 RX ORDER — L.ACID,PARA/B.BIFIDUM/S.THERM 8B CELL
1 CAPSULE ORAL 2 TIMES DAILY
Status: DISCONTINUED | OUTPATIENT
Start: 2024-12-08 | End: 2024-12-15 | Stop reason: HOSPADM

## 2024-12-08 RX ORDER — BISACODYL 10 MG
10 SUPPOSITORY, RECTAL RECTAL DAILY PRN
Status: DISCONTINUED | OUTPATIENT
Start: 2024-12-08 | End: 2024-12-15 | Stop reason: HOSPADM

## 2024-12-08 RX ORDER — SODIUM CHLORIDE 9 MG/ML
40 INJECTION, SOLUTION INTRAVENOUS AS NEEDED
Status: DISCONTINUED | OUTPATIENT
Start: 2024-12-08 | End: 2024-12-15 | Stop reason: HOSPADM

## 2024-12-08 RX ORDER — SODIUM CHLORIDE 0.9 % (FLUSH) 0.9 %
10 SYRINGE (ML) INJECTION EVERY 12 HOURS SCHEDULED
Status: DISCONTINUED | OUTPATIENT
Start: 2024-12-08 | End: 2024-12-15 | Stop reason: HOSPADM

## 2024-12-08 RX ORDER — PANTOPRAZOLE SODIUM 40 MG/1
40 TABLET, DELAYED RELEASE ORAL
Status: DISCONTINUED | OUTPATIENT
Start: 2024-12-09 | End: 2024-12-15 | Stop reason: HOSPADM

## 2024-12-08 RX ORDER — ACETAMINOPHEN 160 MG/5ML
650 SOLUTION ORAL EVERY 4 HOURS PRN
Status: DISCONTINUED | OUTPATIENT
Start: 2024-12-08 | End: 2024-12-15 | Stop reason: HOSPADM

## 2024-12-08 RX ORDER — ONDANSETRON 2 MG/ML
4 INJECTION INTRAMUSCULAR; INTRAVENOUS ONCE
Status: DISCONTINUED | OUTPATIENT
Start: 2024-12-08 | End: 2024-12-08

## 2024-12-08 RX ORDER — ONDANSETRON 2 MG/ML
4 INJECTION INTRAMUSCULAR; INTRAVENOUS EVERY 6 HOURS PRN
Status: DISCONTINUED | OUTPATIENT
Start: 2024-12-08 | End: 2024-12-15 | Stop reason: HOSPADM

## 2024-12-08 RX ORDER — POLYETHYLENE GLYCOL 3350 17 G/17G
17 POWDER, FOR SOLUTION ORAL DAILY PRN
Status: DISCONTINUED | OUTPATIENT
Start: 2024-12-08 | End: 2024-12-15 | Stop reason: HOSPADM

## 2024-12-08 RX ORDER — NICOTINE POLACRILEX 4 MG
15 LOZENGE BUCCAL
Status: DISCONTINUED | OUTPATIENT
Start: 2024-12-08 | End: 2024-12-15 | Stop reason: HOSPADM

## 2024-12-08 RX ORDER — INSULIN LISPRO 100 [IU]/ML
2-7 INJECTION, SOLUTION INTRAVENOUS; SUBCUTANEOUS
Status: DISCONTINUED | OUTPATIENT
Start: 2024-12-08 | End: 2024-12-15 | Stop reason: HOSPADM

## 2024-12-08 RX ORDER — VENLAFAXINE HYDROCHLORIDE 37.5 MG/1
37.5 CAPSULE, EXTENDED RELEASE ORAL DAILY
Status: DISCONTINUED | OUTPATIENT
Start: 2024-12-09 | End: 2024-12-15 | Stop reason: HOSPADM

## 2024-12-08 RX ORDER — SODIUM CHLORIDE 0.9 % (FLUSH) 0.9 %
10 SYRINGE (ML) INJECTION AS NEEDED
Status: DISCONTINUED | OUTPATIENT
Start: 2024-12-08 | End: 2024-12-15 | Stop reason: HOSPADM

## 2024-12-08 RX ORDER — BISACODYL 5 MG/1
5 TABLET, DELAYED RELEASE ORAL DAILY PRN
Status: DISCONTINUED | OUTPATIENT
Start: 2024-12-08 | End: 2024-12-15 | Stop reason: HOSPADM

## 2024-12-08 RX ORDER — SODIUM CHLORIDE 9 MG/ML
100 INJECTION, SOLUTION INTRAVENOUS CONTINUOUS
Status: ACTIVE | OUTPATIENT
Start: 2024-12-08 | End: 2024-12-09

## 2024-12-08 RX ORDER — HYDROCODONE BITARTRATE AND ACETAMINOPHEN 7.5; 325 MG/1; MG/1
1 TABLET ORAL EVERY 6 HOURS PRN
Status: DISPENSED | OUTPATIENT
Start: 2024-12-08 | End: 2024-12-13

## 2024-12-08 RX ORDER — PROMETHAZINE HYDROCHLORIDE 12.5 MG/1
12.5 TABLET ORAL EVERY 8 HOURS PRN
Status: DISCONTINUED | OUTPATIENT
Start: 2024-12-08 | End: 2024-12-15 | Stop reason: HOSPADM

## 2024-12-08 RX ORDER — ACETAMINOPHEN 650 MG/1
650 SUPPOSITORY RECTAL EVERY 4 HOURS PRN
Status: DISCONTINUED | OUTPATIENT
Start: 2024-12-08 | End: 2024-12-15 | Stop reason: HOSPADM

## 2024-12-08 RX ORDER — DEXTROSE MONOHYDRATE 25 G/50ML
25 INJECTION, SOLUTION INTRAVENOUS
Status: DISCONTINUED | OUTPATIENT
Start: 2024-12-08 | End: 2024-12-15 | Stop reason: HOSPADM

## 2024-12-08 RX ORDER — LEVOTHYROXINE SODIUM 25 UG/1
25 TABLET ORAL DAILY
Status: DISCONTINUED | OUTPATIENT
Start: 2024-12-09 | End: 2024-12-15 | Stop reason: HOSPADM

## 2024-12-08 RX ORDER — ACETAMINOPHEN 325 MG/1
650 TABLET ORAL EVERY 4 HOURS PRN
Status: DISCONTINUED | OUTPATIENT
Start: 2024-12-08 | End: 2024-12-15 | Stop reason: HOSPADM

## 2024-12-08 RX ADMIN — Medication 10 ML: at 21:26

## 2024-12-08 RX ADMIN — HEPARIN SODIUM 5000 UNITS: 5000 INJECTION INTRAVENOUS; SUBCUTANEOUS at 21:24

## 2024-12-08 RX ADMIN — INSULIN LISPRO 3 UNITS: 100 INJECTION, SOLUTION INTRAVENOUS; SUBCUTANEOUS at 21:25

## 2024-12-08 RX ADMIN — HYDROCODONE BITARTRATE AND ACETAMINOPHEN 1 TABLET: 7.5; 325 TABLET ORAL at 17:11

## 2024-12-08 RX ADMIN — SODIUM CHLORIDE 100 ML/HR: 9 INJECTION, SOLUTION INTRAVENOUS at 17:09

## 2024-12-08 RX ADMIN — Medication 1 CAPSULE: at 21:24

## 2024-12-08 RX ADMIN — PIPERACILLIN SODIUM AND TAZOBACTAM SODIUM 3.38 G: 3; .375 INJECTION, POWDER, LYOPHILIZED, FOR SOLUTION INTRAVENOUS at 15:23

## 2024-12-08 RX ADMIN — SODIUM CHLORIDE 2000 MG: 9 INJECTION, SOLUTION INTRAVENOUS at 17:11

## 2024-12-08 RX ADMIN — SODIUM CHLORIDE 1000 ML: 9 INJECTION, SOLUTION INTRAVENOUS at 13:33

## 2024-12-08 RX ADMIN — SODIUM CHLORIDE 1000 ML: 9 INJECTION, SOLUTION INTRAVENOUS at 14:38
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
## 2024-12-08 NOTE — Clinical Note
Level of Care: Telemetry [5]   Diagnosis: GORGE (acute kidney injury) [682248]   Admitting Physician: MEG MCNEILL [[id number]   Certification: I Certify That Inpatient Hospital Services Are Medically Necessary For Greater Than 2 Midnights
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
## 2024-12-09 LAB
ANION GAP SERPL CALCULATED.3IONS-SCNC: 14.8 MMOL/L (ref 5–15)
BUN SERPL-MCNC: 48 MG/DL (ref 8–23)
BUN/CREAT SERPL: 7.4 (ref 7–25)
CALCIUM SPEC-SCNC: 8.1 MG/DL (ref 8.6–10.5)
CHLORIDE SERPL-SCNC: 107 MMOL/L (ref 98–107)
CO2 SERPL-SCNC: 17.2 MMOL/L (ref 22–29)
CREAT SERPL-MCNC: 6.49 MG/DL (ref 0.57–1)
DEPRECATED RDW RBC AUTO: 47.2 FL (ref 37–54)
EGFRCR SERPLBLD CKD-EPI 2021: 6.2 ML/MIN/1.73
ERYTHROCYTE [DISTWIDTH] IN BLOOD BY AUTOMATED COUNT: 13.8 % (ref 12.3–15.4)
GLUCOSE BLDC GLUCOMTR-MCNC: 149 MG/DL (ref 70–130)
GLUCOSE BLDC GLUCOMTR-MCNC: 152 MG/DL (ref 70–130)
GLUCOSE BLDC GLUCOMTR-MCNC: 164 MG/DL (ref 70–130)
GLUCOSE BLDC GLUCOMTR-MCNC: 92 MG/DL (ref 70–130)
GLUCOSE SERPL-MCNC: 95 MG/DL (ref 65–99)
HCT VFR BLD AUTO: 27.9 % (ref 34–46.6)
HGB BLD-MCNC: 9.1 G/DL (ref 12–15.9)
MCH RBC QN AUTO: 30.3 PG (ref 26.6–33)
MCHC RBC AUTO-ENTMCNC: 32.6 G/DL (ref 31.5–35.7)
MCV RBC AUTO: 93 FL (ref 79–97)
PLATELET # BLD AUTO: 186 10*3/MM3 (ref 140–450)
PMV BLD AUTO: 10.1 FL (ref 6–12)
POTASSIUM SERPL-SCNC: 4.3 MMOL/L (ref 3.5–5.2)
RBC # BLD AUTO: 3 10*6/MM3 (ref 3.77–5.28)
SODIUM SERPL-SCNC: 139 MMOL/L (ref 136–145)
SODIUM UR-SCNC: 68 MMOL/L
WBC NRBC COR # BLD AUTO: 4.84 10*3/MM3 (ref 3.4–10.8)

## 2024-12-09 PROCEDURE — 84300 ASSAY OF URINE SODIUM: CPT | Performed by: INTERNAL MEDICINE

## 2024-12-09 PROCEDURE — 82948 REAGENT STRIP/BLOOD GLUCOSE: CPT

## 2024-12-09 PROCEDURE — 63710000001 INSULIN LISPRO (HUMAN) PER 5 UNITS: Performed by: INTERNAL MEDICINE

## 2024-12-09 PROCEDURE — 25810000003 SODIUM CHLORIDE 0.9 % SOLUTION: Performed by: INTERNAL MEDICINE

## 2024-12-09 PROCEDURE — 25010000002 HEPARIN (PORCINE) PER 1000 UNITS: Performed by: INTERNAL MEDICINE

## 2024-12-09 PROCEDURE — 25010000002 CEFTRIAXONE PER 250 MG: Performed by: INTERNAL MEDICINE

## 2024-12-09 PROCEDURE — 80048 BASIC METABOLIC PNL TOTAL CA: CPT | Performed by: INTERNAL MEDICINE

## 2024-12-09 PROCEDURE — 99233 SBSQ HOSP IP/OBS HIGH 50: CPT | Performed by: INTERNAL MEDICINE

## 2024-12-09 PROCEDURE — 85027 COMPLETE CBC AUTOMATED: CPT | Performed by: INTERNAL MEDICINE

## 2024-12-09 RX ORDER — SODIUM CHLORIDE 9 MG/ML
100 INJECTION, SOLUTION INTRAVENOUS CONTINUOUS
Status: ACTIVE | OUTPATIENT
Start: 2024-12-09 | End: 2024-12-10

## 2024-12-09 RX ORDER — SODIUM BICARBONATE 650 MG/1
1300 TABLET ORAL 2 TIMES DAILY
Status: DISCONTINUED | OUTPATIENT
Start: 2024-12-09 | End: 2024-12-10

## 2024-12-09 RX ADMIN — SODIUM BICARBONATE 650 MG TABLET 1300 MG: at 09:31

## 2024-12-09 RX ADMIN — HYDROCODONE BITARTRATE AND ACETAMINOPHEN 1 TABLET: 7.5; 325 TABLET ORAL at 20:37

## 2024-12-09 RX ADMIN — PANTOPRAZOLE SODIUM 40 MG: 40 TABLET, DELAYED RELEASE ORAL at 06:12

## 2024-12-09 RX ADMIN — VENLAFAXINE HYDROCHLORIDE 37.5 MG: 37.5 CAPSULE, EXTENDED RELEASE ORAL at 09:08

## 2024-12-09 RX ADMIN — SODIUM CHLORIDE 100 ML/HR: 9 INJECTION, SOLUTION INTRAVENOUS at 16:48

## 2024-12-09 RX ADMIN — SODIUM CHLORIDE 100 ML/HR: 9 INJECTION, SOLUTION INTRAVENOUS at 12:47

## 2024-12-09 RX ADMIN — Medication 1 CAPSULE: at 20:37

## 2024-12-09 RX ADMIN — INSULIN LISPRO 2 UNITS: 100 INJECTION, SOLUTION INTRAVENOUS; SUBCUTANEOUS at 13:30

## 2024-12-09 RX ADMIN — ASPIRIN 81 MG CHEWABLE TABLET 81 MG: 81 TABLET CHEWABLE at 09:08

## 2024-12-09 RX ADMIN — INSULIN LISPRO 2 UNITS: 100 INJECTION, SOLUTION INTRAVENOUS; SUBCUTANEOUS at 20:36

## 2024-12-09 RX ADMIN — SODIUM CHLORIDE 2000 MG: 9 INJECTION, SOLUTION INTRAVENOUS at 16:48

## 2024-12-09 RX ADMIN — HEPARIN SODIUM 5000 UNITS: 5000 INJECTION INTRAVENOUS; SUBCUTANEOUS at 09:08

## 2024-12-09 RX ADMIN — HYDROCODONE BITARTRATE AND ACETAMINOPHEN 1 TABLET: 7.5; 325 TABLET ORAL at 02:35

## 2024-12-09 RX ADMIN — Medication 1 CAPSULE: at 09:08

## 2024-12-09 RX ADMIN — Medication 10 ML: at 20:37

## 2024-12-09 RX ADMIN — HEPARIN SODIUM 5000 UNITS: 5000 INJECTION INTRAVENOUS; SUBCUTANEOUS at 20:36

## 2024-12-09 RX ADMIN — SODIUM BICARBONATE 650 MG TABLET 1300 MG: at 20:37

## 2024-12-09 RX ADMIN — Medication 10 ML: at 09:09

## 2024-12-09 RX ADMIN — LEVOTHYROXINE SODIUM 25 MCG: 0.03 TABLET ORAL at 06:12

## 2024-12-09 NOTE — PROGRESS NOTES
[facility]    Name:  Latonya Mei   Age:  74 y.o.  Sex:  female  :  1950  MRN:  [id number]   Visit Number:  [id number]  Admission Date:  2024  Date Of Service:  24  Primary Care Physician:  Chrystal Buckley APRN     LOS: 1 day :    Chief Complaint:      Follow-up of renal failure.    Subjective:    Latonya Mei was seen and examined this morning.  She is currently sitting up on the bed and is comfortable at rest.  Unfortunately, renal function has not improved much despite being on IV fluids.  She is having good urine output in the Gross bag however and has had about 2100 mL of clear urine since yesterday.  Denies any chest pain but does have some cough.    Hospital Course:    Latonya Mei is a 74-year-old female with history of diabetes mellitus type 2, hypothyroidism, GERD, fibromyalgia was brought to the emergency room by her daughter with multiple symptoms including chills, abdominal discomfort, nausea, vomiting, cough and shortness of breath.  Patient states that she has had chronic left knee joint pain due to torn meniscus.  She has been following up with Dr. Vazquez and apparently has been scheduled for surgery.  She has been taking 2 tablets of Motrin/Tylenol as needed for the last 3 weeks.  4 days ago, she started having epigastric pain nausea, vomiting, chills and low-grade fevers.  She called her primary care provider and apparently was seen by her and urine was sent.  She was placed on ciprofloxacin.  Patient however has not had any relief of symptoms and came to the emergency room.     Patient was last admitted to this facility in 2024 with similar complaints and at that time she was noted to have mild GORGE.  Due to shortness of breath, she was seen by cardiology and did undergo cardiac catheterization which was negative for any significant coronary artery disease at that time.     In the emergency room, she was afebrile  and hemodynamically stable saturating at 97% on room air.  Initial CK was within normal range.  Troponin 21, proBNP 4000, sodium 132, CO2 19, anion gap 19, BUN 52, creatinine 6.84 (baseline 1.2), glucose 168, alkaline phosphatase 147, , .  Lactic acid was within normal range but procalcitonin is elevated at 4.83.  CBC was unremarkable except for a hemoglobin of 11 (baseline).  Monospot test was negative.  Urine analysis shows 3-5 RBCs but otherwise negative for any evidence of urinary tract infection.  Respiratory panel was negative.  Chest x-ray showed no acute process.  CT of the abdomen and pelvis without contrast was negative for any hydronephrosis or nephrolithiasis.  It showed diverticulosis, benign left renal cyst.  EKG was unremarkable.  Patient was given 2 L of normal saline bolus, Zofran and Zosyn in the emergency room.  Blood cultures were done and acute hepatitis panel was sent.  She was subsequently admitted to the medical floor with telemetry secondary to dehydration and acute renal failure.    Patient was seen by Dr. Car from nephrology and was continued on IV fluids.  Due to elevated procalcitonin levels and cough, she was continued on ceftriaxone.    Review of Systems:     All systems were reviewed and negative except as mentioned in subjective, assessment and plan.    Vital Signs:    Temp:  [97.7 °F (36.5 °C)-98.2 °F (36.8 °C)] 97.8 °F (36.6 °C)  Heart Rate:  [69-81] 69  Resp:  [18] 18  BP: (135-157)/(59-71) 157/71    Intake and output:    I/O last 3 completed shifts:  In: 200 [P.O.:200]  Out: 2100 [Urine:2100]  I/O this shift:  In: 440 [P.O.:440]  Out: 1300 [Urine:1300]    Physical Examination:    General Appearance:  Alert and cooperative.    Head:  Atraumatic and normocephalic.   Eyes: Conjunctivae and sclerae normal, no icterus. No pallor.   Throat: No oral lesions, no thrush, oral mucosa moist.   Neck: Supple, trachea midline, no thyromegaly.   Lungs:   Breath sounds heard  bilaterally equally.  No wheezing or crackles. No Pleural rub or bronchial breathing.   Heart:  Normal S1 and S2, no murmur, no gallop, no rub. No JVD.   Abdomen:   Normal bowel sounds, no masses, no organomegaly. Soft, nontender, obese, no rebound tenderness.  Gross catheter is in place.   Extremities: Supple, no edema, no cyanosis, no clubbing.   Skin: No bleeding or rash.   Neurologic: Alert and oriented x 3. No facial asymmetry. Moves all four limbs. No tremors.    Laboratory results:    Results from last 7 days   Lab Units 12/09/24  0539 12/08/24  1333   SODIUM mmol/L 139 132*   POTASSIUM mmol/L 4.3 4.4   CHLORIDE mmol/L 107 94*   CO2 mmol/L 17.2* 19.0*   BUN mg/dL 48* 52*   CREATININE mg/dL 6.49* 6.84*   CALCIUM mg/dL 8.1* 9.1   BILIRUBIN mg/dL  --  0.5   ALK PHOS U/L  --  147*   ALT (SGPT) U/L  --  353*   AST (SGOT) U/L  --  113*   GLUCOSE mg/dL 95 168*     Results from last 7 days   Lab Units 12/09/24  0539 12/08/24  1333   WBC 10*3/mm3 4.84 5.04   HEMOGLOBIN g/dL 9.1* 11.1*   HEMATOCRIT % 27.9* 32.9*   PLATELETS 10*3/mm3 186 203       Results from last 7 days   Lab Units 12/08/24  1532 12/08/24  1333   CK TOTAL U/L  --  113   HSTROP T ng/L 18* 21*     I have reviewed the patient's laboratory results.    Radiology results:    CT Abdomen Pelvis Without Contrast    Result Date: 12/8/2024  PROCEDURE: CT ABDOMEN PELVIS WO CONTRAST-  HISTORY: abdominal pain, vomiting; N17.9-Acute kidney failure, unspecified  COMPARISON: August 31, 2023..  PROCEDURE: Axial images were obtained from the lung bases to the pubic symphysis by computed tomography. This study was performed with techniques to keep radiation doses as low as reasonably achievable, (ALARA). Individualized dose reduction techniques using automated exposure control or adjustment of mA and/or kV according to the patient size were employed.  FINDINGS:  ABDOMEN: The lung bases are clear. The heart is proper size. The limited non-contrast images of the liver are  unremarkable. Gallbladder present with no CT visible stones. The spleen is unremarkable. No adrenal masses are seen. The aorta is normal in caliber. There is no significant free fluid or adenopathy.  There is no nephrolithiasis. There is no hydronephrosis. There is an exophytic hypodense cyst arising from the posterior aspect of the left kidney and a larger cyst arising from the inferior pole of the left kidney. Vascular calcifications noted. Gallbladder present with no CT visible stones.  PELVIS: The GI tract demonstrate no obstruction. The appendix is questionably identified as a narrow tubular structure measuring 24 mm arising from the posterior aspect of the cecum. No secondary signs of appendicitis seen. The urinary bladder is partially collapsed with no abnormality seen. Uterus is diminutive or absent. There is diverticulosis without evidence of diverticulitis. There is no fluid or adenopathy.      Impression: No hydronephrosis or nephrolithiasis.  Diverticulosis.  Benign left renal cyst.   CTDI: 5.55 mGy DLP:247.9 mGy.cm  This report was signed and finalized on 12/8/2024 2:52 PM by Kim Romero MD.      XR Chest 2 View    Result Date: 12/8/2024  PROCEDURE: XR CHEST 2 VW-  HISTORY: cough; N17.9-Acute kidney failure, unspecified  COMPARISON: June 18, 2024..  FINDINGS: The heart is normal in size. The lungs are clear. The mediastinum is unremarkable. There is no pneumothorax.  There are no acute osseous abnormalities. Apical lordotic positioning noted.      Impression: No acute cardiopulmonary process.     This report was signed and finalized on 12/8/2024 2:39 PM by Kim Romero MD.     I have reviewed the patient's radiology reports.    Medication Review:     I have reviewed the patient's active and prn medications.     Problem List:      GORGE (acute kidney injury)    Type 2 diabetes mellitus without complication, without long-term current use of insulin    Gastroesophageal reflux disease    Assessment:    Acute  renal failure secondary to use of NSAIDs and dehydration, POA.  Acute viral gastroenteritis, POA, improving.  Acute transaminitis likely secondary to viral syndrome, POA.  Left knee joint osteoarthritis.  Acquired hypothyroidism.  Diabetes mellitus type 2.    Plan:    Acute kidney injury.  - Likely secondary to a combination of NSAIDs use and dehydration.  -Continue normal saline at 100 mL/h.  - Gross catheter has been placed in the emergency room.  - Plenty of oral fluids.  - I have discussed the patient's treatment plan with Dr. Car and he has been consulted.  - CT of the abdomen was negative for any hydronephrosis.     Acute viral gastroenteritis.  - Slowly improving.  - Continue IV hydration.  - Continue Protonix.  - Zofran for symptomatic control.     Cough/elevated procalcitonin.  - Patient does have a productive cough with chills.  Her chest x-ray is negative for any infiltrates but this could be related to dehydration.  - She does have elevated procalcitonin levels and we will continue her on antibiotics therapy with ceftriaxone (day 2/5).  - Lactobacillus supplements.  - Blood cultures have been negative so far.     Acute transaminitis.  - Exact etiology of this is uncertain but likely related to viral syndrome.  - Acute viral hepatitis panel is negative.  - Patient does have a gallbladder but no evidence of tenderness in the right upper quadrant.  - Rosuvastatin is currently on hold.  - Repeat liver function panel tomorrow.     Diabetes mellitus type 2.  - Continue subcutaneous insulin protocol for coverage.  - Hemoglobin A1c 9.2 on 12/8/2024.    I have discussed the patient's treatment plan with her daughter Dorene who is at the bedside.  Discussed with nursing staff and multidisciplinary team.    I have reviewed the copied text and it is accurate as of 12/09/24    DVT Prophylaxis: Heparin  Code Status: Full  Diet: Diabetic  Discharge Plan: Pending.    Juan Lovelace MD  12/09/24  15:20 EST    Dictated  utilizing Dragon dictation.

## 2024-12-09 NOTE — CASE MANAGEMENT/SOCIAL WORK
Discharge Planning Assessment  [facility]     Patient Name: Latonya Mei  MRN: [id number]  Today's Date: 12/9/2024    Admit Date: 12/8/2024    Plan: Home with family   Discharge Needs Assessment       Row Name 12/09/24 0914       Living Environment    People in Home spouse    Current Living Arrangements home    Potentially Unsafe Housing Conditions none    In the past 12 months has the electric, gas, oil, or water company threatened to shut off services in your home? No    Primary Care Provided by self    Provides Primary Care For no one    Able to Return to Prior Arrangements yes       Resource/Environmental Concerns    Resource/Environmental Concerns none    Transportation Concerns none       Transportation Needs    In the past 12 months, has lack of transportation kept you from medical appointments or from getting medications? no    In the past 12 months, has lack of transportation kept you from meetings, work, or from getting things needed for daily living? No       Food Insecurity    Within the past 12 months, you worried that your food would run out before you got the money to buy more. Never true    Within the past 12 months, the food you bought just didn't last and you didn't have money to get more. Never true       Transition Planning    Patient/Family Anticipates Transition to home with family    Patient/Family Anticipated Services at Transition none    Transportation Anticipated family or friend will provide       Discharge Needs Assessment    Readmission Within the Last 30 Days no previous admission in last 30 days    Equipment Currently Used at Home none    Concerns to be Addressed no discharge needs identified    Anticipated Changes Related to Illness none    Equipment Needed After Discharge none                   Discharge Plan       Row Name 12/09/24 0914       Plan    Plan Home with family    Patient/Family in Agreement with Plan yes    Plan Comments Met with patient at bedside.Verified  patient's address, phone number, contacts, physician and pharmacy. Patient has adequate transportation. Reports no financial or food insecurity. Patient lives with her spouse. She uses [facility], agreeable to meds to bed. Patient does not have or use DME. She plans to return home once medically ready, states no additional needs at this time.    Final Discharge Disposition Code 01 - home or self-care                  Continued Care and Services - Admitted Since 12/8/2024    No active coordination exists for this encounter.          Demographic Summary       Row Name 12/09/24 0913       General Information    Admission Type inpatient    Arrived From emergency department    Required Notices Provided Important Message from Medicare    Referral Source admission list    Reason for Consult discharge planning    Preferred Language English       Contact Information    Permission Granted to Share Info With                    Functional Status       Row Name 12/09/24 0913       Functional Status    Usual Activity Tolerance excellent    Current Activity Tolerance excellent       Physical Activity    On average, how many days per week do you engage in moderate to strenuous exercise (like a brisk walk)? 0 days    On average, how many minutes do you engage in exercise at this level? 0 min    Number of minutes of exercise per week 0       Assessment of Health Literacy    How often do you have someone help you read hospital materials? Never    How often do you have problems learning about your medical condition because of difficulty understanding written information? Never    How often do you have a problem understanding what is told to you about your medical condition? Never    How confident are you filling out medical forms by yourself? Extremely    Health Literacy Excellent       Functional Status, IADL    Medications independent    Meal Preparation independent    Housekeeping independent    Laundry independent     Shopping independent                   Psychosocial       Row Name 12/09/24 0913       Values/Beliefs    Spiritual, Cultural Beliefs, Evangelical Practices, Values that Affect Care no       Mental Health    Little interest or pleasure in doing things Not at all    Feeling down, depressed, or hopeless Not at all       Stress    Do you feel stress - tense, restless, nervous, or anxious, or unable to sleep at night because your mind is troubled all the time - these days? Not at all       Coping/Stress    Major Change/Loss/Stressor illness    Patient Personal Strengths able to adapt;resilient    Techniques to [place] with Loss/Stress/Change diversional activities    Reaction to Health Status accepting    Understanding of Condition and Treatment adequate understanding of medical condition;adequate understanding of treatment       Developmental Stage (Eriksson's Stages of Development)    Developmental Stage Stage 8 (65 years-death/Late Adulthood) Integrity vs. Despair                   Abuse/Neglect    No documentation.                  Legal    No documentation.                  Substance Abuse    No documentation.                  Patient Forms    No documentation.                     Santosh Hernandez RN

## 2024-12-09 NOTE — PAYOR COMM NOTE
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   Respiratory Panel PCR w/COVID-19(SARS-CoV-2) [facility] In-House, NP Swab in UTM/VTM, 2 HR TAT - Swab, Nasopharynx    Collection Time: 12/08/24  1:33 PM    Specimen: Nasopharynx; Swab   Result Value Ref Range    ADENOVIRUS, PCR Not Detected Not Detected    Coronavirus 229E Not Detected Not Detected    Coronavirus HKU1 Not Detected Not Detected    Coronavirus NL63 Not Detected Not Detected    Coronavirus OC43 Not Detected Not Detected    COVID19 Not Detected Not Detected - Ref. Range    Human Metapneumovirus Not Detected Not Detected    Human Rhinovirus/Enterovirus Not Detected Not Detected    Influenza A PCR Not Detected Not Detected    Influenza B PCR Not Detected Not Detected    Parainfluenza Virus 1 Not Detected Not Detected    Parainfluenza Virus 2 Not Detected Not Detected    Parainfluenza Virus 3 Not Detected Not Detected    Parainfluenza Virus 4 Not Detected Not Detected    RSV, PCR Not Detected Not Detected    Bordetella pertussis pcr Not Detected Not Detected    Bordetella parapertussis PCR Not Detected Not Detected     Chlamydophila pneumoniae PCR Not Detected Not Detected    Mycoplasma pneumo by PCR Not Detected Not Detected   CBC Auto Differential    Collection Time: 12/08/24  1:33 PM    Specimen: Blood   Result Value Ref Range    WBC 5.04 3.40 - 10.80 10*3/mm3    RBC 3.62 (L) 3.77 - 5.28 10*6/mm3    Hemoglobin 11.1 (L) 12.0 - 15.9 g/dL    Hematocrit 32.9 (L) 34.0 - 46.6 %    MCV 90.9 79.0 - 97.0 fL    MCH 30.7 26.6 - 33.0 pg    MCHC 33.7 31.5 - 35.7 g/dL    RDW 13.3 12.3 - 15.4 %    RDW-SD 44.8 37.0 - 54.0 fl    MPV 10.0 6.0 - 12.0 fL    Platelets 203 140 - 450 10*3/mm3    Neutrophil % 70.8 42.7 - 76.0 %    Lymphocyte % 16.3 (L) 19.6 - 45.3 %    Monocyte % 10.3 5.0 - 12.0 %    Eosinophil % 1.6 0.3 - 6.2 %    Basophil % 0.4 0.0 - 1.5 %    Immature Grans % 0.6 (H) 0.0 - 0.5 %    Neutrophils, Absolute 3.57 1.70 - 7.00 10*3/mm3    Lymphocytes, Absolute 0.82 0.70 - 3.10 10*3/mm3    Monocytes, Absolute 0.52 0.10 - 0.90 10*3/mm3    Eosinophils, Absolute 0.08 0.00 - 0.40 10*3/mm3    Basophils, Absolute 0.02 0.00 - 0.20 10*3/mm3    Immature Grans, Absolute 0.03 0.00 - 0.05 10*3/mm3    nRBC 0.0 0.0 - 0.2 /100 WBC   Acetaminophen Level    Collection Time: 12/08/24  1:33 PM    Specimen: Blood   Result Value Ref Range    Acetaminophen <5.0 0.0 - 30.0 mcg/mL   Urinalysis With Microscopic If Indicated (No Culture) - Urine, Clean Catch    Collection Time: 12/08/24  1:34 PM    Specimen: Urine, Clean Catch   Result Value Ref Range    Color, UA Yellow Yellow, Straw    Appearance, UA Cloudy (A) Clear    pH, UA <=5.0 5.0 - 8.0    Specific Gravity, UA 1.007 1.005 - 1.030    Glucose, UA Negative Negative    Ketones, UA Negative Negative    Bilirubin, UA Negative Negative    Blood, UA Small (1+) (A) Negative    Protein, UA 30 mg/dL (1+) (A) Negative    Leuk Esterase, UA Negative Negative    Nitrite, UA Negative Negative    Urobilinogen, UA 0.2 E.U./dL 0.2 - 1.0 E.U./dL   Urinalysis, Microscopic Only - Urine, Clean Catch    Collection Time: 12/08/24   1:34 PM    Specimen: Urine, Clean Catch   Result Value Ref Range    RBC, UA 3-5 (A) None Seen, 0-2 /HPF    WBC, UA 0-2 None Seen, 0-2 /HPF    Bacteria, UA Trace (A) None Seen /HPF    Squamous Epithelial Cells, UA 0-2 None Seen, 0-2 /HPF    Hyaline Casts, UA None Seen None Seen /LPF    Amorphous Crystals, UA Small/1+ None Seen /HPF    Starch, UA Small/1+ None Seen /HPF    Methodology Manual Light Microscopy    Mononucleosis Screen    Collection Time: 12/08/24  1:34 PM    Specimen: Blood   Result Value Ref Range    Monospot Negative Negative       If labs were ordered, I independently reviewed the results and considered them in treating the patient.        RADIOLOGY  CT Abdomen Pelvis Without Contrast    Result Date: 12/8/2024  PROCEDURE: CT ABDOMEN PELVIS WO CONTRAST-  HISTORY: abdominal pain, vomiting; N17.9-Acute kidney failure, unspecified  COMPARISON: August 31, 2023..  PROCEDURE: Axial images were obtained from the lung bases to the pubic symphysis by computed tomography. This study was performed with techniques to keep radiation doses as low as reasonably achievable, (ALARA). Individualized dose reduction techniques using automated exposure control or adjustment of mA and/or kV according to the patient size were employed.  FINDINGS:  ABDOMEN: The lung bases are clear. The heart is proper size. The limited non-contrast images of the liver are unremarkable. Gallbladder present with no CT visible stones. The spleen is unremarkable. No adrenal masses are seen. The aorta is normal in caliber. There is no significant free fluid or adenopathy.  There is no nephrolithiasis. There is no hydronephrosis. There is an exophytic hypodense cyst arising from the posterior aspect of the left kidney and a larger cyst arising from the inferior pole of the left kidney. Vascular calcifications noted. Gallbladder present with no CT visible stones.  PELVIS: The GI tract demonstrate no obstruction. The appendix is questionably  identified as a narrow tubular structure measuring 24 mm arising from the posterior aspect of the cecum. No secondary signs of appendicitis seen. The urinary bladder is partially collapsed with no abnormality seen. Uterus is diminutive or absent. There is diverticulosis without evidence of diverticulitis. There is no fluid or adenopathy.      No hydronephrosis or nephrolithiasis.  Diverticulosis.  Benign left renal cyst.   CTDI: 5.55 mGy DLP:247.9 mGy.cm  This report was signed and finalized on 12/8/2024 2:52 PM by Kim Romero MD.      XR Chest 2 View    Result Date: 12/8/2024  PROCEDURE: XR CHEST 2 VW-  HISTORY: cough; N17.9-Acute kidney failure, unspecified  COMPARISON: June 18, 2024..  FINDINGS: The heart is normal in size. The lungs are clear. The mediastinum is unremarkable. There is no pneumothorax.  There are no acute osseous abnormalities. Apical lordotic positioning noted.      No acute cardiopulmonary process.     This report was signed and finalized on 12/8/2024 2:39 PM by Kim Romero MD.           PROCEDURES    Critical Care    Performed by: Madi Ocampo MD  Authorized by: Madi Ocampo MD    Critical care provider statement:     Critical care time (minutes):  33    Critical care time was exclusive of:  Separately billable procedures and treating other patients    Critical care was necessary to treat or prevent imminent or life-threatening deterioration of the following conditions:  Renal failure and hepatic failure    Critical care was time spent personally by me on the following activities:  Ordering and performing treatments and interventions, ordering and review of laboratory studies, ordering and review of radiographic studies, pulse oximetry, re-evaluation of patient's condition, blood draw for specimens, development of treatment plan with patient or surrogate, discussions with consultants and examination of patient    I assumed direction of critical care for this patient  from another provider in my specialty: no      Care discussed with: admitting provider        Interpretations    O2 Sat: The patients oxygen saturation was 97% on Room Air.  This was independently interpreted by me as Normal    EKG: I reviewed and independently interpreted the EKG as sinus rhythm at 67 bpm, normal axis, normal intervals, no ST elevation, no T wave inversions    Cardiac Monitoring: I reviewed and independently interpreted the Rhythm Strip as Normal Sinus rhythm rate of 82    Radiology: I ordered and independently reviewed the above noted radiographic studies.  I viewed images of Chest Xray which showed No pulmonary process per my independent interpretation. See radiologist's dictation for official interpretation.     Radiology: I ordered and independently reviewed the above noted radiographic studies.  I viewed images of CT Abdomen/Pelvis which showed No intraabdominal process per my independent interpretation. See radiologist's dictation for official interpretation      MEDICATIONS GIVEN IN ER    Medications   ondansetron (ZOFRAN) injection 4 mg (4 mg Intravenous Not Given 12/8/24 1334)   piperacillin-tazobactam (ZOSYN) IVPB 3.375 g IVPB in 100 mL NS (VTB) (has no administration in time range)   sodium chloride 0.9 % bolus 1,000 mL (1,000 mL Intravenous New Bag 12/8/24 1333)   sodium chloride 0.9 % bolus 1,000 mL (0 mL Intravenous Stopped 12/8/24 1443)         MEDICAL DECISION MAKING, PROGRESS, and CONSULTS    All labs, if obtained, have been independently reviewed by me.  All radiology studies, if obtained, have been reviewed by me and the radiologist dictating the report.  All EKG's, if obtained, have been independently viewed and interpreted by me      Discussion below represents my analysis of pertinent findings related to patient's condition, differential diagnosis, treatment plan and final disposition.      Differential diagnosis:    74-year-old female presenting to the ED with complaints of  nausea, vomiting, shortness of breath, cough, chills.  She was placed on ciprofloxacin for a urinary tract infection, unable to see that urine result in epic, she is tender throughout her abdomen, she has been vomiting, certainly could be UTI, versus Pyelo, versus influenza, COVID,  or other viral syndrome. as well as possible pneumonia, obtain chest x-ray, CT scan of the abdomen and pelvis, laboratory workup including urinalysis, procalcitonin, lactic acid, respiratory panel    Additional Sources:  External (non-ED) record review:  Discharge summary 6/20/2024 after admission for acute kidney injury       Orders placed during this visit:  Orders Placed This Encounter   Procedures    Respiratory Panel PCR w/COVID-19(SARS-CoV-2) [facility] In-House, NP Swab in UTM/VTM, 2 HR TAT - Swab, Nasopharynx    Blood Culture - Blood,    Blood Culture - Blood,    XR Chest 2 View    CT Abdomen Pelvis Without Contrast    Comprehensive Metabolic Panel    Urinalysis With Microscopic If Indicated (No Culture) - Urine, Clean Catch    High Sensitivity Troponin T    BNP    Procalcitonin    Lactic Acid, Plasma    CBC Auto Differential    Urinalysis, Microscopic Only - Urine, Clean Catch    High Sensitivity Troponin T 2Hr    Mononucleosis Screen    Hepatitis Panel, Acute    Acetaminophen Level    CK    Insert Indwelling Urinary Catheter    Assess Need for Indwelling Urinary Catheter - Follow Removal Protocol    Urinary Catheter Care    ECG 12 Lead Dyspnea    Inpatient Admission    ED Bed Request    CBC & Differential         Additional orders considered but not ordered:  None    ED Course:    Consultants:  Hospitalist    ED Course as of 12/08/24 1510   Sun Dec 08, 2024   1333 Lactic Acid, Plasma  Lactic acid is unremarkable [CS]   1350 Urinalysis With Microscopic If Indicated (No Culture) - Urine, Clean Catch(!)  UA without obvious signs of infection [CS]   1351 CBC & Differential(!)  CBC with stable hemoglobin and hematocrit,  normal white blood cell count [CS]   1416 Comprehensive Metabolic Panel(!)  CMP with severe renal failure, much worse than baseline, will order Gross catheter, Noncon CT scan will proceed, additional IV fluids ordered [CS]   1420 High Sensitivity Troponin T(!)  Troponin elevated, likely secondary to acute renal failure [CS]   1422 Procalcitonin(!)  Procalcitonin significantly elevated, will give IV antibiotics [CS]   1425 BNP(!)  BNP is quite elevated, I wonder if this is due to the patient's renal injury however we will proceed with IV fluids as ordered [CS]   1437 Respiratory Panel PCR w/COVID-19(SARS-CoV-2) [facility] In-House, NP Swab in UTM/VTM, 2 HR TAT - Swab, Nasopharynx  Respiratory panel negative for acute viral infection [CS]   1455 Mononucleosis Screen  Mono was negative [CS]   1455 Given acute renal failure, patient will require admission to the hospital, will contact the hospitalist for admission [CS]   1508 Spoke directly to Dr. Johnson who agrees to evaluate the patient for admission [CS]      ED Course User Index  [CS] Madi Ocampo MD           After my consideration of clinical presentation and any laboratory/radiology studies obtained, I discussed the findings with the patient/patient representative who is in agreement with the treatment plan and the final disposition. Risks and benefits of admission was discussed     AS OF 15:10 EST VITALS:    BP - 148/71  HR - 73  TEMP - 97.6 °F (36.4 °C) (Oral)  O2 SATS - 97%    I reviewed the patients prescription monitoring report if available prior to discharge    DIAGNOSIS  Final diagnoses:   Acute renal failure, unspecified acute renal failure type   Transaminitis         DISPOSITION  ED Disposition       ED Disposition   Decision to Admit    Condition   --    Comment   Level of Care: Telemetry [5]   Diagnosis: GORGE (acute kidney injury) [128841]                     Please note that portions of this document were completed with voice  recognition software.        Madi Ocampo MD  12/08/24 1510       Madi Ocampo MD  12/08/24 1510      Electronically signed by Madi Ocampo MD at 12/08/24 1510       Vital Signs (last day)       Date/Time Temp Temp src Pulse Resp BP Patient Position SpO2    12/09/24 1119 97.8 (36.6) Oral 69 18 157/71 Lying 96    12/09/24 0725 97.7 (36.5) Oral 70 18 151/65 Lying 96    12/09/24 0337 97.9 (36.6) Oral 76 18 135/67 Lying 95    12/08/24 2337 98.2 (36.8) Oral 69 18 153/65 Lying 97    12/08/24 1833 98 (36.7) Oral 81 18 155/59 Lying 96    12/08/24 1536 -- -- 69 -- 150/68 -- 98    12/08/24 14:44:05 -- -- 73 -- -- -- 97    12/08/24 1358 -- -- 68 -- 148/71 -- 95    12/08/24 1301 97.6 (36.4) Oral 82 20 141/72 Sitting 97          Current Facility-Administered Medications   Medication Dose Route Frequency Provider Last Rate Last Admin    acetaminophen (TYLENOL) tablet 650 mg  650 mg Oral Q4H PRN Juan Lovelace MD        Or    acetaminophen (TYLENOL) 160 MG/5ML oral solution 650 mg  650 mg Oral Q4H PRN Juan Lovelace MD        Or    acetaminophen (TYLENOL) suppository 650 mg  650 mg Rectal Q4H PRN Juan Lovelace MD        aspirin chewable tablet 81 mg  81 mg Oral Daily Juan Lovelace MD   81 mg at 12/09/24 0908    sennosides-docusate (PERICOLACE) 8.6-50 MG per tablet 2 tablet  2 tablet Oral BID PRN Juan Lovelace MD        And    polyethylene glycol (MIRALAX) packet 17 g  17 g Oral Daily PRN Juan Lovelace MD        And    bisacodyl (DULCOLAX) EC tablet 5 mg  5 mg Oral Daily PRN Juan Lovelace MD        And    bisacodyl (DULCOLAX) suppository 10 mg  10 mg Rectal Daily PRN Juan Lovelace MD        cefTRIAXone (ROCEPHIN) 2,000 mg in sodium chloride 0.9 % 100 mL IVPB-VTB  2,000 mg Intravenous Q24H Juan Lovelace  mL/hr at 12/08/24 1711 2,000 mg at 12/08/24 1711    dextrose (D50W) (25 g/50 mL) IV injection 25 g  25 g Intravenous Q15 Min PRN Juan Lovelace MD        dextrose (GLUTOSE) oral gel 15 g  15  g Oral Q15 Min PRN Juan Lovelace MD        glucagon (GLUCAGEN) injection 1 mg  1 mg Intramuscular Q15 Min PRN Juan Lovelace MD        heparin (porcine) 5000 UNIT/ML injection 5,000 Units  5,000 Units Subcutaneous Q12H Juan Lovelace MD   5,000 Units at 12/09/24 0908    HYDROcodone-acetaminophen (NORCO) 7.5-325 MG per tablet 1 tablet  1 tablet Oral Q6H PRN Juan Lovelace MD   1 tablet at 12/09/24 0235    Insulin Lispro (humaLOG) injection 2-7 Units  2-7 Units Subcutaneous 4x Daily AC & at Bedtime Juan Lovelace MD   3 Units at 12/08/24 2125    lactobacillus acidophilus (RISAQUAD) capsule 1 capsule  1 capsule Oral BID Juan Lovelace MD   1 capsule at 12/09/24 0908    levothyroxine (SYNTHROID, LEVOTHROID) tablet 25 mcg  25 mcg Oral Daily Juan Lovelace MD   25 mcg at 12/09/24 0612    ondansetron (ZOFRAN) injection 4 mg  4 mg Intravenous Q6H PRN Juan Lovelace MD        pantoprazole (PROTONIX) EC tablet 40 mg  40 mg Oral Q AM Juan Lovelace MD   40 mg at 12/09/24 0612    promethazine (PHENERGAN) tablet 12.5 mg  12.5 mg Oral Q8H PRN Juan Lovelace MD        sodium bicarbonate tablet 1,300 mg  1,300 mg Oral BID Gurpreet Longo, APRHEDY   1,300 mg at 12/09/24 0931    sodium chloride 0.9 % flush 10 mL  10 mL Intravenous Q12H Juan Lovelace MD   10 mL at 12/09/24 0909    sodium chloride 0.9 % flush 10 mL  10 mL Intravenous PRN Juan Lovelace MD        sodium chloride 0.9 % infusion 40 mL  40 mL Intravenous PRN Juan Lovelace MD        sodium chloride 0.9 % infusion  100 mL/hr Intravenous Continuous Juan Lovelace  mL/hr at 12/08/24 1709 100 mL/hr at 12/08/24 1709    venlafaxine XR (EFFEXOR-XR) 24 hr capsule 37.5 mg  37.5 mg Oral Daily Juan Lovelace MD   37.5 mg at 12/09/24 0908     Lab Results (last 24 hours)       Procedure Component Value Units Date/Time    POC Glucose Once [[id number]]  (Abnormal) Collected: 12/09/24 1053    Specimen: Blood Updated: 12/09/24 1100     Glucose 164 mg/dL      Comment: Serial Number:  [id number]:  [id number]       POC Glucose Once [[id number]]  (Normal) Collected: 12/09/24 0657    Specimen: Blood Updated: 12/09/24 0706     Glucose 92 mg/dL      Comment: Serial Number: [id number]:  [id number]       Basic Metabolic Panel [[id number]]  (Abnormal) Collected: 12/09/24 0539    Specimen: Blood Updated: 12/09/24 0655     Glucose 95 mg/dL      BUN 48 mg/dL      Creatinine 6.49 mg/dL      Sodium 139 mmol/L      Potassium 4.3 mmol/L      Chloride 107 mmol/L      CO2 17.2 mmol/L      Calcium 8.1 mg/dL      BUN/Creatinine Ratio 7.4     Anion Gap 14.8 mmol/L      eGFR 6.2 mL/min/1.73      Comment: <15 Indicative of kidney failure       Narrative:      GFR Normal >60  Chronic Kidney Disease <60  Kidney Failure <15    The GFR formula is only valid for adults with stable renal function between ages 18 and 70.    CBC (No Diff) [[id number]]  (Abnormal) Collected: 12/09/24 0539    Specimen: Blood Updated: 12/09/24 0630     WBC 4.84 10*3/mm3      RBC 3.00 10*6/mm3      Hemoglobin 9.1 g/dL      Hematocrit 27.9 %      MCV 93.0 fL      MCH 30.3 pg      MCHC 32.6 g/dL      RDW 13.8 %      RDW-SD 47.2 fl      MPV 10.1 fL      Platelets 186 10*3/mm3     Hepatitis Panel, Acute [[id number]]  (Normal) Collected: 12/08/24 1500    Specimen: Blood Updated: 12/08/24 2046     Hepatitis B Surface Ag Non-Reactive     Hep A IgM Non-Reactive     Hep B C IgM Non-Reactive     Hepatitis C Ab Non-Reactive    Narrative:      Results may be falsely decreased if patient taking Biotin.     POC Glucose Once [[id number]]  (Abnormal) Collected: 12/08/24 2022    Specimen: Blood Updated: 12/08/24 2029     Glucose 202 mg/dL      Comment: Serial Number: [id number]:  [id number]       POC Glucose Once [[id number]]  (Normal) Collected: 12/08/24 1629    Specimen: Blood Updated: 12/08/24 1633     Glucose 99 mg/dL      Comment: Serial Number: [id number]:  [id number]       Hemoglobin A1c [[id number]]  (Abnormal) Collected: 12/08/24 133     Specimen: Blood Updated: 12/08/24 1616     Hemoglobin A1C 9.20 %     Narrative:      Hemoglobin A1C Ranges:    Increased Risk for Diabetes  5.7% to 6.4%  Diabetes                     >= 6.5%  Diabetic Goal                < 7.0%    High Sensitivity Troponin T 2Hr [[id number]]  (Abnormal) Collected: 12/08/24 1532    Specimen: Blood Updated: 12/08/24 1557     HS Troponin T 18 ng/L      Troponin T Delta -3 ng/L     Narrative:      High Sensitive Troponin T Reference Range:  <14.0 ng/L- Negative Female for AMI  <22.0 ng/L- Negative Male for AMI  >=14 - Abnormal Female indicating possible myocardial injury.  >=22 - Abnormal Male indicating possible myocardial injury.   Clinicians would have to utilize clinical acumen, EKG, Troponin, and serial changes to determine if it is an Acute Myocardial Infarction or myocardial injury due to an underlying chronic condition.         Blood Culture - Blood, Arm, Right [[id number]] Collected: 12/08/24 1515    Specimen: Blood from Arm, Right Updated: 12/08/24 1521    Blood Culture - Blood, Arm, Left [[id number]] Collected: 12/08/24 1500    Specimen: Blood from Arm, Left Updated: 12/08/24 1521    CK [[id number]]  (Normal) Collected: 12/08/24 1333    Specimen: Blood Updated: 12/08/24 1510     Creatine Kinase 113 U/L     Acetaminophen Level [[id number]]  (Normal) Collected: 12/08/24 1333    Specimen: Blood Updated: 12/08/24 1455     Acetaminophen <5.0 mcg/mL     Narrative:      Toxic = Greater than 150 mcg/mL    Mononucleosis Screen [[id number]]  (Normal) Collected: 12/08/24 1334    Specimen: Blood Updated: 12/08/24 1443     Monospot Negative    Respiratory Panel PCR w/COVID-19(SARS-CoV-2) [facility] In-House, NP Swab in UTM/VTM, 2 HR TAT - Swab, Nasopharynx [[id number]]  (Normal) Collected: 12/08/24 1333    Specimen: Swab from Nasopharynx Updated: 12/08/24 1433     ADENOVIRUS, PCR Not Detected     Coronavirus 229E Not Detected     Coronavirus HKU1 Not Detected     Coronavirus NL63  "Not Detected     Coronavirus OC43 Not Detected     COVID19 Not Detected     Human Metapneumovirus Not Detected     Human Rhinovirus/Enterovirus Not Detected     Influenza A PCR Not Detected     Influenza B PCR Not Detected     Parainfluenza Virus 1 Not Detected     Parainfluenza Virus 2 Not Detected     Parainfluenza Virus 3 Not Detected     Parainfluenza Virus 4 Not Detected     RSV, PCR Not Detected     Bordetella pertussis pcr Not Detected     Bordetella parapertussis PCR Not Detected     Chlamydophila pneumoniae PCR Not Detected     Mycoplasma pneumo by PCR Not Detected    Narrative:      In the setting of a positive respiratory panel with a viral infection PLUS a negative procalcitonin without other underlying concern for bacterial infection, consider observing off antibiotics or discontinuation of antibiotics and continue supportive care. If the respiratory panel is positive for atypical bacterial infection (Bordetella pertussis, Chlamydophila pneumoniae, or Mycoplasma pneumoniae), consider antibiotic de-escalation to target atypical bacterial infection.    Procalcitonin [[id number]]  (Abnormal) Collected: 12/08/24 1333    Specimen: Blood Updated: 12/08/24 1422     Procalcitonin 4.83 ng/mL     Narrative:      As a Marker for Sepsis (Non-Neonates):    1. <0.5 ng/mL represents a low risk of severe sepsis and/or septic shock.  2. >2 ng/mL represents a high risk of severe sepsis and/or septic shock.    As a Marker for Lower Respiratory Tract Infections that require antibiotic therapy:    PCT on Admission    Antibiotic Therapy       6-12 Hrs later    >0.5                Strongly Recommended  >0.25 - <0.5        Recommended   0.1 - 0.25          Discouraged              Remeasure/reassess PCT  <0.1                Strongly Discouraged     Remeasure/reassess PCT    As 28 day mortality risk marker: \"Change in Procalcitonin Result\" (>80% or <=80%) if Day 0 (or Day 1) and Day 4 values are available. Refer to " http://www.[email or web address]-pct-calculator.com    Change in PCT <=80%  A decrease of PCT levels below or equal to 80% defines a positive change in PCT test result representing a higher risk for 28-day all-cause mortality of patients diagnosed with severe sepsis for septic shock.    Change in PCT >80%  A decrease of PCT levels of more than 80% defines a negative change in PCT result representing a lower risk for 28-day all-cause mortality of patients diagnosed with severe sepsis or septic shock.       BNP [[id number]]  (Abnormal) Collected: 12/08/24 1333    Specimen: Blood Updated: 12/08/24 1421     proBNP 4,009.0 pg/mL     Narrative:      This assay is used as an aid in the diagnosis of individuals suspected of having heart failure. It can be used as an aid in the diagnosis of acute decompensated heart failure (ADHF) in patients presenting with signs and symptoms of ADHF to the emergency department (ED). In addition, NT-proBNP of <300 pg/mL indicates ADHF is not likely.    Age Range Result Interpretation  NT-proBNP Concentration (pg/mL:      <50             Positive            >450                   Gray                 300-450                    Negative             <300    50-75           Positive            >900                  Gray                300-900                  Negative            <300      >75             Positive            >1800                  Gray                300-1800                  Negative            <300    High Sensitivity Troponin T [[id number]]  (Abnormal) Collected: 12/08/24 1333    Specimen: Blood Updated: 12/08/24 1418     HS Troponin T 21 ng/L     Narrative:      High Sensitive Troponin T Reference Range:  <14.0 ng/L- Negative Female for AMI  <22.0 ng/L- Negative Male for AMI  >=14 - Abnormal Female indicating possible myocardial injury.  >=22 - Abnormal Male indicating possible myocardial injury.   Clinicians would have to utilize clinical acumen, EKG, Troponin, and serial changes to  determine if it is an Acute Myocardial Infarction or myocardial injury due to an underlying chronic condition.         Comprehensive Metabolic Panel [[id number]]  (Abnormal) Collected: 12/08/24 1333    Specimen: Blood Updated: 12/08/24 1407     Glucose 168 mg/dL      BUN 52 mg/dL      Creatinine 6.84 mg/dL      Sodium 132 mmol/L      Potassium 4.4 mmol/L      Chloride 94 mmol/L      CO2 19.0 mmol/L      Calcium 9.1 mg/dL      Total Protein 7.0 g/dL      Albumin 3.9 g/dL      ALT (SGPT) 353 U/L      AST (SGOT) 113 U/L      Alkaline Phosphatase 147 U/L      Total Bilirubin 0.5 mg/dL      Globulin 3.1 gm/dL      A/G Ratio 1.3 g/dL      BUN/Creatinine Ratio 7.6     Anion Gap 19.0 mmol/L      eGFR 5.8 mL/min/1.73      Comment: <15 Indicative of kidney failure       Narrative:      GFR Normal >60  Chronic Kidney Disease <60  Kidney Failure <15    The GFR formula is only valid for adults with stable renal function between ages 18 and 70.    Lactic Acid, Plasma [[id number]]  (Normal) Collected: 12/08/24 1333    Specimen: Blood Updated: 12/08/24 1402     Lactate 1.6 mmol/L     Urinalysis, Microscopic Only - Urine, Clean Catch [[id number]]  (Abnormal) Collected: 12/08/24 1334    Specimen: Urine, Clean Catch Updated: 12/08/24 1400     RBC, UA 3-5 /HPF      WBC, UA 0-2 /HPF      Bacteria, UA Trace /HPF      Squamous Epithelial Cells, UA 0-2 /HPF      Hyaline Casts, UA None Seen /LPF      Amorphous Crystals, UA Small/1+ /HPF      Starch, UA Small/1+ /HPF      Methodology Manual Light Microscopy    Urinalysis With Microscopic If Indicated (No Culture) - Urine, Clean Catch [[id number]]  (Abnormal) Collected: 12/08/24 1334    Specimen: Urine, Clean Catch Updated: 12/08/24 1348     Color, UA Yellow     Appearance, UA Cloudy     pH, UA <=5.0     Specific Gravity, UA 1.007     Glucose, UA Negative     Ketones, UA Negative     Bilirubin, UA Negative     Blood, UA Small (1+)     Protein, UA 30 mg/dL (1+)     Leuk Esterase, UA Negative      Nitrite, UA Negative     Urobilinogen, UA 0.2 E.U./dL    CBC & Differential [[id number]]  (Abnormal) Collected: 12/08/24 1333    Specimen: Blood Updated: 12/08/24 1344    Narrative:      The following orders were created for panel order CBC & Differential.  Procedure                               Abnormality         Status                     ---------                               -----------         ------                     CBC Auto Differential[[id number]]        Abnormal            Final result                 Please view results for these tests on the individual orders.    CBC Auto Differential [[id number]]  (Abnormal) Collected: 12/08/24 1333    Specimen: Blood Updated: 12/08/24 1344     WBC 5.04 10*3/mm3      RBC 3.62 10*6/mm3      Hemoglobin 11.1 g/dL      Hematocrit 32.9 %      MCV 90.9 fL      MCH 30.7 pg      MCHC 33.7 g/dL      RDW 13.3 %      RDW-SD 44.8 fl      MPV 10.0 fL      Platelets 203 10*3/mm3      Neutrophil % 70.8 %      Lymphocyte % 16.3 %      Monocyte % 10.3 %      Eosinophil % 1.6 %      Basophil % 0.4 %      Immature Grans % 0.6 %      Neutrophils, Absolute 3.57 10*3/mm3      Lymphocytes, Absolute 0.82 10*3/mm3      Monocytes, Absolute 0.52 10*3/mm3      Eosinophils, Absolute 0.08 10*3/mm3      Basophils, Absolute 0.02 10*3/mm3      Immature Grans, Absolute 0.03 10*3/mm3      nRBC 0.0 /100 WBC           Imaging Results (Last 24 Hours)       Procedure Component Value Units Date/Time    CT Abdomen Pelvis Without Contrast [[id number]] Collected: 12/08/24 1447     Updated: 12/08/24 1454    Narrative:      PROCEDURE: CT ABDOMEN PELVIS WO CONTRAST-     HISTORY: abdominal pain, vomiting; N17.9-Acute kidney failure,  unspecified     COMPARISON: August 31, 2023..     PROCEDURE: Axial images were obtained from the lung bases to the pubic  symphysis by computed tomography. This study was performed with  techniques to keep radiation doses as low as reasonably achievable,  (ALARA). Individualized dose  reduction techniques using automated  exposure control or adjustment of mA and/or kV according to the patient  size were employed.     FINDINGS:     ABDOMEN: The lung bases are clear. The heart is proper size. The limited  non-contrast images of the liver are unremarkable. Gallbladder present  with no CT visible stones. The spleen is unremarkable. No adrenal masses  are seen. The aorta is normal in caliber. There is no significant free  fluid or adenopathy.  There is no nephrolithiasis. There is no  hydronephrosis. There is an exophytic hypodense cyst arising from the  posterior aspect of the left kidney and a larger cyst arising from the  inferior pole of the left kidney. Vascular calcifications noted.  Gallbladder present with no CT visible stones.     PELVIS: The GI tract demonstrate no obstruction. The appendix is  questionably identified as a narrow tubular structure measuring 24 mm  arising from the posterior aspect of the cecum. No secondary signs of  appendicitis seen. The urinary bladder is partially collapsed with no  abnormality seen. Uterus is diminutive or absent. There is  diverticulosis without evidence of diverticulitis. There is no fluid or  adenopathy.        Impression:      No hydronephrosis or nephrolithiasis.     Diverticulosis.     Benign left renal cyst.        CTDI: 5.55 mGy  DLP:247.9 mGy.cm     This report was signed and finalized on 12/8/2024 2:52 PM by Kim Romero MD.       XR Chest 2 View [[id number]] Collected: 12/08/24 1439     Updated: 12/08/24 1441    Narrative:      PROCEDURE: XR CHEST 2 VW-     HISTORY: cough; N17.9-Acute kidney failure, unspecified     COMPARISON: June 18, 2024..     FINDINGS: The heart is normal in size. The lungs are clear. The  mediastinum is unremarkable. There is no pneumothorax.  There are no  acute osseous abnormalities. Apical lordotic positioning noted.        Impression:      No acute cardiopulmonary process.              This report was signed and  finalized on 12/8/2024 2:39 PM by Kim Romero MD.             Physician Progress Notes (last 24 hours)  Notes from 12/08/24 1137 through 12/09/24 1137   No notes of this type exist for this encounter.       Consult Notes (last 24 hours)  Notes from 12/08/24 1137 through 12/09/24 1137   No notes of this type exist for this encounter.
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
## 2024-12-10 LAB
ALBUMIN SERPL-MCNC: 3.1 G/DL (ref 3.5–5.2)
ALBUMIN/GLOB SERPL: 1.3 G/DL
ALP SERPL-CCNC: 105 U/L (ref 39–117)
ALT SERPL W P-5'-P-CCNC: 157 U/L (ref 1–33)
ANION GAP SERPL CALCULATED.3IONS-SCNC: 14.3 MMOL/L (ref 5–15)
AST SERPL-CCNC: 36 U/L (ref 1–32)
BILIRUB SERPL-MCNC: 0.2 MG/DL (ref 0–1.2)
BUN SERPL-MCNC: 47 MG/DL (ref 8–23)
BUN/CREAT SERPL: 7.3 (ref 7–25)
CALCIUM SPEC-SCNC: 8.4 MG/DL (ref 8.6–10.5)
CHLORIDE SERPL-SCNC: 109 MMOL/L (ref 98–107)
CO2 SERPL-SCNC: 18.7 MMOL/L (ref 22–29)
CREAT SERPL-MCNC: 6.47 MG/DL (ref 0.57–1)
DEPRECATED RDW RBC AUTO: 47.6 FL (ref 37–54)
EGFRCR SERPLBLD CKD-EPI 2021: 6.2 ML/MIN/1.73
ERYTHROCYTE [DISTWIDTH] IN BLOOD BY AUTOMATED COUNT: 13.8 % (ref 12.3–15.4)
GLOBULIN UR ELPH-MCNC: 2.4 GM/DL
GLUCOSE BLDC GLUCOMTR-MCNC: 106 MG/DL (ref 70–130)
GLUCOSE BLDC GLUCOMTR-MCNC: 161 MG/DL (ref 70–130)
GLUCOSE BLDC GLUCOMTR-MCNC: 181 MG/DL (ref 70–130)
GLUCOSE BLDC GLUCOMTR-MCNC: 93 MG/DL (ref 70–130)
GLUCOSE SERPL-MCNC: 107 MG/DL (ref 65–99)
HCT VFR BLD AUTO: 29.9 % (ref 34–46.6)
HGB BLD-MCNC: 9.6 G/DL (ref 12–15.9)
MCH RBC QN AUTO: 30.3 PG (ref 26.6–33)
MCHC RBC AUTO-ENTMCNC: 32.1 G/DL (ref 31.5–35.7)
MCV RBC AUTO: 94.3 FL (ref 79–97)
PLATELET # BLD AUTO: 202 10*3/MM3 (ref 140–450)
PMV BLD AUTO: 10.1 FL (ref 6–12)
POTASSIUM SERPL-SCNC: 4.5 MMOL/L (ref 3.5–5.2)
PROT SERPL-MCNC: 5.5 G/DL (ref 6–8.5)
RBC # BLD AUTO: 3.17 10*6/MM3 (ref 3.77–5.28)
SODIUM SERPL-SCNC: 142 MMOL/L (ref 136–145)
WBC NRBC COR # BLD AUTO: 6.27 10*3/MM3 (ref 3.4–10.8)

## 2024-12-10 PROCEDURE — 25810000003 SODIUM CHLORIDE 0.9 % SOLUTION: Performed by: INTERNAL MEDICINE

## 2024-12-10 PROCEDURE — 63710000001 INSULIN LISPRO (HUMAN) PER 5 UNITS: Performed by: INTERNAL MEDICINE

## 2024-12-10 PROCEDURE — 99232 SBSQ HOSP IP/OBS MODERATE 35: CPT | Performed by: STUDENT IN AN ORGANIZED HEALTH CARE EDUCATION/TRAINING PROGRAM

## 2024-12-10 PROCEDURE — 82948 REAGENT STRIP/BLOOD GLUCOSE: CPT

## 2024-12-10 PROCEDURE — 25010000002 CEFTRIAXONE PER 250 MG: Performed by: INTERNAL MEDICINE

## 2024-12-10 PROCEDURE — 25010000002 HEPARIN (PORCINE) PER 1000 UNITS: Performed by: INTERNAL MEDICINE

## 2024-12-10 PROCEDURE — 85027 COMPLETE CBC AUTOMATED: CPT | Performed by: INTERNAL MEDICINE

## 2024-12-10 PROCEDURE — 80053 COMPREHEN METABOLIC PANEL: CPT | Performed by: INTERNAL MEDICINE

## 2024-12-10 PROCEDURE — 82948 REAGENT STRIP/BLOOD GLUCOSE: CPT | Performed by: INTERNAL MEDICINE

## 2024-12-10 RX ORDER — SODIUM CHLORIDE 9 MG/ML
100 INJECTION, SOLUTION INTRAVENOUS CONTINUOUS
Status: DISCONTINUED | OUTPATIENT
Start: 2024-12-10 | End: 2024-12-11

## 2024-12-10 RX ORDER — SODIUM BICARBONATE 650 MG/1
1300 TABLET ORAL 4 TIMES DAILY
Status: DISCONTINUED | OUTPATIENT
Start: 2024-12-10 | End: 2024-12-13

## 2024-12-10 RX ADMIN — Medication 1 CAPSULE: at 08:41

## 2024-12-10 RX ADMIN — ACETAMINOPHEN 650 MG: 325 TABLET, FILM COATED ORAL at 20:36

## 2024-12-10 RX ADMIN — HEPARIN SODIUM 5000 UNITS: 5000 INJECTION INTRAVENOUS; SUBCUTANEOUS at 20:37

## 2024-12-10 RX ADMIN — SODIUM BICARBONATE 650 MG TABLET 1300 MG: at 20:36

## 2024-12-10 RX ADMIN — SODIUM CHLORIDE 100 ML/HR: 9 INJECTION, SOLUTION INTRAVENOUS at 11:25

## 2024-12-10 RX ADMIN — SENNOSIDES AND DOCUSATE SODIUM 2 TABLET: 50; 8.6 TABLET ORAL at 09:35

## 2024-12-10 RX ADMIN — SODIUM CHLORIDE 2000 MG: 9 INJECTION, SOLUTION INTRAVENOUS at 17:42

## 2024-12-10 RX ADMIN — ASPIRIN 81 MG CHEWABLE TABLET 81 MG: 81 TABLET CHEWABLE at 08:41

## 2024-12-10 RX ADMIN — SODIUM BICARBONATE 650 MG TABLET 1300 MG: at 08:41

## 2024-12-10 RX ADMIN — SODIUM CHLORIDE 100 ML/HR: 9 INJECTION, SOLUTION INTRAVENOUS at 00:57

## 2024-12-10 RX ADMIN — SODIUM BICARBONATE 650 MG TABLET 1300 MG: at 17:43

## 2024-12-10 RX ADMIN — Medication 1 CAPSULE: at 20:36

## 2024-12-10 RX ADMIN — HYDROCODONE BITARTRATE AND ACETAMINOPHEN 1 TABLET: 7.5; 325 TABLET ORAL at 17:42

## 2024-12-10 RX ADMIN — PANTOPRAZOLE SODIUM 40 MG: 40 TABLET, DELAYED RELEASE ORAL at 06:07

## 2024-12-10 RX ADMIN — HEPARIN SODIUM 5000 UNITS: 5000 INJECTION INTRAVENOUS; SUBCUTANEOUS at 08:41

## 2024-12-10 RX ADMIN — INSULIN LISPRO 2 UNITS: 100 INJECTION, SOLUTION INTRAVENOUS; SUBCUTANEOUS at 11:48

## 2024-12-10 RX ADMIN — Medication 10 ML: at 08:40

## 2024-12-10 RX ADMIN — Medication 10 ML: at 20:37

## 2024-12-10 RX ADMIN — SODIUM CHLORIDE 100 ML/HR: 9 INJECTION, SOLUTION INTRAVENOUS at 08:44

## 2024-12-10 RX ADMIN — INSULIN LISPRO 2 UNITS: 100 INJECTION, SOLUTION INTRAVENOUS; SUBCUTANEOUS at 20:37

## 2024-12-10 RX ADMIN — VENLAFAXINE HYDROCHLORIDE 37.5 MG: 37.5 CAPSULE, EXTENDED RELEASE ORAL at 08:41

## 2024-12-10 RX ADMIN — LEVOTHYROXINE SODIUM 25 MCG: 0.03 TABLET ORAL at 06:07

## 2024-12-10 NOTE — PROGRESS NOTES
[facility]    [facility]    Name:  Latonya Mei   Age:  74 y.o.  Sex:  female  :  1950  MRN:  [id number]   Visit Number:  [id number]  Admission Date:  2024  Date Of Service:  12/10/24  Primary Care Physician:  Chrystal Buckley APRN     LOS: 2 days :    Chief Complaint:      Follow-up; renal failure    Subjective:    Feels good today.  Still having cough but no chest pain.    Hospital Course:    Latonya Mei is a 74-year-old female with history of type 2 diabetes, hypothyroidism, GERD, fibromyalgia was brought to the emergency room by her daughter with multiple symptoms including chills, abdominal discomfort, nausea, vomiting, cough and shortness of breath.  Patient states that she has had chronic left knee joint pain due to torn meniscus.  She has been following up with Dr. Vazquez and apparently has been scheduled for surgery.  She has been taking 2 tablets of Motrin/Tylenol as needed for the last 3 weeks.  4 days ago, she started having epigastric pain nausea, vomiting, chills and low-grade fevers.  She called her primary care provider and apparently was seen by her and urine was sent.  She was placed on ciprofloxacin.  Patient however has not had any relief of symptoms and came to the emergency room.     Patient was last admitted to this facility in 2024 with similar complaints and at that time she was noted to have mild GORGE.  Due to shortness of breath, she was seen by cardiology and did undergo cardiac catheterization which was negative for any significant coronary artery disease at that time.     In the emergency room, she was afebrile and hemodynamically stable saturating at 97% on room air.  Initial CK was within normal range.  Troponin 21, proBNP 4000, sodium 132, CO2 19, anion gap 19, BUN 52, creatinine 6.84 (baseline 1.2), glucose 168, alkaline phosphatase 147, , .  Lactic acid was within normal range but procalcitonin is elevated at  4.83.  CBC was unremarkable except for a hemoglobin of 11 (baseline).  Monospot test was negative.  Urine analysis shows 3-5 RBCs but otherwise negative for any evidence of urinary tract infection.  Respiratory panel was negative.  Chest x-ray showed no acute process.  CT of the abdomen and pelvis without contrast was negative for any hydronephrosis or nephrolithiasis.  It showed diverticulosis, benign left renal cyst.  EKG was unremarkable.  Patient was given 2 L of normal saline bolus, Zofran and Zosyn in the emergency room.  Blood cultures were done and acute hepatitis panel was sent.         Review of Systems:     All systems were reviewed and negative except as mentioned in subjective, assessment and plan.    Vital Signs:    Temp:  [97.3 °F (36.3 °C)-98.8 °F (37.1 °C)] 98.2 °F (36.8 °C)  Heart Rate:  [61-77] 77  Resp:  [18] 18  BP: (116-179)/(66-76) 169/73    Intake and output:    I/O last 3 completed shifts:  In: 800 [P.O.:800]  Out: 4400 [Urine:4400]  I/O this shift:  In: 720 [P.O.:720]  Out: 2000 [Urine:2000]    Physical Examination:    General Appearance:  Alert and cooperative.  Comfortable in bed.   Head:  Atraumatic and normocephalic.   Eyes: Conjunctivae and sclerae normal, no icterus. No pallor.   Throat: No oral lesions, no thrush, oral mucosa moist.   Neck: Supple, trachea midline, no thyromegaly.   Lungs:   Breath sounds heard bilaterally equally.  No wheezing or crackles. No Pleural rub or bronchial breathing.   Heart:  Normal S1 and S2, no murmur, no gallop, no rub. No JVD.   Abdomen:   Normal bowel sounds, no masses, no organomegaly. Soft, mild nonspecific tenderness, nondistended, no rebound tenderness.  Obese.   Extremities: Supple, no edema, no cyanosis, no clubbing.   Skin: Warm.   Neurologic: Alert and oriented x 3. No facial asymmetry. Moves all four limbs. No tremors.      Laboratory results:    Results from last 7 days   Lab Units 12/10/24  0633 12/09/24  0539 12/08/24  1333   SODIUM  "mmol/L 142 139 132*   POTASSIUM mmol/L 4.5 4.3 4.4   CHLORIDE mmol/L 109* 107 94*   CO2 mmol/L 18.7* 17.2* 19.0*   BUN mg/dL 47* 48* 52*   CREATININE mg/dL 6.47* 6.49* 6.84*   CALCIUM mg/dL 8.4* 8.1* 9.1   BILIRUBIN mg/dL 0.2  --  0.5   ALK PHOS U/L 105  --  147*   ALT (SGPT) U/L 157*  --  353*   AST (SGOT) U/L 36*  --  113*   GLUCOSE mg/dL 107* 95 168*     Results from last 7 days   Lab Units 12/10/24  0633 12/09/24  0539 12/08/24  1333   WBC 10*3/mm3 6.27 4.84 5.04   HEMOGLOBIN g/dL 9.6* 9.1* 11.1*   HEMATOCRIT % 29.9* 27.9* 32.9*   PLATELETS 10*3/mm3 202 186 203         Results from last 7 days   Lab Units 12/08/24  1532 12/08/24  1333   CK TOTAL U/L  --  113   HSTROP T ng/L 18* 21*     Results from last 7 days   Lab Units 12/08/24  1515 12/08/24  1500   BLOODCX  No growth at 2 days No growth at 2 days     No results for input(s): \"PHART\", \"CXI5EWG\", \"PO2ART\", \"QAF5WLL\", \"BASEEXCESS\" in the last 8760 hours.   I have reviewed the patient's laboratory results.    Radiology results:    No radiology results from the last 24 hrs  I have reviewed the patient's radiology reports.    Medication Review:     I have reviewed the patient's active and prn medications.     Problem List:      GORGE (acute kidney injury)    Type 2 diabetes mellitus without complication, without long-term current use of insulin    Gastroesophageal reflux disease      Assessment/Plan:    Inpatient general floor admission 12/8/2024 with acute renal failure.  Due to elevated procalcitonin and cough, she was started on Rocephin empirically.    GORGE  Nephrology consultation, recommendations appreciated.  IVF.  Gross catheter.  Likely secondary to NSAIDs and dehydration.  Strongly advised to discontinue NSAIDs.  CT of the abdomen negative for any hydronephrosis.  Gastroenteritis  Continue IVF.  Protonix.  Zofran.  Slowly improving.  Cough  Elevated procalcitonin  Rocephin empirically.  Blood cultures negative.  Transaminitis  Monitoring.  Possibly " secondary to dehydration.  Viral hepatitis panel negative.  No specific right upper quadrant tenderness.    Chronic: type 2 diabetes, hypothyroidism, GERD, fibromyalgia  Subcutaneous insulin protocol.  Continue other home medications.    DVT Prophylaxis: Heparin  Code Status: Full code  Diet: Diabetic  Discharge Plan: At least several more days depending on clinical status    Brian Joseph Kerley, DO  12/10/24  16:46 EST    Dictated utilizing Dragon dictation.    I have reviewed the copied text and it is accurate as of 12/10/2024

## 2024-12-10 NOTE — PROGRESS NOTES
[facility] Progress Note  [facility]. [place]        Patient Name: Latonya Mei  : 1950  MRN: [id number]   LOS: 2 days    Patient Care Team:  Chrystal Buckley APRN as PCP - General (Nurse Practitioner)  Pao John MD as Consulting Physician (General Surgery)  Glen Ríos MD as Consulting Physician (Cardiology)  Nia García APRN as Nurse Practitioner (Gastroenterology)    Chief Complaint:    Chief Complaint   Patient presents with    Pain With Breathing    Cough     Primary Care Physician:  Chrystal Buckley APRN  Date of admission: 2024    Subjective     Interval History:   Follow-up acute kidney injury.  When I went in to see the patient, she was in the bathroom walked by herself and back to the bed without any problem.  She is awake, oriented. Denies active chest pain.   Events noted from last 24 hours.  I reviewed the chart and other providers notes, labs and procedures done since my last note.    Review of Systems:   As noted above.    Objective     Vitals:   Temp:  [97.3 °F (36.3 °C)-98.5 °F (36.9 °C)] 98.1 °F (36.7 °C)  Heart Rate:  [61-77] 77  Resp:  [18] 18  BP: (116-173)/(66-77) 173/77    Intake/Output Summary (Last 24 hours) at 12/10/2024 2115  Last data filed at 12/10/2024 1616  Gross per 24 hour   Intake 720 ml   Output 3050 ml   Net -2330 ml       Physical Exam:    Constitutional: Awake, alert  Eyes: sclerae anicteric, no conjunctival injection  HEENT: mucous membranes dry  Neck: Supple, no thyromegaly, no lymphadenopathy, trachea midline, No JVD  Respiratory: Clear to auscultation bilaterally, nonlabored respirations   Cardiovascular: RRR, no murmurs, rubs, or gallops.  Gastrointestinal: Positive bowel sounds, obese, soft, + tender around epigastric area on palpation  Musculoskeletal: No edema, no clubbing or cyanosis  Psychiatric: Appropriate affect, cooperative  Neurologic: Oriented x 3, moving all extremities, Cranial  Nerves grossly intact, speech clear  Skin: warm and dry, no rashes     Scheduled Meds:     Current Facility-Administered Medications   Medication Dose Route Frequency Provider Last Rate Last Admin    acetaminophen (TYLENOL) tablet 650 mg  650 mg Oral Q4H PRN Juan Lovelace MD   650 mg at 12/10/24 2036    Or    acetaminophen (TYLENOL) 160 MG/5ML oral solution 650 mg  650 mg Oral Q4H PRN Juan Lovelace MD        Or    acetaminophen (TYLENOL) suppository 650 mg  650 mg Rectal Q4H PRN Juan Lovelace MD        aspirin chewable tablet 81 mg  81 mg Oral Daily Juan Lovelace MD   81 mg at 12/10/24 0841    sennosides-docusate (PERICOLACE) 8.6-50 MG per tablet 2 tablet  2 tablet Oral BID PRN Juan Lovelace MD   2 tablet at 12/10/24 0935    And    polyethylene glycol (MIRALAX) packet 17 g  17 g Oral Daily PRN Juan Lovelace MD        And    bisacodyl (DULCOLAX) EC tablet 5 mg  5 mg Oral Daily PRN Juan Lovelace MD        And    bisacodyl (DULCOLAX) suppository 10 mg  10 mg Rectal Daily PRN Juan Lovelace MD        cefTRIAXone (ROCEPHIN) 2,000 mg in sodium chloride 0.9 % 100 mL IVPB-VTB  2,000 mg Intravenous Q24H Juan Lovelace  mL/hr at 12/10/24 1742 2,000 mg at 12/10/24 1742    dextrose (D50W) (25 g/50 mL) IV injection 25 g  25 g Intravenous Q15 Min PRN Juan Lovelace MD        dextrose (GLUTOSE) oral gel 15 g  15 g Oral Q15 Min PRN Juan Lovelace MD        glucagon (GLUCAGEN) injection 1 mg  1 mg Intramuscular Q15 Min PRN Juan Lovelace MD        heparin (porcine) 5000 UNIT/ML injection 5,000 Units  5,000 Units Subcutaneous Q12H Juan Lovelace MD   5,000 Units at 12/10/24 2037    HYDROcodone-acetaminophen (NORCO) 7.5-325 MG per tablet 1 tablet  1 tablet Oral Q6H PRN Juan Lovelace MD   1 tablet at 12/10/24 1742    Insulin Lispro (humaLOG) injection 2-7 Units  2-7 Units Subcutaneous 4x Daily AC & at Bedtime Juan Lovelace MD   2 Units at 12/10/24 2037    lactobacillus acidophilus (RISAQUAD) capsule 1 capsule  1 capsule Oral BID [place]  MD Juan   1 capsule at 12/10/24 2036    levothyroxine (SYNTHROID, LEVOTHROID) tablet 25 mcg  25 mcg Oral Daily Juan Lovelace MD   25 mcg at 12/10/24 0607    ondansetron (ZOFRAN) injection 4 mg  4 mg Intravenous Q6H PRN Juan Lovelace MD        pantoprazole (PROTONIX) EC tablet 40 mg  40 mg Oral Q AM Juan Lovelace MD   40 mg at 12/10/24 0607    promethazine (PHENERGAN) tablet 12.5 mg  12.5 mg Oral Q8H PRN Juan Lovelace MD        sodium bicarbonate tablet 1,300 mg  1,300 mg Oral 4x Daily Jamey Car MD, FASN   1,300 mg at 12/10/24 2036    sodium chloride 0.9 % flush 10 mL  10 mL Intravenous Q12H Juan Lovelace MD   10 mL at 12/10/24 2037    sodium chloride 0.9 % flush 10 mL  10 mL Intravenous PRN Juan Lovelace MD        sodium chloride 0.9 % infusion 40 mL  40 mL Intravenous PRN Juan Lovelace MD        sodium chloride 0.9 % infusion  100 mL/hr Intravenous Continuous Jamey Car MD, FASN 100 mL/hr at 12/10/24 2018 100 mL/hr at 12/10/24 2018    venlafaxine XR (EFFEXOR-XR) 24 hr capsule 37.5 mg  37.5 mg Oral Daily Juan Lovelace MD   37.5 mg at 12/10/24 0841       aspirin, 81 mg, Oral, Daily  cefTRIAXone, 2,000 mg, Intravenous, Q24H  heparin (porcine), 5,000 Units, Subcutaneous, Q12H  insulin lispro, 2-7 Units, Subcutaneous, 4x Daily AC & at Bedtime  lactobacillus acidophilus, 1 capsule, Oral, BID  levothyroxine, 25 mcg, Oral, Daily  pantoprazole, 40 mg, Oral, Q AM  sodium bicarbonate, 1,300 mg, Oral, 4x Daily  sodium chloride, 10 mL, Intravenous, Q12H  venlafaxine XR, 37.5 mg, Oral, Daily        IV Meds:   sodium chloride, 100 mL/hr, Last Rate: 100 mL/hr (12/10/24 2018)        Results Reviewed:   I have personally reviewed the results from the time of this admission to 12/10/2024 21:15 EST     Results from last 7 days   Lab Units 12/10/24  0633 12/09/24  0539 12/08/24  1333   SODIUM mmol/L 142 139 132*   POTASSIUM mmol/L 4.5 4.3 4.4   CHLORIDE mmol/L 109* 107 94*   CO2 mmol/L 18.7* 17.2* 19.0*   BUN mg/dL 47*  "48* 52*   CREATININE mg/dL 6.47* 6.49* 6.84*   CALCIUM mg/dL 8.4* 8.1* 9.1   BILIRUBIN mg/dL 0.2  --  0.5   ALK PHOS U/L 105  --  147*   ALT (SGPT) U/L 157*  --  353*   AST (SGOT) U/L 36*  --  113*   GLUCOSE mg/dL 107* 95 168*       Estimated Creatinine Clearance: 7.8 mL/min (A) (by C-G formula based on SCr of 6.47 mg/dL (H)).                Results from last 7 days   Lab Units 12/10/24  0633 12/09/24  0539 12/08/24  1333   WBC 10*3/mm3 6.27 4.84 5.04   HEMOGLOBIN g/dL 9.6* 9.1* 11.1*   PLATELETS 10*3/mm3 202 186 203             Brief Urine Lab Results  (Last result in the past 365 days)        Color   Clarity   Blood   Leuk Est   Nitrite   Protein   CREAT   Urine HCG        12/08/24 1334 Yellow   Cloudy   Small (1+)   Negative   Negative   30 mg/dL (1+)                   No results found for: \"UTPCR\"    Imaging Results (Last 24 Hours)       ** No results found for the last 24 hours. **                Assessment / Plan     ASSESSMENT:  GORGE: Presented with sCr 6.84 on baseline ~1.2.  On initial presentation she was noted to be slightly hyponatremic and does have significantly low bicarb but electrolyte appears to be stable.  Likely pre-renal due to volume loss, hypovolemia as patient has been vomiting and having diarrhea.  It appears she was volume depleted and taking nonsteroidals likely contributing to significant worsening of renal function.  UA done initially did not show any significant casts.  CT AP without any signs of obstruction. Continue IV fluid rehydration.  Gastroenteritis: Likely viral etiology. Supportive care.  Productive cough: Although WBC normal and afebrile, does have elevated procal and was placed on ABX as precaution.  Transaminitis: Etiology unclear. Hepatitis panel sent. Abdominal tenderness not near liver or gallbladder. Ultrasound pending.  DM II: continue the sliding scale insulin.      PLAN:    Continue IV fluid rehydration. Encourage oral intake. Renal function with very mild improvement. " No indications for dialysis at this point.  It is possible that if no improvement in renal function and may have some worsening, may require dialysis.  Details discussed with the patient and family.  Liver enzymes are improving.  Strict intake and output. Avoid known nephrotoxic meds and IV contrast exposure if able.  Noted low bicarb, will start sodium bicarbonate tablets.  Details were discussed with the patient as well as family in the room.    Details were also discussed with the hospitalist service and or other providers as needed.   Continue with rest of the current treatment plan, and monitor with surveillance labs.  Further recommendations will depend on clinical course of the patient during the current hospitalization.   I have reviewed the copied text to this note, it was edited and the changes made as needed.  It is accurate to the point, when the note was signed today.     Thank you for involving us in the care of Latonya Mei.  Please feel free to call with any questions.    Jamey Car MD, NASIR  12/10/24  21:15 [facility]    [facility]  [phone]      Part of this note may be an electronic transcription/translation of spoken language to printed text using the Dragon Dictation System.

## 2024-12-10 NOTE — CASE MANAGEMENT/SOCIAL WORK
Case Management/Social Work    Patient Name:  Latonya Mei  YOB: 1950  MRN: [id number]  Admit Date:  12/8/2024        09:10 EST  Plan to DC home once medically ready. CM will continue to follow.      Electronically signed by:  Santosh Hernandez RN  12/10/24 09:10 EST

## 2024-12-11 LAB
ALBUMIN SERPL-MCNC: 3.2 G/DL (ref 3.5–5.2)
ALBUMIN/GLOB SERPL: 1.2 G/DL
ALP SERPL-CCNC: 99 U/L (ref 39–117)
ALT SERPL W P-5'-P-CCNC: 113 U/L (ref 1–33)
ANION GAP SERPL CALCULATED.3IONS-SCNC: 11.6 MMOL/L (ref 5–15)
AST SERPL-CCNC: 26 U/L (ref 1–32)
BASOPHILS # BLD AUTO: 0.04 10*3/MM3 (ref 0–0.2)
BASOPHILS NFR BLD AUTO: 0.6 % (ref 0–1.5)
BILIRUB SERPL-MCNC: 0.2 MG/DL (ref 0–1.2)
BUN SERPL-MCNC: 45 MG/DL (ref 8–23)
BUN/CREAT SERPL: 7.8 (ref 7–25)
CALCIUM SPEC-SCNC: 8.5 MG/DL (ref 8.6–10.5)
CHLORIDE SERPL-SCNC: 109 MMOL/L (ref 98–107)
CO2 SERPL-SCNC: 20.4 MMOL/L (ref 22–29)
CREAT SERPL-MCNC: 5.79 MG/DL (ref 0.57–1)
DEPRECATED RDW RBC AUTO: 47.1 FL (ref 37–54)
EGFRCR SERPLBLD CKD-EPI 2021: 7.1 ML/MIN/1.73
EOSINOPHIL # BLD AUTO: 0.35 10*3/MM3 (ref 0–0.4)
EOSINOPHIL NFR BLD AUTO: 5.3 % (ref 0.3–6.2)
ERYTHROCYTE [DISTWIDTH] IN BLOOD BY AUTOMATED COUNT: 13.8 % (ref 12.3–15.4)
GLOBULIN UR ELPH-MCNC: 2.7 GM/DL
GLUCOSE BLDC GLUCOMTR-MCNC: 110 MG/DL (ref 70–130)
GLUCOSE BLDC GLUCOMTR-MCNC: 143 MG/DL (ref 70–130)
GLUCOSE BLDC GLUCOMTR-MCNC: 152 MG/DL (ref 70–130)
GLUCOSE BLDC GLUCOMTR-MCNC: 201 MG/DL (ref 70–130)
GLUCOSE SERPL-MCNC: 120 MG/DL (ref 65–99)
HCT VFR BLD AUTO: 29.1 % (ref 34–46.6)
HGB BLD-MCNC: 9.4 G/DL (ref 12–15.9)
IMM GRANULOCYTES # BLD AUTO: 0.03 10*3/MM3 (ref 0–0.05)
IMM GRANULOCYTES NFR BLD AUTO: 0.5 % (ref 0–0.5)
LYMPHOCYTES # BLD AUTO: 1.46 10*3/MM3 (ref 0.7–3.1)
LYMPHOCYTES NFR BLD AUTO: 22 % (ref 19.6–45.3)
MCH RBC QN AUTO: 30.1 PG (ref 26.6–33)
MCHC RBC AUTO-ENTMCNC: 32.3 G/DL (ref 31.5–35.7)
MCV RBC AUTO: 93.3 FL (ref 79–97)
MONOCYTES # BLD AUTO: 0.58 10*3/MM3 (ref 0.1–0.9)
MONOCYTES NFR BLD AUTO: 8.7 % (ref 5–12)
NEUTROPHILS NFR BLD AUTO: 4.19 10*3/MM3 (ref 1.7–7)
NEUTROPHILS NFR BLD AUTO: 62.9 % (ref 42.7–76)
NRBC BLD AUTO-RTO: 0 /100 WBC (ref 0–0.2)
PLATELET # BLD AUTO: 228 10*3/MM3 (ref 140–450)
PMV BLD AUTO: 10.2 FL (ref 6–12)
POTASSIUM SERPL-SCNC: 4.4 MMOL/L (ref 3.5–5.2)
PROT SERPL-MCNC: 5.9 G/DL (ref 6–8.5)
RBC # BLD AUTO: 3.12 10*6/MM3 (ref 3.77–5.28)
SODIUM SERPL-SCNC: 141 MMOL/L (ref 136–145)
WBC NRBC COR # BLD AUTO: 6.65 10*3/MM3 (ref 3.4–10.8)

## 2024-12-11 PROCEDURE — 97161 PT EVAL LOW COMPLEX 20 MIN: CPT

## 2024-12-11 PROCEDURE — 99232 SBSQ HOSP IP/OBS MODERATE 35: CPT | Performed by: STUDENT IN AN ORGANIZED HEALTH CARE EDUCATION/TRAINING PROGRAM

## 2024-12-11 PROCEDURE — 80053 COMPREHEN METABOLIC PANEL: CPT | Performed by: STUDENT IN AN ORGANIZED HEALTH CARE EDUCATION/TRAINING PROGRAM

## 2024-12-11 PROCEDURE — 85025 COMPLETE CBC W/AUTO DIFF WBC: CPT | Performed by: STUDENT IN AN ORGANIZED HEALTH CARE EDUCATION/TRAINING PROGRAM

## 2024-12-11 PROCEDURE — 25010000002 CEFTRIAXONE PER 250 MG: Performed by: INTERNAL MEDICINE

## 2024-12-11 PROCEDURE — 25010000002 HEPARIN (PORCINE) PER 1000 UNITS: Performed by: INTERNAL MEDICINE

## 2024-12-11 PROCEDURE — 82948 REAGENT STRIP/BLOOD GLUCOSE: CPT

## 2024-12-11 PROCEDURE — 63710000001 INSULIN LISPRO (HUMAN) PER 5 UNITS: Performed by: INTERNAL MEDICINE

## 2024-12-11 PROCEDURE — 82948 REAGENT STRIP/BLOOD GLUCOSE: CPT | Performed by: INTERNAL MEDICINE

## 2024-12-11 RX ORDER — AMLODIPINE BESYLATE 5 MG/1
5 TABLET ORAL
Status: DISCONTINUED | OUTPATIENT
Start: 2024-12-11 | End: 2024-12-11

## 2024-12-11 RX ADMIN — ASPIRIN 81 MG CHEWABLE TABLET 81 MG: 81 TABLET CHEWABLE at 09:07

## 2024-12-11 RX ADMIN — Medication 1 CAPSULE: at 09:07

## 2024-12-11 RX ADMIN — HYDROCODONE BITARTRATE AND ACETAMINOPHEN 1 TABLET: 7.5; 325 TABLET ORAL at 18:47

## 2024-12-11 RX ADMIN — LEVOTHYROXINE SODIUM 25 MCG: 0.03 TABLET ORAL at 06:02

## 2024-12-11 RX ADMIN — HEPARIN SODIUM 5000 UNITS: 5000 INJECTION INTRAVENOUS; SUBCUTANEOUS at 09:07

## 2024-12-11 RX ADMIN — ACETAMINOPHEN 650 MG: 325 TABLET, FILM COATED ORAL at 07:52

## 2024-12-11 RX ADMIN — Medication 10 ML: at 20:06

## 2024-12-11 RX ADMIN — SODIUM BICARBONATE 650 MG TABLET 1300 MG: at 09:07

## 2024-12-11 RX ADMIN — HEPARIN SODIUM 5000 UNITS: 5000 INJECTION INTRAVENOUS; SUBCUTANEOUS at 20:07

## 2024-12-11 RX ADMIN — SODIUM BICARBONATE 650 MG TABLET 1300 MG: at 20:07

## 2024-12-11 RX ADMIN — Medication 1 CAPSULE: at 20:07

## 2024-12-11 RX ADMIN — SODIUM BICARBONATE 650 MG TABLET 1300 MG: at 11:08

## 2024-12-11 RX ADMIN — SODIUM BICARBONATE 650 MG TABLET 1300 MG: at 17:10

## 2024-12-11 RX ADMIN — HYDROCODONE BITARTRATE AND ACETAMINOPHEN 1 TABLET: 7.5; 325 TABLET ORAL at 11:08

## 2024-12-11 RX ADMIN — SODIUM CHLORIDE 2000 MG: 9 INJECTION, SOLUTION INTRAVENOUS at 17:19

## 2024-12-11 RX ADMIN — Medication 10 ML: at 09:10

## 2024-12-11 RX ADMIN — PANTOPRAZOLE SODIUM 40 MG: 40 TABLET, DELAYED RELEASE ORAL at 06:02

## 2024-12-11 RX ADMIN — INSULIN LISPRO 2 UNITS: 100 INJECTION, SOLUTION INTRAVENOUS; SUBCUTANEOUS at 17:11

## 2024-12-11 RX ADMIN — INSULIN LISPRO 3 UNITS: 100 INJECTION, SOLUTION INTRAVENOUS; SUBCUTANEOUS at 20:07

## 2024-12-11 RX ADMIN — VENLAFAXINE HYDROCHLORIDE 37.5 MG: 37.5 CAPSULE, EXTENDED RELEASE ORAL at 09:32

## 2024-12-11 NOTE — PLAN OF CARE
Goal Outcome Evaluation:  Plan of Care Reviewed With: patient        Progress: no change  Outcome Evaluation: Pt participated in PT initial evaluation this date. Pt presents supine in bed with daughter at side, pleasant and agreeable to PT evaluation. Pt AOx4, reports 6/10 generalized headache. Pt reports at baseline, she lives at home with her  in a SSH with ramp to enter. Pt reports she is normally (I) with household/community ambulation without AD. Pt denies reports of falls at home. Pt states she has a RW at home if needed. Pt reports she is most limited by chronic L knee pain d/t a meniscal tear, states she is scheduled to have surgery in January. Pt performed supine > sit on EOB with supervision A, required increased time and effort. Pt performed STS transfer with min A and use of a RW. Pt ambulated x60' with CGA and RW. Pt demonstrated increased FF posture, decreased tiffanie, shuffling steps and dyspnea during gait. Following evaluation, pt left reclined in bedside chair with call light and all needs within reach. Recommend skilled PT intervention during IP admission to address identified deficits, reduce risk of falls and prevent functional decline. Once medically stable, recommend pt to d/c home with support from family and HHPT.    Anticipated Discharge Disposition (PT): home with assist, home with home health
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
## 2024-12-11 NOTE — PROGRESS NOTES
[facility]    Name:  Latonya Mei   Age:  74 y.o.  Sex:  female  :  1950  MRN:  [id number]   Visit Number:  [id number]  Admission Date:  2024  Date Of Service:  24  Primary Care Physician:  Chrystal Buckley APRN     LOS: 3 days :    Chief Complaint:      Follow-up; renal failure    Subjective:    Feels good today.  Still having cough but no chest pain.    Hospital Course:    Latonya Mei is a 74-year-old female with history of type 2 diabetes, hypothyroidism, GERD, fibromyalgia was brought to the emergency room by her daughter with multiple symptoms including chills, abdominal discomfort, nausea, vomiting, cough and shortness of breath.  Patient states that she has had chronic left knee joint pain due to torn meniscus.  She has been following up with Dr. Vazquez and apparently has been scheduled for surgery.  She has been taking 2 tablets of Motrin/Tylenol as needed for the last 3 weeks.  4 days ago, she started having epigastric pain nausea, vomiting, chills and low-grade fevers.  She called her primary care provider and apparently was seen by her and urine was sent.  She was placed on ciprofloxacin.  Patient however has not had any relief of symptoms and came to the emergency room.     Patient was last admitted to this facility in 2024 with similar complaints and at that time she was noted to have mild GORGE.  Due to shortness of breath, she was seen by cardiology and did undergo cardiac catheterization which was negative for any significant coronary artery disease at that time.     In the emergency room, she was afebrile and hemodynamically stable saturating at 97% on room air.  Initial CK was within normal range.  Troponin 21, proBNP 4000, sodium 132, CO2 19, anion gap 19, BUN 52, creatinine 6.84 (baseline 1.2), glucose 168, alkaline phosphatase 147, , .  Lactic acid was within normal range but procalcitonin is elevated at  4.83.  CBC was unremarkable except for a hemoglobin of 11 (baseline).  Monospot test was negative.  Urine analysis shows 3-5 RBCs but otherwise negative for any evidence of urinary tract infection.  Respiratory panel was negative.  Chest x-ray showed no acute process.  CT of the abdomen and pelvis without contrast was negative for any hydronephrosis or nephrolithiasis.  It showed diverticulosis, benign left renal cyst.  EKG was unremarkable.  Patient was given 2 L of normal saline bolus, Zofran and Zosyn in the emergency room.  Blood cultures were done and acute hepatitis panel was sent.         Review of Systems:     All systems were reviewed and negative except as mentioned in subjective, assessment and plan.    Vital Signs:    Temp:  [98 °F (36.7 °C)-98.3 °F (36.8 °C)] 98 °F (36.7 °C)  Heart Rate:  [69-70] 70  Resp:  [18] 18  BP: (155-188)/(63-78) 183/63    Intake and output:    I/O last 3 completed shifts:  In: 1957 [P.O.:840; I.V.:1117]  Out: 6600 [Urine:6600]  I/O this shift:  In: 480 [P.O.:480]  Out: 450 [Urine:450]    Physical Examination:    General Appearance:  Alert and cooperative.  Comfortable in bed.   Head:  Atraumatic and normocephalic.   Eyes: Conjunctivae and sclerae normal, no icterus. No pallor.   Throat: No oral lesions, no thrush, oral mucosa moist.   Neck: Supple, trachea midline, no thyromegaly.   Lungs:   Breath sounds heard bilaterally equally.  No wheezing or crackles. No Pleural rub or bronchial breathing.   Heart:  Normal S1 and S2, no murmur, no gallop, no rub. No JVD.   Abdomen:   Normal bowel sounds, no masses, no organomegaly. Soft, mild nonspecific tenderness, nondistended, no rebound tenderness.  Obese.   Extremities: Supple, no edema, no cyanosis, no clubbing.   Skin: Warm.   Neurologic: Alert and oriented x 3. No facial asymmetry. Moves all four limbs. No tremors.      Laboratory results:    Results from last 7 days   Lab Units 12/11/24  0726 12/10/24  0633 12/09/24  0539  "12/08/24  1333   SODIUM mmol/L 141 142 139 132*   POTASSIUM mmol/L 4.4 4.5 4.3 4.4   CHLORIDE mmol/L 109* 109* 107 94*   CO2 mmol/L 20.4* 18.7* 17.2* 19.0*   BUN mg/dL 45* 47* 48* 52*   CREATININE mg/dL 5.79* 6.47* 6.49* 6.84*   CALCIUM mg/dL 8.5* 8.4* 8.1* 9.1   BILIRUBIN mg/dL 0.2 0.2  --  0.5   ALK PHOS U/L 99 105  --  147*   ALT (SGPT) U/L 113* 157*  --  353*   AST (SGOT) U/L 26 36*  --  113*   GLUCOSE mg/dL 120* 107* 95 168*     Results from last 7 days   Lab Units 12/11/24  0726 12/10/24  0633 12/09/24  0539   WBC 10*3/mm3 6.65 6.27 4.84   HEMOGLOBIN g/dL 9.4* 9.6* 9.1*   HEMATOCRIT % 29.1* 29.9* 27.9*   PLATELETS 10*3/mm3 228 202 186         Results from last 7 days   Lab Units 12/08/24  1532 12/08/24  1333   CK TOTAL U/L  --  113   HSTROP T ng/L 18* 21*     Results from last 7 days   Lab Units 12/08/24  1515 12/08/24  1500   BLOODCX  No growth at 2 days No growth at 2 days     No results for input(s): \"PHART\", \"BJR0JDU\", \"PO2ART\", \"VHD6UCM\", \"BASEEXCESS\" in the last 8760 hours.   I have reviewed the patient's laboratory results.    Radiology results:    No radiology results from the last 24 hrs  I have reviewed the patient's radiology reports.    Medication Review:     I have reviewed the patient's active and prn medications.     Problem List:      GORGE (acute kidney injury)    Type 2 diabetes mellitus without complication, without long-term current use of insulin    Gastroesophageal reflux disease      Assessment/Plan:    Inpatient general floor admission 12/8/2024 with acute renal failure.  Due to elevated procalcitonin and cough, she was started on Rocephin empirically.    GORGE  Nephrology consultation, recommendations appreciated.  Discontinued IVF.  Gross catheter.  May need dialysis.  Likely secondary to NSAIDs and dehydration.  Strongly advised to discontinue NSAIDs.  CT of the abdomen negative for any hydronephrosis.  Constipation  Bowel regimen.  Hypertension  Likely due to renal status and fluid " overload.  Discontinued IVF per recommendation nephrology.  She will likely need dialysis.  Cough  Elevated procalcitonin  Rocephin empirically.  Blood cultures negative.  Transaminitis  Monitoring.  Possibly secondary to dehydration.  Viral hepatitis panel negative.  No specific right upper quadrant tenderness.    Chronic: type 2 diabetes, hypothyroidism, GERD, fibromyalgia  Subcutaneous insulin protocol.  Continue other home medications.    DVT Prophylaxis: Heparin  Code Status: Full code  Diet: Diabetic  Discharge Plan: At least several more days depending on clinical status.    Updated daughter Dorene during course.    Brian Joseph Kerley, DO  12/11/24  13:32 EST    Dictated utilizing Dragon dictation.    I have reviewed the copied text and it is accurate as of 12/11/2024

## 2024-12-11 NOTE — CASE MANAGEMENT/SOCIAL WORK
Met with pt and her daughter. Pt states she is feeling a little better. Her dcp is to return home with her spouse, no concerns voiced.

## 2024-12-11 NOTE — PLAN OF CARE
Goal Outcome Evaluation:  Plan of Care Reviewed With: patient        Progress: no change       BP elevated. Provider notified. Pt c/o headache, see MAR. Pt up in chair during shift.
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
## 2024-12-11 NOTE — PLAN OF CARE
Goal Outcome Evaluation:  Plan of Care Reviewed With: patient         VSS on RA. IV fluids administered per MAR. Sinus - sinus luis angel on telemetry.

## 2024-12-11 NOTE — PROGRESS NOTES
[facility] Progress Note  [facility]. [place]        Patient Name: Latonya Mei  : 1950  MRN: [id number]   LOS: 3 days    Patient Care Team:  Chrystal Buckley APRN as PCP - General (Nurse Practitioner)  Pao John MD as Consulting Physician (General Surgery)  Glen Ríos MD as Consulting Physician (Cardiology)  Nia García APRN as Nurse Practitioner (Gastroenterology)    Chief Complaint:    Chief Complaint   Patient presents with    Pain With Breathing    Cough     Primary Care Physician:  Chrystal Buckley APRN  Date of admission: 2024    Subjective     Interval History:   Follow-up acute kidney injury.  She is lying in the bed awake alert and interactive.  She does complain of shortness of breath but denies any chest pain.     Events noted from last 24 hours.  I reviewed the chart and other providers notes, labs and procedures done since my last note.    Review of Systems:   As noted above.    Objective     Vitals:   Temp:  [97.7 °F (36.5 °C)-98.3 °F (36.8 °C)] 98 °F (36.7 °C)  Heart Rate:  [69-70] 70  Resp:  [18] 18  BP: (155-188)/(68-78) 164/68    Intake/Output Summary (Last 24 hours) at 2024 0952  Last data filed at 2024 0900  Gross per 24 hour   Intake 1597 ml   Output 4550 ml   Net -2953 ml       Physical Exam:    Constitutional: Awake, alert  Eyes: sclerae anicteric, no conjunctival injection  HEENT: mucous membranes dry  Neck: Supple, no thyromegaly, no lymphadenopathy, trachea midline, No JVD  Respiratory: Crackles noted half of the chest today.  Cardiovascular: RRR, no murmurs, rubs, or gallops.  Gastrointestinal: Positive bowel sounds, obese, soft, + tender around epigastric area on palpation  Musculoskeletal: 1+ edema, no clubbing or cyanosis  Psychiatric: Appropriate affect, cooperative  Neurologic: Oriented x 3, moving all extremities, Cranial Nerves grossly intact, speech clear  Skin: warm and dry, no rashes      Scheduled Meds:     Current Facility-Administered Medications   Medication Dose Route Frequency Provider Last Rate Last Admin    acetaminophen (TYLENOL) tablet 650 mg  650 mg Oral Q4H PRN Juan Lovelace MD   650 mg at 12/11/24 0752    Or    acetaminophen (TYLENOL) 160 MG/5ML oral solution 650 mg  650 mg Oral Q4H PRN Juan Lovelace MD        Or    acetaminophen (TYLENOL) suppository 650 mg  650 mg Rectal Q4H PRN Juan Lovelace MD        aspirin chewable tablet 81 mg  81 mg Oral Daily Juan Lovelace MD   81 mg at 12/11/24 0907    sennosides-docusate (PERICOLACE) 8.6-50 MG per tablet 2 tablet  2 tablet Oral BID PRN Juan Lovelace MD   2 tablet at 12/10/24 0935    And    polyethylene glycol (MIRALAX) packet 17 g  17 g Oral Daily PRN Juan Lovelace MD        And    bisacodyl (DULCOLAX) EC tablet 5 mg  5 mg Oral Daily PRN Juan Lovelace MD        And    bisacodyl (DULCOLAX) suppository 10 mg  10 mg Rectal Daily PRN Juan Lovelace MD        cefTRIAXone (ROCEPHIN) 2,000 mg in sodium chloride 0.9 % 100 mL IVPB-VTB  2,000 mg Intravenous Q24H Juan Lovelace  mL/hr at 12/10/24 1742 2,000 mg at 12/10/24 1742    dextrose (D50W) (25 g/50 mL) IV injection 25 g  25 g Intravenous Q15 Min PRN Juan Lovelace MD        dextrose (GLUTOSE) oral gel 15 g  15 g Oral Q15 Min PRN Juan Lovelace MD        glucagon (GLUCAGEN) injection 1 mg  1 mg Intramuscular Q15 Min PRN Juan Lovelace MD        heparin (porcine) 5000 UNIT/ML injection 5,000 Units  5,000 Units Subcutaneous Q12H Juan Lovelace MD   5,000 Units at 12/11/24 0907    HYDROcodone-acetaminophen (NORCO) 7.5-325 MG per tablet 1 tablet  1 tablet Oral Q6H PRN Juan Lovelace MD   1 tablet at 12/10/24 1742    Insulin Lispro (humaLOG) injection 2-7 Units  2-7 Units Subcutaneous 4x Daily AC & at Bedtime Juan Lovelace MD   2 Units at 12/10/24 2037    lactobacillus acidophilus (RISAQUAD) capsule 1 capsule  1 capsule Oral BID Juan Lovelace MD   1 capsule at 12/11/24 0907    levothyroxine  (SYNTHROID, LEVOTHROID) tablet 25 mcg  25 mcg Oral Daily Juan Lovelace MD   25 mcg at 12/11/24 0602    ondansetron (ZOFRAN) injection 4 mg  4 mg Intravenous Q6H PRN Juan Lovelace MD        pantoprazole (PROTONIX) EC tablet 40 mg  40 mg Oral Q AM Juan Lovelace MD   40 mg at 12/11/24 0602    promethazine (PHENERGAN) tablet 12.5 mg  12.5 mg Oral Q8H PRN Juan Lovelace MD        sodium bicarbonate tablet 1,300 mg  1,300 mg Oral 4x Daily Jamey Car MD, FASN   1,300 mg at 12/11/24 0907    sodium chloride 0.9 % flush 10 mL  10 mL Intravenous Q12H Juan Lovelace MD   10 mL at 12/11/24 0910    sodium chloride 0.9 % flush 10 mL  10 mL Intravenous PRN Juan Lovelace MD        sodium chloride 0.9 % infusion 40 mL  40 mL Intravenous PRN Juan Lovelace MD        venlafaxine XR (EFFEXOR-XR) 24 hr capsule 37.5 mg  37.5 mg Oral Daily Juan Lovelace MD   37.5 mg at 12/11/24 0932       aspirin, 81 mg, Oral, Daily  cefTRIAXone, 2,000 mg, Intravenous, Q24H  heparin (porcine), 5,000 Units, Subcutaneous, Q12H  insulin lispro, 2-7 Units, Subcutaneous, 4x Daily AC & at Bedtime  lactobacillus acidophilus, 1 capsule, Oral, BID  levothyroxine, 25 mcg, Oral, Daily  pantoprazole, 40 mg, Oral, Q AM  sodium bicarbonate, 1,300 mg, Oral, 4x Daily  sodium chloride, 10 mL, Intravenous, Q12H  venlafaxine XR, 37.5 mg, Oral, Daily        IV Meds:          Results Reviewed:   I have personally reviewed the results from the time of this admission to 12/11/2024 09:52 EST     Results from last 7 days   Lab Units 12/11/24  0726 12/10/24  0633 12/09/24  0539 12/08/24  1333   SODIUM mmol/L 141 142 139 132*   POTASSIUM mmol/L 4.4 4.5 4.3 4.4   CHLORIDE mmol/L 109* 109* 107 94*   CO2 mmol/L 20.4* 18.7* 17.2* 19.0*   BUN mg/dL 45* 47* 48* 52*   CREATININE mg/dL 5.79* 6.47* 6.49* 6.84*   CALCIUM mg/dL 8.5* 8.4* 8.1* 9.1   BILIRUBIN mg/dL 0.2 0.2  --  0.5   ALK PHOS U/L 99 105  --  147*   ALT (SGPT) U/L 113* 157*  --  353*   AST (SGOT) U/L 26 36*  --  113*   GLUCOSE  "mg/dL 120* 107* 95 168*       Estimated Creatinine Clearance: 8.7 mL/min (A) (by C-G formula based on SCr of 5.79 mg/dL (H)).                Results from last 7 days   Lab Units 12/11/24  0726 12/10/24  0633 12/09/24  0539 12/08/24  1333   WBC 10*3/mm3 6.65 6.27 4.84 5.04   HEMOGLOBIN g/dL 9.4* 9.6* 9.1* 11.1*   PLATELETS 10*3/mm3 228 202 186 203             Brief Urine Lab Results  (Last result in the past 365 days)        Color   Clarity   Blood   Leuk Est   Nitrite   Protein   CREAT   Urine HCG        12/08/24 1334 Yellow   Cloudy   Small (1+)   Negative   Negative   30 mg/dL (1+)                   No results found for: \"UTPCR\"    Imaging Results (Last 24 Hours)       ** No results found for the last 24 hours. **                Assessment / Plan     ASSESSMENT:  GORGE: Presented with sCr 6.84 on baseline ~1.2.  On initial presentation she was noted to be slightly hyponatremic and does have significantly low bicarb but electrolyte appears to be stable.  Likely pre-renal due to volume loss, hypovolemia as patient has been vomiting and having diarrhea.  It appears she was volume depleted and taking nonsteroidals likely contributing to significant worsening of renal function.  UA done initially did not show any significant casts.  CT AP without any signs of obstruction. Continue IV fluid rehydration.  Gastroenteritis: Likely viral etiology. Supportive care.  Productive cough: Although WBC normal and afebrile, does have elevated procal and was placed on ABX as precaution.  Transaminitis: Etiology unclear. Hepatitis panel sent. Abdominal tenderness not near liver or gallbladder. Ultrasound pending.  DM II: continue the sliding scale insulin.      PLAN:    Patient appears to be getting fluid overloaded, although she is making fair amount of urine.  Will stop the IV fluids and see where she settles down over the next 24 hours.  Renal function appears to be slightly better not sure if this is all dilutional or there is true " improvement in her renal function at this point.  If no significant improvement may have to consider dialysis, patient and family does understand will have to take day by day.  Strict intake and output. Avoid known nephrotoxic meds and IV contrast exposure if able.  Serum bicarb is starting to improve.  Details were discussed with the patient as well as family in the room.    Details were also discussed with the hospitalist service and or other providers as needed.   Continue with rest of the current treatment plan, and monitor with surveillance labs.  Further recommendations will depend on clinical course of the patient during the current hospitalization.   I have reviewed the copied text to this note, it was edited and the changes made as needed.  It is accurate to the point, when the note was signed today.     Thank you for involving us in the care of Latonya Mei.  Please feel free to call with any questions.    Jamey Car MD, FELICIAN  12/11/24  09:52 EST    [facility]  [phone]      Part of this note may be an electronic transcription/translation of spoken language to printed text using the Dragon Dictation System.

## 2024-12-12 LAB
ALBUMIN SERPL-MCNC: 3.3 G/DL (ref 3.5–5.2)
ALBUMIN/GLOB SERPL: 1.4 G/DL
ALP SERPL-CCNC: 89 U/L (ref 39–117)
ALT SERPL W P-5'-P-CCNC: 85 U/L (ref 1–33)
ANION GAP SERPL CALCULATED.3IONS-SCNC: 13.3 MMOL/L (ref 5–15)
AST SERPL-CCNC: 22 U/L (ref 1–32)
BASOPHILS # BLD AUTO: 0.04 10*3/MM3 (ref 0–0.2)
BASOPHILS NFR BLD AUTO: 0.6 % (ref 0–1.5)
BILIRUB SERPL-MCNC: 0.2 MG/DL (ref 0–1.2)
BUN SERPL-MCNC: 40 MG/DL (ref 8–23)
BUN/CREAT SERPL: 7 (ref 7–25)
CALCIUM SPEC-SCNC: 8.7 MG/DL (ref 8.6–10.5)
CHLORIDE SERPL-SCNC: 105 MMOL/L (ref 98–107)
CO2 SERPL-SCNC: 23.7 MMOL/L (ref 22–29)
CREAT SERPL-MCNC: 5.71 MG/DL (ref 0.57–1)
DEPRECATED RDW RBC AUTO: 45.9 FL (ref 37–54)
EGFRCR SERPLBLD CKD-EPI 2021: 7.2 ML/MIN/1.73
EOSINOPHIL # BLD AUTO: 0.45 10*3/MM3 (ref 0–0.4)
EOSINOPHIL NFR BLD AUTO: 6.3 % (ref 0.3–6.2)
ERYTHROCYTE [DISTWIDTH] IN BLOOD BY AUTOMATED COUNT: 13.6 % (ref 12.3–15.4)
GLOBULIN UR ELPH-MCNC: 2.4 GM/DL
GLUCOSE BLDC GLUCOMTR-MCNC: 118 MG/DL (ref 70–130)
GLUCOSE BLDC GLUCOMTR-MCNC: 123 MG/DL (ref 70–130)
GLUCOSE BLDC GLUCOMTR-MCNC: 126 MG/DL (ref 70–130)
GLUCOSE BLDC GLUCOMTR-MCNC: 152 MG/DL (ref 70–130)
GLUCOSE SERPL-MCNC: 116 MG/DL (ref 65–99)
HCT VFR BLD AUTO: 28.3 % (ref 34–46.6)
HGB BLD-MCNC: 9.4 G/DL (ref 12–15.9)
IMM GRANULOCYTES # BLD AUTO: 0.05 10*3/MM3 (ref 0–0.05)
IMM GRANULOCYTES NFR BLD AUTO: 0.7 % (ref 0–0.5)
LYMPHOCYTES # BLD AUTO: 1.6 10*3/MM3 (ref 0.7–3.1)
LYMPHOCYTES NFR BLD AUTO: 22.3 % (ref 19.6–45.3)
MCH RBC QN AUTO: 30.4 PG (ref 26.6–33)
MCHC RBC AUTO-ENTMCNC: 33.2 G/DL (ref 31.5–35.7)
MCV RBC AUTO: 91.6 FL (ref 79–97)
MONOCYTES # BLD AUTO: 0.57 10*3/MM3 (ref 0.1–0.9)
MONOCYTES NFR BLD AUTO: 7.9 % (ref 5–12)
NEUTROPHILS NFR BLD AUTO: 4.47 10*3/MM3 (ref 1.7–7)
NEUTROPHILS NFR BLD AUTO: 62.2 % (ref 42.7–76)
NRBC BLD AUTO-RTO: 0 /100 WBC (ref 0–0.2)
PLATELET # BLD AUTO: 262 10*3/MM3 (ref 140–450)
PMV BLD AUTO: 9.9 FL (ref 6–12)
POTASSIUM SERPL-SCNC: 4.5 MMOL/L (ref 3.5–5.2)
PROT SERPL-MCNC: 5.7 G/DL (ref 6–8.5)
RBC # BLD AUTO: 3.09 10*6/MM3 (ref 3.77–5.28)
SODIUM SERPL-SCNC: 142 MMOL/L (ref 136–145)
WBC NRBC COR # BLD AUTO: 7.18 10*3/MM3 (ref 3.4–10.8)

## 2024-12-12 PROCEDURE — 63710000001 INSULIN LISPRO (HUMAN) PER 5 UNITS: Performed by: INTERNAL MEDICINE

## 2024-12-12 PROCEDURE — 25010000002 CEFTRIAXONE PER 250 MG: Performed by: INTERNAL MEDICINE

## 2024-12-12 PROCEDURE — 25010000002 HYDRALAZINE PER 20 MG: Performed by: STUDENT IN AN ORGANIZED HEALTH CARE EDUCATION/TRAINING PROGRAM

## 2024-12-12 PROCEDURE — 25010000002 HEPARIN (PORCINE) PER 1000 UNITS: Performed by: INTERNAL MEDICINE

## 2024-12-12 PROCEDURE — 85025 COMPLETE CBC W/AUTO DIFF WBC: CPT | Performed by: STUDENT IN AN ORGANIZED HEALTH CARE EDUCATION/TRAINING PROGRAM

## 2024-12-12 PROCEDURE — 97110 THERAPEUTIC EXERCISES: CPT

## 2024-12-12 PROCEDURE — 99232 SBSQ HOSP IP/OBS MODERATE 35: CPT | Performed by: STUDENT IN AN ORGANIZED HEALTH CARE EDUCATION/TRAINING PROGRAM

## 2024-12-12 PROCEDURE — 82948 REAGENT STRIP/BLOOD GLUCOSE: CPT

## 2024-12-12 PROCEDURE — 25010000002 ONDANSETRON PER 1 MG: Performed by: INTERNAL MEDICINE

## 2024-12-12 PROCEDURE — 25010000002 FUROSEMIDE PER 20 MG: Performed by: INTERNAL MEDICINE

## 2024-12-12 PROCEDURE — 80053 COMPREHEN METABOLIC PANEL: CPT | Performed by: STUDENT IN AN ORGANIZED HEALTH CARE EDUCATION/TRAINING PROGRAM

## 2024-12-12 PROCEDURE — 97116 GAIT TRAINING THERAPY: CPT

## 2024-12-12 RX ORDER — FUROSEMIDE 10 MG/ML
80 INJECTION INTRAMUSCULAR; INTRAVENOUS ONCE
Status: COMPLETED | OUTPATIENT
Start: 2024-12-12 | End: 2024-12-12

## 2024-12-12 RX ORDER — AMLODIPINE BESYLATE 5 MG/1
5 TABLET ORAL
Status: DISCONTINUED | OUTPATIENT
Start: 2024-12-12 | End: 2024-12-12

## 2024-12-12 RX ORDER — AMLODIPINE BESYLATE 5 MG/1
10 TABLET ORAL
Status: DISCONTINUED | OUTPATIENT
Start: 2024-12-13 | End: 2024-12-15 | Stop reason: HOSPADM

## 2024-12-12 RX ORDER — HYDRALAZINE HYDROCHLORIDE 20 MG/ML
10 INJECTION INTRAMUSCULAR; INTRAVENOUS EVERY 4 HOURS PRN
Status: DISCONTINUED | OUTPATIENT
Start: 2024-12-12 | End: 2024-12-15 | Stop reason: HOSPADM

## 2024-12-12 RX ADMIN — SODIUM CHLORIDE 2000 MG: 9 INJECTION, SOLUTION INTRAVENOUS at 18:21

## 2024-12-12 RX ADMIN — Medication 10 ML: at 23:55

## 2024-12-12 RX ADMIN — INSULIN LISPRO 2 UNITS: 100 INJECTION, SOLUTION INTRAVENOUS; SUBCUTANEOUS at 20:48

## 2024-12-12 RX ADMIN — HYDROCODONE BITARTRATE AND ACETAMINOPHEN 1 TABLET: 7.5; 325 TABLET ORAL at 13:35

## 2024-12-12 RX ADMIN — SODIUM BICARBONATE 650 MG TABLET 1300 MG: at 11:47

## 2024-12-12 RX ADMIN — FUROSEMIDE 80 MG: 10 INJECTION, SOLUTION INTRAMUSCULAR; INTRAVENOUS at 11:04

## 2024-12-12 RX ADMIN — INSULIN LISPRO 2 UNITS: 100 INJECTION, SOLUTION INTRAVENOUS; SUBCUTANEOUS at 08:20

## 2024-12-12 RX ADMIN — LEVOTHYROXINE SODIUM 25 MCG: 0.03 TABLET ORAL at 06:30

## 2024-12-12 RX ADMIN — ASPIRIN 81 MG CHEWABLE TABLET 81 MG: 81 TABLET CHEWABLE at 08:20

## 2024-12-12 RX ADMIN — ONDANSETRON 4 MG: 2 INJECTION INTRAMUSCULAR; INTRAVENOUS at 16:58

## 2024-12-12 RX ADMIN — PANTOPRAZOLE SODIUM 40 MG: 40 TABLET, DELAYED RELEASE ORAL at 06:30

## 2024-12-12 RX ADMIN — Medication 10 ML: at 08:20

## 2024-12-12 RX ADMIN — SODIUM BICARBONATE 650 MG TABLET 1300 MG: at 20:48

## 2024-12-12 RX ADMIN — SODIUM BICARBONATE 650 MG TABLET 1300 MG: at 08:19

## 2024-12-12 RX ADMIN — Medication 1 CAPSULE: at 08:20

## 2024-12-12 RX ADMIN — SODIUM BICARBONATE 650 MG TABLET 1300 MG: at 17:23

## 2024-12-12 RX ADMIN — VENLAFAXINE HYDROCHLORIDE 37.5 MG: 37.5 CAPSULE, EXTENDED RELEASE ORAL at 08:20

## 2024-12-12 RX ADMIN — HYDROCODONE BITARTRATE AND ACETAMINOPHEN 1 TABLET: 7.5; 325 TABLET ORAL at 23:55

## 2024-12-12 RX ADMIN — HEPARIN SODIUM 5000 UNITS: 5000 INJECTION INTRAVENOUS; SUBCUTANEOUS at 20:48

## 2024-12-12 RX ADMIN — Medication 1 CAPSULE: at 20:48

## 2024-12-12 RX ADMIN — HEPARIN SODIUM 5000 UNITS: 5000 INJECTION INTRAVENOUS; SUBCUTANEOUS at 08:20

## 2024-12-12 RX ADMIN — ACETAMINOPHEN 650 MG: 325 TABLET, FILM COATED ORAL at 06:40

## 2024-12-12 RX ADMIN — AMLODIPINE BESYLATE 5 MG: 5 TABLET ORAL at 17:23

## 2024-12-12 RX ADMIN — HYDRALAZINE HYDROCHLORIDE 10 MG: 20 INJECTION INTRAMUSCULAR; INTRAVENOUS at 14:35
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
## 2024-12-12 NOTE — PROGRESS NOTES
[facility]    Name:  Latonya Mei   Age:  74 y.o.  Sex:  female  :  1950  MRN:  [id number]   Visit Number:  [id number]  Admission Date:  2024  Date Of Service:  24  Primary Care Physician:  Chrystal Buckley APRN     LOS: 4 days :    Chief Complaint:      Follow-up; renal failure    Subjective:    Feels ok today.  Still having cough but no chest pain.    Hospital Course:    Latonya Mei is a 74-year-old female with history of type 2 diabetes, hypothyroidism, GERD, fibromyalgia was brought to the emergency room by her daughter with multiple symptoms including chills, abdominal discomfort, nausea, vomiting, cough and shortness of breath.  Patient states that she has had chronic left knee joint pain due to torn meniscus.  She has been following up with Dr. Vazquez and apparently has been scheduled for surgery.  She has been taking 2 tablets of Motrin/Tylenol as needed for the last 3 weeks.  4 days ago, she started having epigastric pain nausea, vomiting, chills and low-grade fevers.  She called her primary care provider and apparently was seen by her and urine was sent.  She was placed on ciprofloxacin.  Patient however has not had any relief of symptoms and came to the emergency room.     Patient was last admitted to this facility in 2024 with similar complaints and at that time she was noted to have mild GORGE.  Due to shortness of breath, she was seen by cardiology and did undergo cardiac catheterization which was negative for any significant coronary artery disease at that time.     In the emergency room, she was afebrile and hemodynamically stable saturating at 97% on room air.  Initial CK was within normal range.  Troponin 21, proBNP 4000, sodium 132, CO2 19, anion gap 19, BUN 52, creatinine 6.84 (baseline 1.2), glucose 168, alkaline phosphatase 147, , .  Lactic acid was within normal range but procalcitonin is elevated at  4.83.  CBC was unremarkable except for a hemoglobin of 11 (baseline).  Monospot test was negative.  Urine analysis shows 3-5 RBCs but otherwise negative for any evidence of urinary tract infection.  Respiratory panel was negative.  Chest x-ray showed no acute process.  CT of the abdomen and pelvis without contrast was negative for any hydronephrosis or nephrolithiasis.  It showed diverticulosis, benign left renal cyst.  EKG was unremarkable.  Patient was given 2 L of normal saline bolus, Zofran and Zosyn in the emergency room.  Blood cultures were done and acute hepatitis panel was sent.         Review of Systems:     All systems were reviewed and negative except as mentioned in subjective, assessment and plan.    Vital Signs:    Temp:  [97.9 °F (36.6 °C)-98.4 °F (36.9 °C)] 98.2 °F (36.8 °C)  Heart Rate:  [63-73] 63  Resp:  [17-18] 18  BP: (153-192)/(66-84) 192/66    Intake and output:    I/O last 3 completed shifts:  In: 1597 [P.O.:480; I.V.:1117]  Out: 6000 [Urine:6000]  I/O this shift:  In: 120 [P.O.:120]  Out: 2000 [Urine:2000]    Physical Examination:    General Appearance:  Alert and cooperative.  Comfortable in bed.   Head:  Atraumatic and normocephalic.   Eyes: Conjunctivae and sclerae normal, no icterus. No pallor.   Throat: No oral lesions, no thrush, oral mucosa moist.   Neck: Supple, trachea midline, no thyromegaly.   Lungs:   Breath sounds heard bilaterally equally.  No wheezing or crackles. No Pleural rub or bronchial breathing.   Heart:  Normal S1 and S2, no murmur, no gallop, no rub. No JVD.   Abdomen:   Normal bowel sounds, no masses, no organomegaly. Soft, mild nonspecific tenderness, nondistended, no rebound tenderness.  Obese.   Extremities: Supple, bilateral lower extremity pitting edema, no cyanosis, no clubbing.   Skin: Warm.   Neurologic: Alert and oriented x 3. No facial asymmetry. Moves all four limbs. No tremors.      Laboratory results:    Results from last 7 days   Lab Units  "12/12/24  0643 12/11/24  0726 12/10/24  0633   SODIUM mmol/L 142 141 142   POTASSIUM mmol/L 4.5 4.4 4.5   CHLORIDE mmol/L 105 109* 109*   CO2 mmol/L 23.7 20.4* 18.7*   BUN mg/dL 40* 45* 47*   CREATININE mg/dL 5.71* 5.79* 6.47*   CALCIUM mg/dL 8.7 8.5* 8.4*   BILIRUBIN mg/dL 0.2 0.2 0.2   ALK PHOS U/L 89 99 105   ALT (SGPT) U/L 85* 113* 157*   AST (SGOT) U/L 22 26 36*   GLUCOSE mg/dL 116* 120* 107*     Results from last 7 days   Lab Units 12/12/24  0643 12/11/24  0726 12/10/24  0633   WBC 10*3/mm3 7.18 6.65 6.27   HEMOGLOBIN g/dL 9.4* 9.4* 9.6*   HEMATOCRIT % 28.3* 29.1* 29.9*   PLATELETS 10*3/mm3 262 228 202         Results from last 7 days   Lab Units 12/08/24  1532 12/08/24  1333   CK TOTAL U/L  --  113   HSTROP T ng/L 18* 21*     Results from last 7 days   Lab Units 12/08/24  1515 12/08/24  1500   BLOODCX  No growth at 3 days No growth at 3 days     No results for input(s): \"PHART\", \"KSQ0ZBP\", \"PO2ART\", \"JFP2KQX\", \"BASEEXCESS\" in the last 8760 hours.   I have reviewed the patient's laboratory results.    Radiology results:    No radiology results from the last 24 hrs  I have reviewed the patient's radiology reports.    Medication Review:     I have reviewed the patient's active and prn medications.     Problem List:      GORGE (acute kidney injury)    Type 2 diabetes mellitus without complication, without long-term current use of insulin    Gastroesophageal reflux disease      Assessment/Plan:    Inpatient general floor admission 12/8/2024 with acute renal failure.  Due to elevated procalcitonin and cough, she was started on Rocephin empirically.    GORGE  Nephrology consultation, recommendations appreciated.  Discontinued IVF.  Gross catheter.  May need dialysis.  Likely secondary to NSAIDs and dehydration.  Strongly advised to discontinue NSAIDs.  CT of the abdomen negative for any hydronephrosis.  Constipation  Bowel regimen.  Hypertension  Likely due to renal status and fluid overload.  Discontinued IVF per " recommendation nephrology.  She will likely need dialysis.  Cough  Elevated procalcitonin  Rocephin empirically.  Blood cultures negative.  Transaminitis  Monitoring.  Possibly secondary to dehydration.  Viral hepatitis panel negative.  No specific right upper quadrant tenderness.    Chronic: type 2 diabetes, hypothyroidism, GERD, fibromyalgia  Subcutaneous insulin protocol.  Continue other home medications.    DVT Prophylaxis: Heparin  Code Status: Full code  Diet: Diabetic  Discharge Plan: Expecting prolonged course due to likely requiring dialysis initiation    Updated daughter Dorene during course.    Brian Joseph Kerley,   12/12/24  14:32 EST    Dictated utilizing Dragon dictation.    I have reviewed the copied text and it is accurate as of 12/12/2024

## 2024-12-12 NOTE — CASE MANAGEMENT/SOCIAL WORK
Dcp unchanged, pt plans to return home with spouse. Her daughter will provide transportation. Nephrology consult pending.

## 2024-12-12 NOTE — PLAN OF CARE
Goal Outcome Evaluation:  Plan of Care Reviewed With: patient      Pts BP elevated during shift, Dr. Kerley aware, see orders. Pt on RA and NSR on telemetry throughout the shift. C/o pain and nausea managed with PRN medications. Pt worked with PT during shift, sat up in chair during shift. FSBS monitored per orders, insulin administered per sliding scale. Discharge is pending.

## 2024-12-12 NOTE — PROGRESS NOTES
[facility] Progress Note  [facility]. [place]        Patient Name: Latonya Mei  : 1950  MRN: [id number]   LOS: 4 days    Patient Care Team:  Chrystal Buckley APRN as PCP - General (Nurse Practitioner)  Pao John MD as Consulting Physician (General Surgery)  Glen Ríos MD as Consulting Physician (Cardiology)  Nia García APRN as Nurse Practitioner (Gastroenterology)    Chief Complaint:    Chief Complaint   Patient presents with    Pain With Breathing    Cough     Primary Care Physician:  Chrystal Buckley APRN  Date of admission: 2024    Subjective     Interval History:   Follow-up acute kidney injury.  She is lying in the bed awake alert and interactive.  She does complain of shortness of breath but denies any chest pain.  She did mention that her hands and feet are still swollen although she has not received any fluid for the last 24 hours.  Events noted from last 24 hours.  I reviewed the chart and other providers notes, labs and procedures done since my last note.    Review of Systems:   As noted above.    Objective     Vitals:   Temp:  [98 °F (36.7 °C)-98.4 °F (36.9 °C)] 98.3 °F (36.8 °C)  Heart Rate:  [69-70] 70  Resp:  [18] 18  BP: (153-188)/(63-84) 168/84    Intake/Output Summary (Last 24 hours) at 2024 0641  Last data filed at 2024 0300  Gross per 24 hour   Intake 480 ml   Output 4400 ml   Net -3920 ml       Physical Exam:    Constitutional: Awake, alert  Eyes: sclerae anicteric, no conjunctival injection  HEENT: mucous membranes dry  Neck: Supple, no thyromegaly, no lymphadenopathy, trachea midline, No JVD  Respiratory: Crackles noted half of the chest today.  Cardiovascular: RRR, no murmurs, rubs, or gallops.  Gastrointestinal: Positive bowel sounds, obese, soft, + tender around epigastric area on palpation  Musculoskeletal: 1+ edema, no clubbing or cyanosis  Psychiatric: Appropriate affect, cooperative  Neurologic:  Oriented x 3, moving all extremities, Cranial Nerves grossly intact, speech clear  Skin: warm and dry, no rashes     Scheduled Meds:     Current Facility-Administered Medications   Medication Dose Route Frequency Provider Last Rate Last Admin    acetaminophen (TYLENOL) tablet 650 mg  650 mg Oral Q4H PRN Juan Lovelace MD   650 mg at 12/12/24 0640    Or    acetaminophen (TYLENOL) 160 MG/5ML oral solution 650 mg  650 mg Oral Q4H PRN Juan Lovelace MD        Or    acetaminophen (TYLENOL) suppository 650 mg  650 mg Rectal Q4H PRN Juan Lovelace MD        aspirin chewable tablet 81 mg  81 mg Oral Daily Juan Lovelace MD   81 mg at 12/11/24 0907    sennosides-docusate (PERICOLACE) 8.6-50 MG per tablet 2 tablet  2 tablet Oral BID PRN Juan Lovelace MD   2 tablet at 12/10/24 0935    And    polyethylene glycol (MIRALAX) packet 17 g  17 g Oral Daily PRN Juan Lovelace MD        And    bisacodyl (DULCOLAX) EC tablet 5 mg  5 mg Oral Daily PRN Juan Lovelace MD        And    bisacodyl (DULCOLAX) suppository 10 mg  10 mg Rectal Daily PRN Juan Lovelace MD        cefTRIAXone (ROCEPHIN) 2,000 mg in sodium chloride 0.9 % 100 mL IVPB-VTB  2,000 mg Intravenous Q24H Juan Lovelace  mL/hr at 12/11/24 1719 2,000 mg at 12/11/24 1719    dextrose (D50W) (25 g/50 mL) IV injection 25 g  25 g Intravenous Q15 Min PRN Juan Lovelace MD        dextrose (GLUTOSE) oral gel 15 g  15 g Oral Q15 Min PRN Juan Lovelace MD        glucagon (GLUCAGEN) injection 1 mg  1 mg Intramuscular Q15 Min PRN Juan Lovelace MD        heparin (porcine) 5000 UNIT/ML injection 5,000 Units  5,000 Units Subcutaneous Q12H Juan Lovelace MD   5,000 Units at 12/11/24 2007    HYDROcodone-acetaminophen (NORCO) 7.5-325 MG per tablet 1 tablet  1 tablet Oral Q6H PRN Juan Lovelace MD   1 tablet at 12/11/24 1847    Insulin Lispro (humaLOG) injection 2-7 Units  2-7 Units Subcutaneous 4x Daily AC & at Bedtime Juan Lovelace MD   3 Units at 12/11/24 2007    lactobacillus acidophilus  (RISAQUAD) capsule 1 capsule  1 capsule Oral BID Juan Lovelace MD   1 capsule at 12/11/24 2007    levothyroxine (SYNTHROID, LEVOTHROID) tablet 25 mcg  25 mcg Oral Daily Juan Lovelace MD   25 mcg at 12/12/24 0630    ondansetron (ZOFRAN) injection 4 mg  4 mg Intravenous Q6H PRN Juan Lovelace MD        pantoprazole (PROTONIX) EC tablet 40 mg  40 mg Oral Q AM Juan Lovelace MD   40 mg at 12/12/24 0630    promethazine (PHENERGAN) tablet 12.5 mg  12.5 mg Oral Q8H PRN Juan Lovelace MD        sodium bicarbonate tablet 1,300 mg  1,300 mg Oral 4x Daily Jamey Car MD, FASN   1,300 mg at 12/11/24 2007    sodium chloride 0.9 % flush 10 mL  10 mL Intravenous Q12H Juan Lovelace MD   10 mL at 12/11/24 2006    sodium chloride 0.9 % flush 10 mL  10 mL Intravenous PRN Juan Lovelace MD        sodium chloride 0.9 % infusion 40 mL  40 mL Intravenous PRN Juan Lovelace MD        venlafaxine XR (EFFEXOR-XR) 24 hr capsule 37.5 mg  37.5 mg Oral Daily Juan Lovelace MD   37.5 mg at 12/11/24 0932       aspirin, 81 mg, Oral, Daily  cefTRIAXone, 2,000 mg, Intravenous, Q24H  heparin (porcine), 5,000 Units, Subcutaneous, Q12H  insulin lispro, 2-7 Units, Subcutaneous, 4x Daily AC & at Bedtime  lactobacillus acidophilus, 1 capsule, Oral, BID  levothyroxine, 25 mcg, Oral, Daily  pantoprazole, 40 mg, Oral, Q AM  sodium bicarbonate, 1,300 mg, Oral, 4x Daily  sodium chloride, 10 mL, Intravenous, Q12H  venlafaxine XR, 37.5 mg, Oral, Daily        IV Meds:          Results Reviewed:   I have personally reviewed the results from the time of this admission to 12/12/2024 06:41 EST     Results from last 7 days   Lab Units 12/11/24  0726 12/10/24  0633 12/09/24  0539 12/08/24  1333   SODIUM mmol/L 141 142 139 132*   POTASSIUM mmol/L 4.4 4.5 4.3 4.4   CHLORIDE mmol/L 109* 109* 107 94*   CO2 mmol/L 20.4* 18.7* 17.2* 19.0*   BUN mg/dL 45* 47* 48* 52*   CREATININE mg/dL 5.79* 6.47* 6.49* 6.84*   CALCIUM mg/dL 8.5* 8.4* 8.1* 9.1   BILIRUBIN mg/dL 0.2 0.2  --  0.5  "  ALK PHOS U/L 99 105  --  147*   ALT (SGPT) U/L 113* 157*  --  353*   AST (SGOT) U/L 26 36*  --  113*   GLUCOSE mg/dL 120* 107* 95 168*       Estimated Creatinine Clearance: 8.7 mL/min (A) (by C-G formula based on SCr of 5.79 mg/dL (H)).                Results from last 7 days   Lab Units 12/11/24  0726 12/10/24  0633 12/09/24  0539 12/08/24  1333   WBC 10*3/mm3 6.65 6.27 4.84 5.04   HEMOGLOBIN g/dL 9.4* 9.6* 9.1* 11.1*   PLATELETS 10*3/mm3 228 202 186 203             Brief Urine Lab Results  (Last result in the past 365 days)        Color   Clarity   Blood   Leuk Est   Nitrite   Protein   CREAT   Urine HCG        12/08/24 1334 Yellow   Cloudy   Small (1+)   Negative   Negative   30 mg/dL (1+)                   No results found for: \"UTPCR\"    Imaging Results (Last 24 Hours)       ** No results found for the last 24 hours. **                Assessment / Plan     ASSESSMENT:  GORGE: Presented with sCr 6.84 on baseline ~1.2.  On initial presentation she was noted to be slightly hyponatremic and does have significantly low bicarb but electrolyte appears to be stable.  Likely pre-renal due to volume loss, hypovolemia as patient has been vomiting and having diarrhea.  It appears she was volume depleted and taking nonsteroidals likely contributing to significant worsening of renal function.  UA done initially did not show any significant casts.  CT AP without any signs of obstruction.   Gastroenteritis: Likely viral etiology. Supportive care.  Productive cough: Although WBC normal and afebrile, does have elevated procal and was placed on ABX as precaution.  Transaminitis: Etiology unclear. Hepatitis panel sent. Abdominal tenderness not near liver or gallbladder. Liver enzymes have significantly improved since presentation.  DM II: continue the sliding scale insulin.      PLAN:    Renal function appears to be slightly better not sure if this is all dilutional or there is true improvement in her renal function at this point. "  If no significant improvement may have to consider dialysis, patient and family does understand will have to take day by day.  I will go ahead and give her 1 dose of Lasix 80 mg IV, will keep her n.p.o. after midnight if there is any worsening of renal function we will go ahead and get a tunneled dialysis catheter placement and initiate dialysis.  Dr. French aware of the consult and will schedule her for catheter placement tomorrow.  Strict intake and output. Avoid known nephrotoxic meds and IV contrast exposure if able.  Serum bicarb has normalized continue supplementation.  Details were discussed with the patient as well as family in the room.    Details were also discussed with the hospitalist service and or other providers as needed.   Continue with rest of the current treatment plan, and monitor with surveillance labs.  Further recommendations will depend on clinical course of the patient during the current hospitalization.   I have reviewed the copied text to this note, it was edited and the changes made as needed.  It is accurate to the point, when the note was signed today.     Thank you for involving us in the care of Latonya Mei.  Please feel free to call with any questions.    Jamey Car MD, FASN  12/12/24  06:41 [facility]    [facility]  [phone]      Part of this note may be an electronic transcription/translation of spoken language to printed text using the Dragon Dictation System.

## 2024-12-12 NOTE — PLAN OF CARE
Goal Outcome Evaluation:  Plan of Care Reviewed With: (P) patient        Progress: (P) improving  Outcome Evaluation: (P) Pt paricipated well in PT tx. She presents supine in bed, AOx4, c/o 3/10 LBP. Today she was able to move sup>sitting EOB, tf STS, and amb 60' CGA with a RW. pt performed B LE exercises including glute sets, quad sets, and ankle pumps, please see chart for details. PT will continue POC to pt tolerance.

## 2024-12-12 NOTE — PLAN OF CARE
Goal Outcome Evaluation:            No c\o pain or discomfort noted att. Pt resting during the shift. No acute distress noted. Will continue plan of care.

## 2024-12-13 LAB
ALBUMIN SERPL-MCNC: 3.3 G/DL (ref 3.5–5.2)
ALBUMIN/GLOB SERPL: 1.2 G/DL
ALP SERPL-CCNC: 85 U/L (ref 39–117)
ALT SERPL W P-5'-P-CCNC: 65 U/L (ref 1–33)
ANION GAP SERPL CALCULATED.3IONS-SCNC: 12.3 MMOL/L (ref 5–15)
AST SERPL-CCNC: 18 U/L (ref 1–32)
BACTERIA SPEC AEROBE CULT: NORMAL
BACTERIA SPEC AEROBE CULT: NORMAL
BASOPHILS # BLD AUTO: 0.04 10*3/MM3 (ref 0–0.2)
BASOPHILS NFR BLD AUTO: 0.7 % (ref 0–1.5)
BILIRUB SERPL-MCNC: 0.2 MG/DL (ref 0–1.2)
BUN SERPL-MCNC: 36 MG/DL (ref 8–23)
BUN/CREAT SERPL: 7.2 (ref 7–25)
CALCIUM SPEC-SCNC: 8.7 MG/DL (ref 8.6–10.5)
CHLORIDE SERPL-SCNC: 102 MMOL/L (ref 98–107)
CO2 SERPL-SCNC: 27.7 MMOL/L (ref 22–29)
CREAT SERPL-MCNC: 5.03 MG/DL (ref 0.57–1)
DEPRECATED RDW RBC AUTO: 44 FL (ref 37–54)
EGFRCR SERPLBLD CKD-EPI 2021: 8.4 ML/MIN/1.73
EOSINOPHIL # BLD AUTO: 0.32 10*3/MM3 (ref 0–0.4)
EOSINOPHIL NFR BLD AUTO: 5.4 % (ref 0.3–6.2)
ERYTHROCYTE [DISTWIDTH] IN BLOOD BY AUTOMATED COUNT: 13.2 % (ref 12.3–15.4)
GLOBULIN UR ELPH-MCNC: 2.7 GM/DL
GLUCOSE BLDC GLUCOMTR-MCNC: 147 MG/DL (ref 70–130)
GLUCOSE BLDC GLUCOMTR-MCNC: 170 MG/DL (ref 70–130)
GLUCOSE BLDC GLUCOMTR-MCNC: 191 MG/DL (ref 70–130)
GLUCOSE SERPL-MCNC: 122 MG/DL (ref 65–99)
HCT VFR BLD AUTO: 28.4 % (ref 34–46.6)
HGB BLD-MCNC: 9.7 G/DL (ref 12–15.9)
IMM GRANULOCYTES # BLD AUTO: 0.03 10*3/MM3 (ref 0–0.05)
IMM GRANULOCYTES NFR BLD AUTO: 0.5 % (ref 0–0.5)
IRON 24H UR-MRATE: 67 MCG/DL (ref 37–145)
IRON SATN MFR SERPL: 22 % (ref 20–50)
LYMPHOCYTES # BLD AUTO: 1.45 10*3/MM3 (ref 0.7–3.1)
LYMPHOCYTES NFR BLD AUTO: 24.6 % (ref 19.6–45.3)
MCH RBC QN AUTO: 30.6 PG (ref 26.6–33)
MCHC RBC AUTO-ENTMCNC: 34.2 G/DL (ref 31.5–35.7)
MCV RBC AUTO: 89.6 FL (ref 79–97)
MONOCYTES # BLD AUTO: 0.42 10*3/MM3 (ref 0.1–0.9)
MONOCYTES NFR BLD AUTO: 7.1 % (ref 5–12)
NEUTROPHILS NFR BLD AUTO: 3.64 10*3/MM3 (ref 1.7–7)
NEUTROPHILS NFR BLD AUTO: 61.7 % (ref 42.7–76)
NRBC BLD AUTO-RTO: 0 /100 WBC (ref 0–0.2)
PLATELET # BLD AUTO: 287 10*3/MM3 (ref 140–450)
PMV BLD AUTO: 9.6 FL (ref 6–12)
POTASSIUM SERPL-SCNC: 3.7 MMOL/L (ref 3.5–5.2)
PROT SERPL-MCNC: 6 G/DL (ref 6–8.5)
RBC # BLD AUTO: 3.17 10*6/MM3 (ref 3.77–5.28)
SODIUM SERPL-SCNC: 142 MMOL/L (ref 136–145)
TIBC SERPL-MCNC: 304 MCG/DL (ref 298–536)
TRANSFERRIN SERPL-MCNC: 204 MG/DL (ref 200–360)
WBC NRBC COR # BLD AUTO: 5.9 10*3/MM3 (ref 3.4–10.8)

## 2024-12-13 PROCEDURE — 25010000002 HEPARIN (PORCINE) PER 1000 UNITS: Performed by: INTERNAL MEDICINE

## 2024-12-13 PROCEDURE — 84466 ASSAY OF TRANSFERRIN: CPT | Performed by: INTERNAL MEDICINE

## 2024-12-13 PROCEDURE — 85025 COMPLETE CBC W/AUTO DIFF WBC: CPT | Performed by: STUDENT IN AN ORGANIZED HEALTH CARE EDUCATION/TRAINING PROGRAM

## 2024-12-13 PROCEDURE — 97116 GAIT TRAINING THERAPY: CPT

## 2024-12-13 PROCEDURE — 25010000002 FUROSEMIDE PER 20 MG: Performed by: INTERNAL MEDICINE

## 2024-12-13 PROCEDURE — 82948 REAGENT STRIP/BLOOD GLUCOSE: CPT

## 2024-12-13 PROCEDURE — 63710000001 INSULIN LISPRO (HUMAN) PER 5 UNITS: Performed by: INTERNAL MEDICINE

## 2024-12-13 PROCEDURE — 82948 REAGENT STRIP/BLOOD GLUCOSE: CPT | Performed by: INTERNAL MEDICINE

## 2024-12-13 PROCEDURE — 83540 ASSAY OF IRON: CPT | Performed by: INTERNAL MEDICINE

## 2024-12-13 PROCEDURE — 99232 SBSQ HOSP IP/OBS MODERATE 35: CPT | Performed by: STUDENT IN AN ORGANIZED HEALTH CARE EDUCATION/TRAINING PROGRAM

## 2024-12-13 PROCEDURE — 80053 COMPREHEN METABOLIC PANEL: CPT | Performed by: STUDENT IN AN ORGANIZED HEALTH CARE EDUCATION/TRAINING PROGRAM

## 2024-12-13 RX ORDER — FUROSEMIDE 10 MG/ML
80 INJECTION INTRAMUSCULAR; INTRAVENOUS ONCE
Status: COMPLETED | OUTPATIENT
Start: 2024-12-13 | End: 2024-12-13

## 2024-12-13 RX ORDER — SODIUM BICARBONATE 650 MG/1
1300 TABLET ORAL 2 TIMES DAILY
Status: DISCONTINUED | OUTPATIENT
Start: 2024-12-13 | End: 2024-12-14

## 2024-12-13 RX ORDER — SPIRONOLACTONE 25 MG/1
50 TABLET ORAL ONCE
Status: COMPLETED | OUTPATIENT
Start: 2024-12-13 | End: 2024-12-13

## 2024-12-13 RX ADMIN — ACETAMINOPHEN 650 MG: 325 TABLET, FILM COATED ORAL at 22:40

## 2024-12-13 RX ADMIN — SODIUM BICARBONATE 650 MG TABLET 1300 MG: at 22:40

## 2024-12-13 RX ADMIN — HEPARIN SODIUM 5000 UNITS: 5000 INJECTION INTRAVENOUS; SUBCUTANEOUS at 09:03

## 2024-12-13 RX ADMIN — ASPIRIN 81 MG CHEWABLE TABLET 81 MG: 81 TABLET CHEWABLE at 09:03

## 2024-12-13 RX ADMIN — Medication 1 CAPSULE: at 09:03

## 2024-12-13 RX ADMIN — INSULIN LISPRO 2 UNITS: 100 INJECTION, SOLUTION INTRAVENOUS; SUBCUTANEOUS at 11:11

## 2024-12-13 RX ADMIN — SODIUM BICARBONATE 650 MG TABLET 1300 MG: at 09:03

## 2024-12-13 RX ADMIN — INSULIN LISPRO 2 UNITS: 100 INJECTION, SOLUTION INTRAVENOUS; SUBCUTANEOUS at 22:56

## 2024-12-13 RX ADMIN — Medication 10 ML: at 22:42

## 2024-12-13 RX ADMIN — FUROSEMIDE 80 MG: 10 INJECTION, SOLUTION INTRAMUSCULAR; INTRAVENOUS at 11:11

## 2024-12-13 RX ADMIN — VENLAFAXINE HYDROCHLORIDE 37.5 MG: 37.5 CAPSULE, EXTENDED RELEASE ORAL at 09:02

## 2024-12-13 RX ADMIN — Medication 10 ML: at 09:03

## 2024-12-13 RX ADMIN — Medication 1 CAPSULE: at 22:40

## 2024-12-13 RX ADMIN — HEPARIN SODIUM 5000 UNITS: 5000 INJECTION INTRAVENOUS; SUBCUTANEOUS at 22:40

## 2024-12-13 RX ADMIN — SPIRONOLACTONE 50 MG: 25 TABLET, FILM COATED ORAL at 11:11

## 2024-12-13 RX ADMIN — AMLODIPINE BESYLATE 10 MG: 5 TABLET ORAL at 09:02

## 2024-12-13 NOTE — PLAN OF CARE
Goal Outcome Evaluation:  Plan of Care Reviewed With: (P) patient        Progress: (P) improving  Outcome Evaluation: (P) Pt participated well in PT tx. She presents supine in bed, AOx4, c/o 5/10 headache but willing to do a walk with therapy. Today she moved sup > sitting EOB under supervision, tf STS SBA, and amb 140' CGA with a RW. Pt left resting comfortably in her chair, call light within reach, her family member at bedside, and nsg notified. PT will continue POC to pt tolerance.

## 2024-12-13 NOTE — PLAN OF CARE
Goal Outcome Evaluation:  Plan of Care Reviewed With: patient     VSS, Pt on RA and NSR on telemetry throughout the shift. Pt worked with PT during shift, sat up in chair during shift. FSBS monitored per orders, insulin administered per sliding scale. Discharge is pending.

## 2024-12-13 NOTE — PROGRESS NOTES
[facility] Progress Note  [facility]. [place]        Patient Name: Latonya Mei  : 1950  MRN: [id number]   LOS: 5 days    Patient Care Team:  Chrystal Buckley APRN as PCP - General (Nurse Practitioner)  Pao John MD as Consulting Physician (General Surgery)  Glen Ríos MD as Consulting Physician (Cardiology)  Nia García APRN as Nurse Practitioner (Gastroenterology)    Chief Complaint:    Chief Complaint   Patient presents with    Pain With Breathing    Cough     Primary Care Physician:  Chrystal Buckley APRN  Date of admission: 2024    Subjective     Interval History:   Follow-up acute kidney injury.  She is lying in the bed awake alert and interactive.  She does complain of intermittent shortness of breath but denies any chest pain.  She responded well to diuretics yesterday with over 5L UOP. Volume status seems improved.   Events noted from last 24 hours.  I reviewed the chart and other providers notes, labs and procedures done since my last note.    Review of Systems:   As noted above.    Objective     Vitals:   Temp:  [98 °F (36.7 °C)-98.3 °F (36.8 °C)] 98.3 °F (36.8 °C)  Heart Rate:  [61-70] 70  Resp:  [16-18] 18  BP: (142-192)/(63-81) 164/79    Intake/Output Summary (Last 24 hours) at 2024 0753  Last data filed at 2024 0328  Gross per 24 hour   Intake 720 ml   Output 5400 ml   Net -4680 ml       Physical Exam:    Constitutional: Awake, alert  Eyes: sclerae anicteric, no conjunctival injection  HEENT: mucous membranes dry  Neck: Supple, no thyromegaly, no lymphadenopathy, trachea midline, No JVD  Respiratory: Crackles noted half of the chest today.  Cardiovascular: RRR, no murmurs, rubs, or gallops.  Gastrointestinal: Positive bowel sounds, obese, soft, + tender around epigastric area on palpation  Musculoskeletal: 1+ edema, no clubbing or cyanosis  Psychiatric: Appropriate affect, cooperative  Neurologic: Oriented x 3,  moving all extremities, Cranial Nerves grossly intact, speech clear  Skin: warm and dry, no rashes     Scheduled Meds:     Current Facility-Administered Medications   Medication Dose Route Frequency Provider Last Rate Last Admin    acetaminophen (TYLENOL) tablet 650 mg  650 mg Oral Q4H PRN Juan Lovelace MD   650 mg at 12/12/24 0640    Or    acetaminophen (TYLENOL) 160 MG/5ML oral solution 650 mg  650 mg Oral Q4H PRN Juan Lovelace MD        Or    acetaminophen (TYLENOL) suppository 650 mg  650 mg Rectal Q4H PRN Juan Lovelace MD        amLODIPine (NORVASC) tablet 10 mg  10 mg Oral Q24H Jamey Car MD, FASHEDY        aspirin chewable tablet 81 mg  81 mg Oral Daily Juan Lovelace MD   81 mg at 12/12/24 0820    sennosides-docusate (PERICOLACE) 8.6-50 MG per tablet 2 tablet  2 tablet Oral BID PRN Juan Lovelace MD   2 tablet at 12/10/24 0935    And    polyethylene glycol (MIRALAX) packet 17 g  17 g Oral Daily PRN Juan Lovelace MD        And    bisacodyl (DULCOLAX) EC tablet 5 mg  5 mg Oral Daily PRN Juan Lovelace MD        And    bisacodyl (DULCOLAX) suppository 10 mg  10 mg Rectal Daily PRN Juan Lovelace MD        dextrose (D50W) (25 g/50 mL) IV injection 25 g  25 g Intravenous Q15 Min PRN Juan Lovelace MD        dextrose (GLUTOSE) oral gel 15 g  15 g Oral Q15 Min PRN Juan Lovelace MD        glucagon (GLUCAGEN) injection 1 mg  1 mg Intramuscular Q15 Min PRN Juan Lovelace MD        heparin (porcine) 5000 UNIT/ML injection 5,000 Units  5,000 Units Subcutaneous Q12H Juan Lovelace MD   5,000 Units at 12/12/24 2048    hydrALAZINE (APRESOLINE) injection 10 mg  10 mg Intravenous Q4H PRN Kerley, Brian Joseph, DO   10 mg at 12/12/24 1435    HYDROcodone-acetaminophen (NORCO) 7.5-325 MG per tablet 1 tablet  1 tablet Oral Q6H PRN Juan Lovelace MD   1 tablet at 12/12/24 3066    Insulin Lispro (humaLOG) injection 2-7 Units  2-7 Units Subcutaneous 4x Daily AC & at Bedtime Juan Lovelace MD   2 Units at 12/12/24 2048    lactobacillus  acidophilus (RISAQUAD) capsule 1 capsule  1 capsule Oral BID Juan Lovelace MD   1 capsule at 12/12/24 2048    levothyroxine (SYNTHROID, LEVOTHROID) tablet 25 mcg  25 mcg Oral Daily Juan Lovelace MD   25 mcg at 12/12/24 0630    ondansetron (ZOFRAN) injection 4 mg  4 mg Intravenous Q6H PRN Juan Lovelace MD   4 mg at 12/12/24 1658    pantoprazole (PROTONIX) EC tablet 40 mg  40 mg Oral Q AM Juan Lovelace MD   40 mg at 12/12/24 0630    promethazine (PHENERGAN) tablet 12.5 mg  12.5 mg Oral Q8H PRN Juan Lovelace MD        sodium bicarbonate tablet 1,300 mg  1,300 mg Oral 4x Daily Jamey Car MD, FASN   1,300 mg at 12/12/24 2048    sodium chloride 0.9 % flush 10 mL  10 mL Intravenous Q12H Juan Lovelace MD   10 mL at 12/12/24 2355    sodium chloride 0.9 % flush 10 mL  10 mL Intravenous PRN Juan Lovelace MD        sodium chloride 0.9 % infusion 40 mL  40 mL Intravenous PRN Juan Lovelace MD        venlafaxine XR (EFFEXOR-XR) 24 hr capsule 37.5 mg  37.5 mg Oral Daily Juan Lovelace MD   37.5 mg at 12/12/24 0820       amLODIPine, 10 mg, Oral, Q24H  aspirin, 81 mg, Oral, Daily  heparin (porcine), 5,000 Units, Subcutaneous, Q12H  insulin lispro, 2-7 Units, Subcutaneous, 4x Daily AC & at Bedtime  lactobacillus acidophilus, 1 capsule, Oral, BID  levothyroxine, 25 mcg, Oral, Daily  pantoprazole, 40 mg, Oral, Q AM  sodium bicarbonate, 1,300 mg, Oral, 4x Daily  sodium chloride, 10 mL, Intravenous, Q12H  venlafaxine XR, 37.5 mg, Oral, Daily        IV Meds:          Results Reviewed:   I have personally reviewed the results from the time of this admission to 12/13/2024 07:53 EST     Results from last 7 days   Lab Units 12/13/24  0616 12/12/24  0643 12/11/24  0726   SODIUM mmol/L 142 142 141   POTASSIUM mmol/L 3.7 4.5 4.4   CHLORIDE mmol/L 102 105 109*   CO2 mmol/L 27.7 23.7 20.4*   BUN mg/dL 36* 40* 45*   CREATININE mg/dL 5.03* 5.71* 5.79*   CALCIUM mg/dL 8.7 8.7 8.5*   BILIRUBIN mg/dL 0.2 0.2 0.2   ALK PHOS U/L 85 89 99   ALT (SGPT)  "U/L 65* 85* 113*   AST (SGOT) U/L 18 22 26   GLUCOSE mg/dL 122* 116* 120*       Estimated Creatinine Clearance: 10 mL/min (A) (by C-G formula based on SCr of 5.03 mg/dL (H)).                Results from last 7 days   Lab Units 12/13/24  0616 12/12/24  0643 12/11/24  0726 12/10/24  0633 12/09/24  0539   WBC 10*3/mm3 5.90 7.18 6.65 6.27 4.84   HEMOGLOBIN g/dL 9.7* 9.4* 9.4* 9.6* 9.1*   PLATELETS 10*3/mm3 287 262 228 202 186             Brief Urine Lab Results  (Last result in the past 365 days)        Color   Clarity   Blood   Leuk Est   Nitrite   Protein   CREAT   Urine HCG        12/08/24 1334 Yellow   Cloudy   Small (1+)   Negative   Negative   30 mg/dL (1+)                   No results found for: \"UTPCR\"    Imaging Results (Last 24 Hours)       ** No results found for the last 24 hours. **                Assessment / Plan     ASSESSMENT:  GORGE: Presented with sCr 6.84 on baseline ~1.2.  On initial presentation she was noted to be slightly hyponatremic and does have significantly low bicarb but electrolyte appears to be stable.  Likely pre-renal due to volume loss, hypovolemia as patient has been vomiting and having diarrhea.  It appears she was volume depleted and taking nonsteroidals likely contributing to significant worsening of renal function.  UA done initially did not show any significant casts.  CT AP without any signs of obstruction.   Gastroenteritis: Likely viral etiology. Supportive care.  Productive cough: Although WBC normal and afebrile, does have elevated procal and was placed on ABX as precaution.  Transaminitis: Etiology unclear. Hepatitis panel negative. Abdominal tenderness not near liver or gallbladder. Liver enzymes have significantly improved since presentation.  DM II: continue the sliding scale insulin.      PLAN:    Renal function appears to be slightly better this morning. If no significant improvement may have to consider dialysis, patient and family does understand will have to take day " by day.  She responded well to the one time dose of IV lasix yesterday. Due to trend of some renal improvement, will continue to hold off on any dialysis for now. However, if there is any worsening of renal function we will plan on getting tunneled dialysis catheter placement and initiating dialysis.  She is still appears to have some crackles and edema we will give another dose of diuretic today.  Strict intake and output. Avoid known nephrotoxic meds and IV contrast exposure if able.  Serum bicarb has normalized continue supplementation.  I will go ahead and decrease the dose to twice a day.  Remove Gross catheter.  Details were discussed with the patient as well as family in the room.    Details were also discussed with the hospitalist service and or other providers as needed.   Continue with rest of the current treatment plan, and monitor with surveillance labs.  Further recommendations will depend on clinical course of the patient during the current hospitalization.   I have reviewed the copied text to this note, it was edited and the changes made as needed.  It is accurate to the point, when the note was signed today.     Thank you for involving us in the care of Latonya Mei.  Please feel free to call with any questions.    Gurpreet Longo, APRN  12/13/24  07:53 EST    [facility]  [phone]      Part of this note may be an electronic transcription/translation of spoken language to printed text using the Dragon Dictation System.

## 2024-12-13 NOTE — PROGRESS NOTES
[facility]    Name:  Latonya Mei   Age:  74 y.o.  Sex:  female  :  1950  MRN:  [id number]   Visit Number:  [id number]  Admission Date:  2024  Date Of Service:  24  Primary Care Physician:  Chrystal Buckley APRN     LOS: 5 days :    Chief Complaint:      Follow-up; renal failure    Subjective:    Feels ok today.  Still having cough but no chest pain.    Hospital Course:    Latonya Mei is a 74-year-old female with history of type 2 diabetes, hypothyroidism, GERD, fibromyalgia was brought to the emergency room by her daughter with multiple symptoms including chills, abdominal discomfort, nausea, vomiting, cough and shortness of breath.  Patient states that she has had chronic left knee joint pain due to torn meniscus.  She has been following up with Dr. Vazquez and apparently has been scheduled for surgery.  She has been taking 2 tablets of Motrin/Tylenol as needed for the last 3 weeks.  4 days ago, she started having epigastric pain nausea, vomiting, chills and low-grade fevers.  She called her primary care provider and apparently was seen by her and urine was sent.  She was placed on ciprofloxacin.  Patient however has not had any relief of symptoms and came to the emergency room.     Patient was last admitted to this facility in 2024 with similar complaints and at that time she was noted to have mild GORGE.  Due to shortness of breath, she was seen by cardiology and did undergo cardiac catheterization which was negative for any significant coronary artery disease at that time.     In the emergency room, she was afebrile and hemodynamically stable saturating at 97% on room air.  Initial CK was within normal range.  Troponin 21, proBNP 4000, sodium 132, CO2 19, anion gap 19, BUN 52, creatinine 6.84 (baseline 1.2), glucose 168, alkaline phosphatase 147, , .  Lactic acid was within normal range but procalcitonin is elevated at  4.83.  CBC was unremarkable except for a hemoglobin of 11 (baseline).  Monospot test was negative.  Urine analysis shows 3-5 RBCs but otherwise negative for any evidence of urinary tract infection.  Respiratory panel was negative.  Chest x-ray showed no acute process.  CT of the abdomen and pelvis without contrast was negative for any hydronephrosis or nephrolithiasis.  It showed diverticulosis, benign left renal cyst.  EKG was unremarkable.  Patient was given 2 L of normal saline bolus, Zofran and Zosyn in the emergency room.  Blood cultures were done and acute hepatitis panel was sent.     Review of Systems:     All systems were reviewed and negative except as mentioned in subjective, assessment and plan.    Vital Signs:    Temp:  [98 °F (36.7 °C)-98.3 °F (36.8 °C)] 98.3 °F (36.8 °C)  Heart Rate:  [61-70] 70  Resp:  [16-18] 18  BP: (142-192)/(63-81) 164/79    Intake and output:    I/O last 3 completed shifts:  In: 720 [P.O.:720]  Out: 6900 [Urine:6900]  I/O this shift:  In: -   Out: 1000 [Urine:1000]    Physical Examination:    General Appearance:  Alert and cooperative.  Comfortable in bed.   Head:  Atraumatic and normocephalic.   Eyes: Conjunctivae and sclerae normal, no icterus. No pallor.   Throat: No oral lesions, no thrush, oral mucosa moist.   Neck: Supple, trachea midline, no thyromegaly.   Lungs:   Breath sounds heard bilaterally equally.  No wheezing or crackles. No Pleural rub or bronchial breathing.   Heart:  Normal S1 and S2, no murmur, no gallop, no rub. No JVD.   Abdomen:   Normal bowel sounds, no masses, no organomegaly. Soft, mild nonspecific tenderness, nondistended, no rebound tenderness.  Obese.   Extremities: Supple, bilateral lower extremity pitting edema, no cyanosis, no clubbing.   Skin: Warm.   Neurologic: Alert and oriented x 3. No facial asymmetry. Moves all four limbs. No tremors.      Laboratory results:    Results from last 7 days   Lab Units 12/13/24  0616 12/12/24  0643  "12/11/24  0726   SODIUM mmol/L 142 142 141   POTASSIUM mmol/L 3.7 4.5 4.4   CHLORIDE mmol/L 102 105 109*   CO2 mmol/L 27.7 23.7 20.4*   BUN mg/dL 36* 40* 45*   CREATININE mg/dL 5.03* 5.71* 5.79*   CALCIUM mg/dL 8.7 8.7 8.5*   BILIRUBIN mg/dL 0.2 0.2 0.2   ALK PHOS U/L 85 89 99   ALT (SGPT) U/L 65* 85* 113*   AST (SGOT) U/L 18 22 26   GLUCOSE mg/dL 122* 116* 120*     Results from last 7 days   Lab Units 12/13/24  0616 12/12/24  0643 12/11/24  0726   WBC 10*3/mm3 5.90 7.18 6.65   HEMOGLOBIN g/dL 9.7* 9.4* 9.4*   HEMATOCRIT % 28.4* 28.3* 29.1*   PLATELETS 10*3/mm3 287 262 228         Results from last 7 days   Lab Units 12/08/24  1532 12/08/24  1333   CK TOTAL U/L  --  113   HSTROP T ng/L 18* 21*     Results from last 7 days   Lab Units 12/08/24  1515 12/08/24  1500   BLOODCX  No growth at 4 days No growth at 4 days     No results for input(s): \"PHART\", \"ODO5JQK\", \"PO2ART\", \"NIV9PRF\", \"BASEEXCESS\" in the last 8760 hours.   I have reviewed the patient's laboratory results.    Radiology results:    No radiology results from the last 24 hrs  I have reviewed the patient's radiology reports.    Medication Review:     I have reviewed the patient's active and prn medications.     Problem List:      GORGE (acute kidney injury)    Type 2 diabetes mellitus without complication, without long-term current use of insulin    Gastroesophageal reflux disease      Assessment/Plan:    Inpatient general floor admission 12/8/2024 with acute renal failure.  Due to elevated procalcitonin and cough, she was started on Rocephin empirically.    GORGE  Nephrology consultation, recommendations appreciated.  Discontinued IVF.  Gross catheter.  Will consider to monitor to see if she needs dialysis during this hospitalization.  Likely secondary to NSAIDs and dehydration.  Strongly advised to discontinue NSAIDs.  CT of the abdomen negative for any hydronephrosis.  Constipation  Bowel regimen.  Hypertension  Amlodipine.  Hydralazine as needed.  Likely " due to renal status and fluid overload.  Discontinued IVF per recommendation nephrology.  She will likely need dialysis.  Cough  Elevated procalcitonin  Rocephin empirically.  Blood cultures negative.  Transaminitis  Monitoring.  Possibly secondary to dehydration.  Viral hepatitis panel negative.  No specific right upper quadrant tenderness.    Chronic: type 2 diabetes, hypothyroidism, GERD, fibromyalgia  Subcutaneous insulin protocol.  Continue other home medications.    DVT Prophylaxis: Heparin  Code Status: Full code  Diet: Diabetic  Discharge Plan: Expecting prolonged course due to likely requiring dialysis initiation    Updated daughter Dorene during course.    Brian Joseph Kerley,   12/13/24  10:42 EST    Dictated utilizing Dragon dictation.    I have reviewed the copied text and it is accurate as of 12/13/2024
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
## 2024-12-13 NOTE — PLAN OF CARE
Goal Outcome Evaluation:         Pt resting during shift. NPO since midnight. Pain effectively managed with PRN medications. No c/o nausea noted. Will continue plan of care.

## 2024-12-13 NOTE — CASE MANAGEMENT/SOCIAL WORK
Met with pt, IMM obtained. Pt was npo for possible dialysis tunnel cath placement; her kidney function has improved, so plan now is to monitor. She does plan to return home with her spouse, daughter is supportive.

## 2024-12-14 LAB
ALBUMIN SERPL-MCNC: 3.8 G/DL (ref 3.5–5.2)
ALBUMIN/GLOB SERPL: 1.3 G/DL
ALP SERPL-CCNC: 91 U/L (ref 39–117)
ALT SERPL W P-5'-P-CCNC: 56 U/L (ref 1–33)
ANION GAP SERPL CALCULATED.3IONS-SCNC: 11.5 MMOL/L (ref 5–15)
AST SERPL-CCNC: 19 U/L (ref 1–32)
BASOPHILS # BLD AUTO: 0.05 10*3/MM3 (ref 0–0.2)
BASOPHILS NFR BLD AUTO: 0.8 % (ref 0–1.5)
BILIRUB SERPL-MCNC: 0.3 MG/DL (ref 0–1.2)
BUN SERPL-MCNC: 37 MG/DL (ref 8–23)
BUN/CREAT SERPL: 8 (ref 7–25)
CALCIUM SPEC-SCNC: 9.2 MG/DL (ref 8.6–10.5)
CHLORIDE SERPL-SCNC: 98 MMOL/L (ref 98–107)
CO2 SERPL-SCNC: 32.5 MMOL/L (ref 22–29)
CREAT SERPL-MCNC: 4.62 MG/DL (ref 0.57–1)
DEPRECATED RDW RBC AUTO: 45 FL (ref 37–54)
EGFRCR SERPLBLD CKD-EPI 2021: 9.3 ML/MIN/1.73
EOSINOPHIL # BLD AUTO: 0.3 10*3/MM3 (ref 0–0.4)
EOSINOPHIL NFR BLD AUTO: 4.8 % (ref 0.3–6.2)
ERYTHROCYTE [DISTWIDTH] IN BLOOD BY AUTOMATED COUNT: 13.3 % (ref 12.3–15.4)
GLOBULIN UR ELPH-MCNC: 3 GM/DL
GLUCOSE BLDC GLUCOMTR-MCNC: 118 MG/DL (ref 70–130)
GLUCOSE BLDC GLUCOMTR-MCNC: 130 MG/DL (ref 70–130)
GLUCOSE BLDC GLUCOMTR-MCNC: 131 MG/DL (ref 70–130)
GLUCOSE BLDC GLUCOMTR-MCNC: 155 MG/DL (ref 70–130)
GLUCOSE BLDC GLUCOMTR-MCNC: 226 MG/DL (ref 70–130)
GLUCOSE SERPL-MCNC: 134 MG/DL (ref 65–99)
HCT VFR BLD AUTO: 31.7 % (ref 34–46.6)
HGB BLD-MCNC: 10.6 G/DL (ref 12–15.9)
IMM GRANULOCYTES # BLD AUTO: 0.03 10*3/MM3 (ref 0–0.05)
IMM GRANULOCYTES NFR BLD AUTO: 0.5 % (ref 0–0.5)
LYMPHOCYTES # BLD AUTO: 1.63 10*3/MM3 (ref 0.7–3.1)
LYMPHOCYTES NFR BLD AUTO: 26.2 % (ref 19.6–45.3)
MCH RBC QN AUTO: 30.5 PG (ref 26.6–33)
MCHC RBC AUTO-ENTMCNC: 33.4 G/DL (ref 31.5–35.7)
MCV RBC AUTO: 91.4 FL (ref 79–97)
MONOCYTES # BLD AUTO: 0.36 10*3/MM3 (ref 0.1–0.9)
MONOCYTES NFR BLD AUTO: 5.8 % (ref 5–12)
NEUTROPHILS NFR BLD AUTO: 3.86 10*3/MM3 (ref 1.7–7)
NEUTROPHILS NFR BLD AUTO: 61.9 % (ref 42.7–76)
NRBC BLD AUTO-RTO: 0 /100 WBC (ref 0–0.2)
PLATELET # BLD AUTO: 357 10*3/MM3 (ref 140–450)
PMV BLD AUTO: 9.7 FL (ref 6–12)
POTASSIUM SERPL-SCNC: 3.5 MMOL/L (ref 3.5–5.2)
PROT SERPL-MCNC: 6.8 G/DL (ref 6–8.5)
RBC # BLD AUTO: 3.47 10*6/MM3 (ref 3.77–5.28)
SODIUM SERPL-SCNC: 142 MMOL/L (ref 136–145)
WBC NRBC COR # BLD AUTO: 6.23 10*3/MM3 (ref 3.4–10.8)

## 2024-12-14 PROCEDURE — 25010000002 HEPARIN (PORCINE) PER 1000 UNITS: Performed by: INTERNAL MEDICINE

## 2024-12-14 PROCEDURE — 97110 THERAPEUTIC EXERCISES: CPT

## 2024-12-14 PROCEDURE — 80053 COMPREHEN METABOLIC PANEL: CPT | Performed by: STUDENT IN AN ORGANIZED HEALTH CARE EDUCATION/TRAINING PROGRAM

## 2024-12-14 PROCEDURE — 97530 THERAPEUTIC ACTIVITIES: CPT

## 2024-12-14 PROCEDURE — 63710000001 INSULIN LISPRO (HUMAN) PER 5 UNITS: Performed by: INTERNAL MEDICINE

## 2024-12-14 PROCEDURE — 99232 SBSQ HOSP IP/OBS MODERATE 35: CPT | Performed by: STUDENT IN AN ORGANIZED HEALTH CARE EDUCATION/TRAINING PROGRAM

## 2024-12-14 PROCEDURE — 82948 REAGENT STRIP/BLOOD GLUCOSE: CPT

## 2024-12-14 PROCEDURE — 82948 REAGENT STRIP/BLOOD GLUCOSE: CPT | Performed by: INTERNAL MEDICINE

## 2024-12-14 PROCEDURE — 85025 COMPLETE CBC W/AUTO DIFF WBC: CPT | Performed by: STUDENT IN AN ORGANIZED HEALTH CARE EDUCATION/TRAINING PROGRAM

## 2024-12-14 PROCEDURE — 97116 GAIT TRAINING THERAPY: CPT

## 2024-12-14 RX ORDER — SPIRONOLACTONE 25 MG/1
25 TABLET ORAL DAILY
Status: DISCONTINUED | OUTPATIENT
Start: 2024-12-14 | End: 2024-12-15 | Stop reason: HOSPADM

## 2024-12-14 RX ORDER — FUROSEMIDE 40 MG/1
40 TABLET ORAL DAILY
Status: DISCONTINUED | OUTPATIENT
Start: 2024-12-14 | End: 2024-12-15 | Stop reason: HOSPADM

## 2024-12-14 RX ADMIN — SODIUM BICARBONATE 650 MG TABLET 1300 MG: at 09:40

## 2024-12-14 RX ADMIN — LEVOTHYROXINE SODIUM 25 MCG: 0.03 TABLET ORAL at 06:38

## 2024-12-14 RX ADMIN — Medication 1 CAPSULE: at 20:54

## 2024-12-14 RX ADMIN — SPIRONOLACTONE 25 MG: 25 TABLET, FILM COATED ORAL at 13:37

## 2024-12-14 RX ADMIN — FUROSEMIDE 40 MG: 40 TABLET ORAL at 13:37

## 2024-12-14 RX ADMIN — Medication 10 ML: at 20:54

## 2024-12-14 RX ADMIN — Medication 10 ML: at 09:41

## 2024-12-14 RX ADMIN — VENLAFAXINE HYDROCHLORIDE 37.5 MG: 37.5 CAPSULE, EXTENDED RELEASE ORAL at 09:40

## 2024-12-14 RX ADMIN — AMLODIPINE BESYLATE 10 MG: 5 TABLET ORAL at 09:40

## 2024-12-14 RX ADMIN — ASPIRIN 81 MG CHEWABLE TABLET 81 MG: 81 TABLET CHEWABLE at 09:40

## 2024-12-14 RX ADMIN — HEPARIN SODIUM 5000 UNITS: 5000 INJECTION INTRAVENOUS; SUBCUTANEOUS at 20:54

## 2024-12-14 RX ADMIN — PANTOPRAZOLE SODIUM 40 MG: 40 TABLET, DELAYED RELEASE ORAL at 05:27

## 2024-12-14 RX ADMIN — INSULIN LISPRO 3 UNITS: 100 INJECTION, SOLUTION INTRAVENOUS; SUBCUTANEOUS at 20:53

## 2024-12-14 RX ADMIN — INSULIN LISPRO 2 UNITS: 100 INJECTION, SOLUTION INTRAVENOUS; SUBCUTANEOUS at 11:41

## 2024-12-14 RX ADMIN — Medication 1 CAPSULE: at 09:40

## 2024-12-14 RX ADMIN — HEPARIN SODIUM 5000 UNITS: 5000 INJECTION INTRAVENOUS; SUBCUTANEOUS at 09:40

## 2024-12-14 RX ADMIN — Medication 5 MG: at 23:30

## 2024-12-14 NOTE — PROGRESS NOTES
[facility]    [facility]    Name:  Latonya Mei   Age:  74 y.o.  Sex:  female  :  1950  MRN:  [id number]   Visit Number:  [id number]  Admission Date:  2024  Date Of Service:  24  Primary Care Physician:  Chrystal Buckley APRN     LOS: 6 days :    Chief Complaint:      Follow-up; renal failure    Subjective:    Feels ok today.  Still having cough but no chest pain.    Hospital Course:    Latonya Mei is a 74-year-old female with history of type 2 diabetes, hypothyroidism, GERD, fibromyalgia was brought to the emergency room by her daughter with multiple symptoms including chills, abdominal discomfort, nausea, vomiting, cough and shortness of breath.  Patient states that she has had chronic left knee joint pain due to torn meniscus.  She has been following up with Dr. Vazquez and apparently has been scheduled for surgery.  She has been taking 2 tablets of Motrin/Tylenol as needed for the last 3 weeks.  4 days ago, she started having epigastric pain nausea, vomiting, chills and low-grade fevers.  She called her primary care provider and apparently was seen by her and urine was sent.  She was placed on ciprofloxacin.  Patient however has not had any relief of symptoms and came to the emergency room.     Patient was last admitted to this facility in 2024 with similar complaints and at that time she was noted to have mild GORGE.  Due to shortness of breath, she was seen by cardiology and did undergo cardiac catheterization which was negative for any significant coronary artery disease at that time.     In the emergency room, she was afebrile and hemodynamically stable saturating at 97% on room air.  Initial CK was within normal range.  Troponin 21, proBNP 4000, sodium 132, CO2 19, anion gap 19, BUN 52, creatinine 6.84 (baseline 1.2), glucose 168, alkaline phosphatase 147, , .  Lactic acid was within normal range but procalcitonin is elevated at  4.83.  CBC was unremarkable except for a hemoglobin of 11 (baseline).  Monospot test was negative.  Urine analysis shows 3-5 RBCs but otherwise negative for any evidence of urinary tract infection.  Respiratory panel was negative.  Chest x-ray showed no acute process.  CT of the abdomen and pelvis without contrast was negative for any hydronephrosis or nephrolithiasis.  It showed diverticulosis, benign left renal cyst.  EKG was unremarkable.  Patient was given 2 L of normal saline bolus, Zofran and Zosyn in the emergency room.  Blood cultures were done and acute hepatitis panel was sent.     Review of Systems:     All systems were reviewed and negative except as mentioned in subjective, assessment and plan.    Vital Signs:    Temp:  [98.3 °F (36.8 °C)-99.1 °F (37.3 °C)] 98.4 °F (36.9 °C)  Heart Rate:  [65-77] 77  Resp:  [16-18] 18  BP: (126-156)/(61-75) 143/66    Intake and output:    I/O last 3 completed shifts:  In: 960 [P.O.:960]  Out: 6050 [Urine:6050]  I/O this shift:  In: 720 [P.O.:720]  Out: 1200 [Urine:1200]    Physical Examination:    General Appearance:  Alert and cooperative.  Comfortable in bed.   Head:  Atraumatic and normocephalic.   Eyes: Conjunctivae and sclerae normal, no icterus. No pallor.   Throat: No oral lesions, no thrush, oral mucosa moist.   Neck: Supple, trachea midline, no thyromegaly.   Lungs:   Breath sounds heard bilaterally equally.  No wheezing or crackles. No Pleural rub or bronchial breathing.   Heart:  Normal S1 and S2, no murmur, no gallop, no rub. No JVD.   Abdomen:   Normal bowel sounds, no masses, no organomegaly. Soft, mild nonspecific tenderness, nondistended, no rebound tenderness.  Obese.   Extremities: Supple, bilateral lower extremity pitting edema, no cyanosis, no clubbing.   Skin: Warm.   Neurologic: Alert and oriented x 3. No facial asymmetry. Moves all four limbs. No tremors.      Laboratory results:    Results from last 7 days   Lab Units 12/14/24  5560  "12/13/24  0616 12/12/24  0643   SODIUM mmol/L 142 142 142   POTASSIUM mmol/L 3.5 3.7 4.5   CHLORIDE mmol/L 98 102 105   CO2 mmol/L 32.5* 27.7 23.7   BUN mg/dL 37* 36* 40*   CREATININE mg/dL 4.62* 5.03* 5.71*   CALCIUM mg/dL 9.2 8.7 8.7   BILIRUBIN mg/dL 0.3 0.2 0.2   ALK PHOS U/L 91 85 89   ALT (SGPT) U/L 56* 65* 85*   AST (SGOT) U/L 19 18 22   GLUCOSE mg/dL 134* 122* 116*     Results from last 7 days   Lab Units 12/14/24  0647 12/13/24  0616 12/12/24  0643   WBC 10*3/mm3 6.23 5.90 7.18   HEMOGLOBIN g/dL 10.6* 9.7* 9.4*   HEMATOCRIT % 31.7* 28.4* 28.3*   PLATELETS 10*3/mm3 357 287 262         Results from last 7 days   Lab Units 12/08/24  1532 12/08/24  1333   CK TOTAL U/L  --  113   HSTROP T ng/L 18* 21*     Results from last 7 days   Lab Units 12/08/24  1515 12/08/24  1500   BLOODCX  No growth at 5 days No growth at 5 days     No results for input(s): \"PHART\", \"BZX9LDH\", \"PO2ART\", \"SHZ5GUR\", \"BASEEXCESS\" in the last 8760 hours.   I have reviewed the patient's laboratory results.    Radiology results:    No radiology results from the last 24 hrs  I have reviewed the patient's radiology reports.    Medication Review:     I have reviewed the patient's active and prn medications.     Problem List:      GORGE (acute kidney injury)    Type 2 diabetes mellitus without complication, without long-term current use of insulin    Gastroesophageal reflux disease      Assessment/Plan:    Inpatient general floor admission 12/8/2024 with acute renal failure.  Due to elevated procalcitonin and cough, she was started on Rocephin empirically.    GORGE  Nephrology consultation, recommendations appreciated.  Discontinued IVF.  Gross catheter.  Will consider to monitor to see if she needs dialysis during this hospitalization.  Likely secondary to NSAIDs and dehydration.  Strongly advised to discontinue NSAIDs.  CT of the abdomen negative for any hydronephrosis.  Constipation  Bowel regimen.  Hypertension  Amlodipine.  Hydralazine as " needed.  Likely due to renal status and fluid overload.  Discontinued IVF per recommendation nephrology.  She will likely need dialysis.  Cough  Elevated procalcitonin  Rocephin empirically.  Blood cultures negative.  Transaminitis  Monitoring.  Possibly secondary to dehydration.  Viral hepatitis panel negative.  No specific right upper quadrant tenderness.    Chronic: type 2 diabetes, hypothyroidism, GERD, fibromyalgia  Subcutaneous insulin protocol.  Continue other home medications.    DVT Prophylaxis: Heparin  Code Status: Full code  Diet: Diabetic  Discharge Plan: Likely home as early as 12/15 depending on clinical status    Updated daughter Dorene during course.    Brian Joseph Kerley,   12/14/24  16:02 EST    Dictated utilizing Dragon dictation.    I have reviewed the copied text and it is accurate as of 12/14/2024
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
## 2024-12-14 NOTE — PLAN OF CARE
Goal Outcome Evaluation:  Plan of Care Reviewed With: patient        Progress: improving  Outcome Evaluation: Pt agreeable to physical therapy. Performed supine to sit mod I, scooting EOB mod I, sit <->stand RW mod I, amb with  feet with S/mod I with decreased velocity, without LOB or path deviations noted. Performed B LE ex in sitting AP, LAQ, hip abd, marching and reclined QS and glute sets 1x20 reps. Con't with PT POC and progress as tolerated

## 2024-12-14 NOTE — PLAN OF CARE
"Goal Outcome Evaluation:         VSS throughout shift. Pt on RA; ambulates to toilet ad rayo. C/o \"sinus headache\" provider notified PRN medication given with effective results. No acute distress noted. Will continue plan of care.                                   "

## 2024-12-14 NOTE — PROGRESS NOTES
[facility] Progress Note  [facility]. [place]        Patient Name: Latonya Mei  : 1950  MRN: [id number]   LOS: 6 days    Patient Care Team:  Chrystal Buckley APRN as PCP - General (Nurse Practitioner)  Pao John MD as Consulting Physician (General Surgery)  Glen Ríos MD as Consulting Physician (Cardiology)  Nia García APRN as Nurse Practitioner (Gastroenterology)    Chief Complaint:    Chief Complaint   Patient presents with    Pain With Breathing    Cough     Primary Care Physician:  Chrystal Buckley APRN  Date of admission: 2024    Subjective     Interval History:   Follow-up acute kidney injury.  She is lying in the bed awake alert and interactive.  She does complain of intermittent shortness of breath but denies any chest pain.  She responded well to diuretics yesterday with over 5L UOP. Volume status seems improved.   Events noted from last 24 hours.  I reviewed the chart and other providers notes, labs and procedures done since my last note.    Review of Systems:   As noted above.    Objective     Vitals:   Temp:  [97.7 °F (36.5 °C)-99.1 °F (37.3 °C)] 98.3 °F (36.8 °C)  Heart Rate:  [68-73] 73  Resp:  [16-20] 16  BP: (126-156)/(61-75) 126/61    Intake/Output Summary (Last 24 hours) at 2024 0731  Last data filed at 2024 0600  Gross per 24 hour   Intake 720 ml   Output 3250 ml   Net -2530 ml       Physical Exam:    Constitutional: Awake, alert  Eyes: sclerae anicteric, no conjunctival injection  HEENT: mucous membranes dry  Neck: Supple, no thyromegaly, no lymphadenopathy, trachea midline, No JVD  Respiratory: Crackles noted half of the chest today.  Cardiovascular: RRR, no murmurs, rubs, or gallops.  Gastrointestinal: Positive bowel sounds, obese, soft, + tender around epigastric area on palpation  Musculoskeletal: 1+ edema, no clubbing or cyanosis  Psychiatric: Appropriate affect, cooperative  Neurologic: Oriented x  3, moving all extremities, Cranial Nerves grossly intact, speech clear  Skin: warm and dry, no rashes     Scheduled Meds:     Current Facility-Administered Medications   Medication Dose Route Frequency Provider Last Rate Last Admin    acetaminophen (TYLENOL) tablet 650 mg  650 mg Oral Q4H PRN Juan Lovelace MD   650 mg at 12/13/24 2240    Or    acetaminophen (TYLENOL) 160 MG/5ML oral solution 650 mg  650 mg Oral Q4H PRN Juan Lovelace MD        Or    acetaminophen (TYLENOL) suppository 650 mg  650 mg Rectal Q4H PRN Juan Lovelace MD        amLODIPine (NORVASC) tablet 10 mg  10 mg Oral Q24H Jamey Car MD, FASN   10 mg at 12/13/24 0902    aspirin chewable tablet 81 mg  81 mg Oral Daily Juan Lovelace MD   81 mg at 12/13/24 0903    sennosides-docusate (PERICOLACE) 8.6-50 MG per tablet 2 tablet  2 tablet Oral BID PRN Juan Lovelace MD   2 tablet at 12/10/24 0935    And    polyethylene glycol (MIRALAX) packet 17 g  17 g Oral Daily PRN Juan Lovelace MD        And    bisacodyl (DULCOLAX) EC tablet 5 mg  5 mg Oral Daily PRN Juan Lovelace MD        And    bisacodyl (DULCOLAX) suppository 10 mg  10 mg Rectal Daily PRN Juan Lovelace MD        dextrose (D50W) (25 g/50 mL) IV injection 25 g  25 g Intravenous Q15 Min PRN Juan Lovelace MD        dextrose (GLUTOSE) oral gel 15 g  15 g Oral Q15 Min PRN Juan Lovelace MD        glucagon (GLUCAGEN) injection 1 mg  1 mg Intramuscular Q15 Min PRN Juan Lovelace MD        heparin (porcine) 5000 UNIT/ML injection 5,000 Units  5,000 Units Subcutaneous Q12H Juan Lovelace MD   5,000 Units at 12/13/24 2240    hydrALAZINE (APRESOLINE) injection 10 mg  10 mg Intravenous Q4H PRN Kerley, Brian Joseph, DO   10 mg at 12/12/24 1435    Insulin Lispro (humaLOG) injection 2-7 Units  2-7 Units Subcutaneous 4x Daily AC & at Bedtime Juan Lovelace MD   2 Units at 12/13/24 2178    lactobacillus acidophilus (RISAQUAD) capsule 1 capsule  1 capsule Oral BID Juan Lovelace MD   1 capsule at 12/13/24 1420     levothyroxine (SYNTHROID, LEVOTHROID) tablet 25 mcg  25 mcg Oral Daily Juan Lovelace MD   25 mcg at 12/14/24 0638    ondansetron (ZOFRAN) injection 4 mg  4 mg Intravenous Q6H PRN Juan Lovelace MD   4 mg at 12/12/24 1658    pantoprazole (PROTONIX) EC tablet 40 mg  40 mg Oral Q AM Juan Lovelace MD   40 mg at 12/14/24 0527    promethazine (PHENERGAN) tablet 12.5 mg  12.5 mg Oral Q8H PRN Juan Lovelace MD        sodium bicarbonate tablet 1,300 mg  1,300 mg Oral BID Jamey Car MD, FASN   1,300 mg at 12/13/24 2240    sodium chloride 0.9 % flush 10 mL  10 mL Intravenous Q12H Juan Lovelace MD   10 mL at 12/13/24 2242    sodium chloride 0.9 % flush 10 mL  10 mL Intravenous PRN Juan Lovelace MD        sodium chloride 0.9 % infusion 40 mL  40 mL Intravenous PRN Juan Lovelace MD        venlafaxine XR (EFFEXOR-XR) 24 hr capsule 37.5 mg  37.5 mg Oral Daily Juan Lovelace MD   37.5 mg at 12/13/24 0902       amLODIPine, 10 mg, Oral, Q24H  aspirin, 81 mg, Oral, Daily  heparin (porcine), 5,000 Units, Subcutaneous, Q12H  insulin lispro, 2-7 Units, Subcutaneous, 4x Daily AC & at Bedtime  lactobacillus acidophilus, 1 capsule, Oral, BID  levothyroxine, 25 mcg, Oral, Daily  pantoprazole, 40 mg, Oral, Q AM  sodium bicarbonate, 1,300 mg, Oral, BID  sodium chloride, 10 mL, Intravenous, Q12H  venlafaxine XR, 37.5 mg, Oral, Daily        IV Meds:          Results Reviewed:   I have personally reviewed the results from the time of this admission to 12/14/2024 07:31 EST     Results from last 7 days   Lab Units 12/14/24  0647 12/13/24  0616 12/12/24  0643   SODIUM mmol/L 142 142 142   POTASSIUM mmol/L 3.5 3.7 4.5   CHLORIDE mmol/L 98 102 105   CO2 mmol/L 32.5* 27.7 23.7   BUN mg/dL 37* 36* 40*   CREATININE mg/dL 4.62* 5.03* 5.71*   CALCIUM mg/dL 9.2 8.7 8.7   BILIRUBIN mg/dL 0.3 0.2 0.2   ALK PHOS U/L 91 85 89   ALT (SGPT) U/L 56* 65* 85*   AST (SGOT) U/L 19 18 22   GLUCOSE mg/dL 134* 122* 116*       Estimated Creatinine Clearance: 10.9 mL/min  "(A) (by C-G formula based on SCr of 4.62 mg/dL (H)).                Results from last 7 days   Lab Units 12/14/24  0647 12/13/24  0616 12/12/24  0643 12/11/24  0726 12/10/24  0633   WBC 10*3/mm3 6.23 5.90 7.18 6.65 6.27   HEMOGLOBIN g/dL 10.6* 9.7* 9.4* 9.4* 9.6*   PLATELETS 10*3/mm3 357 287 262 228 202             Brief Urine Lab Results  (Last result in the past 365 days)        Color   Clarity   Blood   Leuk Est   Nitrite   Protein   CREAT   Urine HCG        12/08/24 1334 Yellow   Cloudy   Small (1+)   Negative   Negative   30 mg/dL (1+)                   No results found for: \"UTPCR\"    Imaging Results (Last 24 Hours)       ** No results found for the last 24 hours. **                Assessment / Plan     ASSESSMENT:  GORGE: Presented with sCr 6.84 on baseline ~1.2.  On initial presentation she was noted to be slightly hyponatremic and does have significantly low bicarb but electrolyte appears to be stable.  Likely pre-renal due to volume loss, hypovolemia as patient has been vomiting and having diarrhea.  It appears she was volume depleted and taking nonsteroidals likely contributing to significant worsening of renal function.  UA done initially did not show any significant casts.  CT AP without any signs of obstruction.   Gastroenteritis: Likely viral etiology. Supportive care.  Productive cough: Although WBC normal and afebrile, does have elevated procal and was placed on ABX as precaution.  Transaminitis: Etiology unclear. Hepatitis panel negative. Abdominal tenderness not near liver or gallbladder. Liver enzymes have significantly improved since presentation.  DM II: continue the sliding scale insulin.      PLAN:    Renal function appears to be slightly better this morning.  I doubt at this point that she will need dialysis.  She still has some extra volume will go ahead and put her on 40 of Lasix every day.  Potassium is on the low side we will go ahead and add spironolactone 25 mg a day as well.  Strict " intake and output. Avoid known nephrotoxic meds and IV contrast exposure if able.  Serum bicarb has normalized, I will go ahead and stop it altogether.  Remove Gross catheter.  Details were discussed with the patient as well as family in the room.  Patient's son at the bedside he had multiple questions all were answered.  Details were also discussed with the hospitalist service and or other providers as needed.   Continue with rest of the current treatment plan, and monitor with surveillance labs.  Further recommendations will depend on clinical course of the patient during the current hospitalization.   I have reviewed the copied text to this note, it was edited and the changes made as needed.  It is accurate to the point, when the note was signed today.     Thank you for involving us in the care of Latonya Mei.  Please feel free to call with any questions.    Jamey Car MD, NASIR  12/14/24  07:31 [facility]    [facility]  [phone]      Part of this note may be an electronic transcription/translation of spoken language to printed text using the Dragon Dictation System.

## 2024-12-14 NOTE — PAYOR COMM NOTE
"Latonya Benavidez (74 y.o. Female)       Date of Birth   1950    Social Security Number       Address   [address] [id number]    Home Phone   [phone]    MRN   [id number]       [facility]    Marital Status                               Admission Date   12/8/24    Admission Type   Emergency    Admitting Provider   Juan Lovelace MD    Attending Provider   Kerley, Brian Joseph, DO    Department, Room/Bed   [facility], W210/1       Discharge Date       Discharge Disposition       Discharge Destination                                 Attending Provider: Kerley, Brian Joseph, DO    Allergies: Dust Mite Extract, Grass, Latex, Pineapple, Rye, Tuberculin Tests, Wheat    Isolation: None   Infection: None   Code Status: CPR    Ht: 167.6 cm (65.98\")   Wt: 66.9 kg (147 lb 7.8 oz)    Admission Cmt: None   Principal Problem: GORGE (acute kidney injury) [N17.9]                   Active Insurance as of 12/8/2024       Primary Coverage       Payor Plan Insurance Group Employer/Plan Group    ANTHEM MEDICARE REPLACEMENT ANTHEM MED ADV HMO [id number]       Payor Plan Address Payor Plan Phone Number Payor Plan Fax Number Effective Dates    [address] [phone]  1/1/2024 - None Entered    [facility] [id number]         Subscriber Name Subscriber Birth Date Member ID       LATONYA BENAVIDEZ 1950 [id number]                     Emergency Contacts        (Rel.) Home Phone Work Phone Mobile Phone    HamidaDaniel kurtz (Spouse) [phone] -- --    Shahriar Daugherty (Son) [phone] -- [phone]    BROOK DAUGHERTY (Other) [phone] -- --              Lab Results (last 48 hours)       Procedure Component Value Units Date/Time    POC Glucose Once [[id number]]  (Abnormal) Collected: 12/14/24 1059    Specimen: Blood Updated: 12/14/24 1101     Glucose 155 mg/dL      Comment: Serial Number: [id number]:  [id number]       POC Glucose 4x Daily Before Meals & at Bedtime " [[id number]]  (Abnormal) Collected: 12/14/24 0723    Specimen: Blood Updated: 12/14/24 0729     Glucose 131 mg/dL      Comment: Serial Number: [id number]:  [id number]       Comprehensive Metabolic Panel [[id number]]  (Abnormal) Collected: 12/14/24 0647    Specimen: Blood Updated: 12/14/24 0727     Glucose 134 mg/dL      BUN 37 mg/dL      Creatinine 4.62 mg/dL      Sodium 142 mmol/L      Potassium 3.5 mmol/L      Chloride 98 mmol/L      CO2 32.5 mmol/L      Calcium 9.2 mg/dL      Total Protein 6.8 g/dL      Albumin 3.8 g/dL      ALT (SGPT) 56 U/L      AST (SGOT) 19 U/L      Alkaline Phosphatase 91 U/L      Total Bilirubin 0.3 mg/dL      Globulin 3.0 gm/dL      A/G Ratio 1.3 g/dL      BUN/Creatinine Ratio 8.0     Anion Gap 11.5 mmol/L      eGFR 9.3 mL/min/1.73     Narrative:      GFR Categories in Chronic Kidney Disease (CKD)      GFR Category          GFR (mL/min/1.73)    Interpretation  G1                     90 or greater         Normal or high (1)  G2                      60-89                Mild decrease (1)  G3a                   45-59                Mild to moderate decrease  G3b                   30-44                Moderate to severe decrease  G4                    15-29                Severe decrease  G5                    14 or less           Kidney failure          (1)In the absence of evidence of kidney disease, neither GFR category G1 or G2 fulfill the criteria for CKD.    eGFR calculation 2021 CKD-EPI creatinine equation, which does not include race as a factor    CBC & Differential [[id number]]  (Abnormal) Collected: 12/14/24 0647    Specimen: Blood Updated: 12/14/24 0706    Narrative:      The following orders were created for panel order CBC & Differential.  Procedure                               Abnormality         Status                     ---------                               -----------         ------                     CBC Auto Differential[[id number]]        Abnormal            Final  result                 Please view results for these tests on the individual orders.    CBC Auto Differential [[id number]]  (Abnormal) Collected: 12/14/24 0647    Specimen: Blood Updated: 12/14/24 0706     WBC 6.23 10*3/mm3      RBC 3.47 10*6/mm3      Hemoglobin 10.6 g/dL      Hematocrit 31.7 %      MCV 91.4 fL      MCH 30.5 pg      MCHC 33.4 g/dL      RDW 13.3 %      RDW-SD 45.0 fl      MPV 9.7 fL      Platelets 357 10*3/mm3      Neutrophil % 61.9 %      Lymphocyte % 26.2 %      Monocyte % 5.8 %      Eosinophil % 4.8 %      Basophil % 0.8 %      Immature Grans % 0.5 %      Neutrophils, Absolute 3.86 10*3/mm3      Lymphocytes, Absolute 1.63 10*3/mm3      Monocytes, Absolute 0.36 10*3/mm3      Eosinophils, Absolute 0.30 10*3/mm3      Basophils, Absolute 0.05 10*3/mm3      Immature Grans, Absolute 0.03 10*3/mm3      nRBC 0.0 /100 WBC     POC Glucose Once [[id number]]  (Normal) Collected: 12/14/24 0509    Specimen: Blood Updated: 12/14/24 0511     Glucose 130 mg/dL      Comment: Serial Number: [id number]:  [id number]       POC Glucose 4x Daily Before Meals & at Bedtime [[id number]]  (Abnormal) Collected: 12/13/24 2254    Specimen: Blood Updated: 12/13/24 2259     Glucose 191 mg/dL      Comment: Serial Number: [id number]:  [id number]       POC Glucose Once [[id number]]  (Abnormal) Collected: 12/13/24 1611    Specimen: Blood Updated: 12/13/24 1620     Glucose 147 mg/dL      Comment: Serial Number: [id number]:  [id number]       Blood Culture - Blood, Arm, Right [[id number]]  (Normal) Collected: 12/08/24 1515    Specimen: Blood from Arm, Right Updated: 12/13/24 1531     Blood Culture No growth at 5 days    Narrative:      Less than seven (7) mL's of blood was collected.  Insufficient quantity may yield false negative results.    Blood Culture - Blood, Arm, Left [[id number]]  (Normal) Collected: 12/08/24 1500    Specimen: Blood from Arm, Left Updated: 12/13/24 1531     Blood Culture No growth at 5 days    Narrative:       Less than seven (7) mL's of blood was collected.  Insufficient quantity may yield false negative results.    POC Glucose Once [[id number]]  (Abnormal) Collected: 12/13/24 1033    Specimen: Blood Updated: 12/13/24 1035     Glucose 170 mg/dL      Comment: Serial Number: [id number]:  [id number]       Comprehensive Metabolic Panel [[id number]]  (Abnormal) Collected: 12/13/24 0616    Specimen: Blood Updated: 12/13/24 0643     Glucose 122 mg/dL      BUN 36 mg/dL      Creatinine 5.03 mg/dL      Sodium 142 mmol/L      Potassium 3.7 mmol/L      Chloride 102 mmol/L      CO2 27.7 mmol/L      Calcium 8.7 mg/dL      Total Protein 6.0 g/dL      Albumin 3.3 g/dL      ALT (SGPT) 65 U/L      AST (SGOT) 18 U/L      Alkaline Phosphatase 85 U/L      Total Bilirubin 0.2 mg/dL      Globulin 2.7 gm/dL      A/G Ratio 1.2 g/dL      BUN/Creatinine Ratio 7.2     Anion Gap 12.3 mmol/L      eGFR 8.4 mL/min/1.73     Narrative:      GFR Categories in Chronic Kidney Disease (CKD)      GFR Category          GFR (mL/min/1.73)    Interpretation  G1                     90 or greater         Normal or high (1)  G2                      60-89                Mild decrease (1)  G3a                   45-59                Mild to moderate decrease  G3b                   30-44                Moderate to severe decrease  G4                    15-29                Severe decrease  G5                    14 or less           Kidney failure          (1)In the absence of evidence of kidney disease, neither GFR category G1 or G2 fulfill the criteria for CKD.    eGFR calculation 2021 CKD-EPI creatinine equation, which does not include race as a factor    Iron Profile [[id number]]  (Normal) Collected: 12/13/24 0616    Specimen: Blood Updated: 12/13/24 0643     Iron 67 mcg/dL      Iron Saturation (TSAT) 22 %      Transferrin 204 mg/dL      TIBC 304 mcg/dL     CBC & Differential [[id number]]  (Abnormal) Collected: 12/13/24 0616    Specimen: Blood Updated: 12/13/24  0623    Narrative:      The following orders were created for panel order CBC & Differential.  Procedure                               Abnormality         Status                     ---------                               -----------         ------                     CBC Auto Differential[[id number]]        Abnormal            Final result                 Please view results for these tests on the individual orders.    CBC Auto Differential [[id number]]  (Abnormal) Collected: 24 0616    Specimen: Blood Updated: 24 0623     WBC 5.90 10*3/mm3      RBC 3.17 10*6/mm3      Hemoglobin 9.7 g/dL      Hematocrit 28.4 %      MCV 89.6 fL      MCH 30.6 pg      MCHC 34.2 g/dL      RDW 13.2 %      RDW-SD 44.0 fl      MPV 9.6 fL      Platelets 287 10*3/mm3      Neutrophil % 61.7 %      Lymphocyte % 24.6 %      Monocyte % 7.1 %      Eosinophil % 5.4 %      Basophil % 0.7 %      Immature Grans % 0.5 %      Neutrophils, Absolute 3.64 10*3/mm3      Lymphocytes, Absolute 1.45 10*3/mm3      Monocytes, Absolute 0.42 10*3/mm3      Eosinophils, Absolute 0.32 10*3/mm3      Basophils, Absolute 0.04 10*3/mm3      Immature Grans, Absolute 0.03 10*3/mm3      nRBC 0.0 /100 WBC     POC Glucose Once [[id number]]  (Normal) Collected: 24 1645    Specimen: Blood Updated: 24 1653     Glucose 123 mg/dL      Comment: Serial Number: [id number]:  [id number]             Imaging Results (Last 48 Hours)       ** No results found for the last 48 hours. **             Physician Progress Notes (last 48 hours)        Jamey Car MD, FASN at 24 0731                [facility] Progress Note  [facility]        Patient Name: Latonya Mei  : 1950  MRN: [id number]   LOS: 6 days    Patient Care Team:  Chrystal Buckley APRN as PCP - General (Nurse Practitioner)  Pao John MD as Consulting Physician (General Surgery)  Glen Ríos MD as Consulting Physician  (Cardiology)  Nia García APRN as Nurse Practitioner (Gastroenterology)    Chief Complaint:    Chief Complaint   Patient presents with    Pain With Breathing    Cough     Primary Care Physician:  Chrystal Buckley APRN  Date of admission: 12/8/2024    Subjective     Interval History:   Follow-up acute kidney injury.  She is lying in the bed awake alert and interactive.  She does complain of intermittent shortness of breath but denies any chest pain.  She responded well to diuretics yesterday with over 5L UOP. Volume status seems improved.   Events noted from last 24 hours.  I reviewed the chart and other providers notes, labs and procedures done since my last note.    Review of Systems:   As noted above.    Objective     Vitals:   Temp:  [97.7 °F (36.5 °C)-99.1 °F (37.3 °C)] 98.3 °F (36.8 °C)  Heart Rate:  [68-73] 73  Resp:  [16-20] 16  BP: (126-156)/(61-75) 126/61    Intake/Output Summary (Last 24 hours) at 12/14/2024 0731  Last data filed at 12/14/2024 0600  Gross per 24 hour   Intake 720 ml   Output 3250 ml   Net -2530 ml       Physical Exam:    Constitutional: Awake, alert  Eyes: sclerae anicteric, no conjunctival injection  HEENT: mucous membranes dry  Neck: Supple, no thyromegaly, no lymphadenopathy, trachea midline, No JVD  Respiratory: Crackles noted half of the chest today.  Cardiovascular: RRR, no murmurs, rubs, or gallops.  Gastrointestinal: Positive bowel sounds, obese, soft, + tender around epigastric area on palpation  Musculoskeletal: 1+ edema, no clubbing or cyanosis  Psychiatric: Appropriate affect, cooperative  Neurologic: Oriented x 3, moving all extremities, Cranial Nerves grossly intact, speech clear  Skin: warm and dry, no rashes     Scheduled Meds:     Current Facility-Administered Medications   Medication Dose Route Frequency Provider Last Rate Last Admin    acetaminophen (TYLENOL) tablet 650 mg  650 mg Oral Q4H PRN Juan Lovelace MD   650 mg at 12/13/24 2240    Or    acetaminophen  (TYLENOL) 160 MG/5ML oral solution 650 mg  650 mg Oral Q4H PRN Juan Lovelace MD        Or    acetaminophen (TYLENOL) suppository 650 mg  650 mg Rectal Q4H PRN Juan Lovelace MD        amLODIPine (NORVASC) tablet 10 mg  10 mg Oral Q24H Jamey Car MD, FASN   10 mg at 12/13/24 0902    aspirin chewable tablet 81 mg  81 mg Oral Daily Juan Lovelace MD   81 mg at 12/13/24 0903    sennosides-docusate (PERICOLACE) 8.6-50 MG per tablet 2 tablet  2 tablet Oral BID PRN Juan Lovelace MD   2 tablet at 12/10/24 0935    And    polyethylene glycol (MIRALAX) packet 17 g  17 g Oral Daily PRN Juan Lovelace MD        And    bisacodyl (DULCOLAX) EC tablet 5 mg  5 mg Oral Daily PRN Juan Lovelace MD        And    bisacodyl (DULCOLAX) suppository 10 mg  10 mg Rectal Daily PRN Juan Lovelace MD        dextrose (D50W) (25 g/50 mL) IV injection 25 g  25 g Intravenous Q15 Min PRN Juan Lovelace MD        dextrose (GLUTOSE) oral gel 15 g  15 g Oral Q15 Min PRN Juan Lovelace MD        glucagon (GLUCAGEN) injection 1 mg  1 mg Intramuscular Q15 Min PRN Juan Lovelace MD        heparin (porcine) 5000 UNIT/ML injection 5,000 Units  5,000 Units Subcutaneous Q12H Juan Lovelace MD   5,000 Units at 12/13/24 2240    hydrALAZINE (APRESOLINE) injection 10 mg  10 mg Intravenous Q4H PRN Kerley, Brian Joseph, DO   10 mg at 12/12/24 1435    Insulin Lispro (humaLOG) injection 2-7 Units  2-7 Units Subcutaneous 4x Daily AC & at Bedtime Juan Lovelace MD   2 Units at 12/13/24 2256    lactobacillus acidophilus (RISAQUAD) capsule 1 capsule  1 capsule Oral BID Juan Lovelace MD   1 capsule at 12/13/24 2240    levothyroxine (SYNTHROID, LEVOTHROID) tablet 25 mcg  25 mcg Oral Daily Juan Lovelace MD   25 mcg at 12/14/24 0638    ondansetron (ZOFRAN) injection 4 mg  4 mg Intravenous Q6H PRN Juan Lovelace MD   4 mg at 12/12/24 1658    pantoprazole (PROTONIX) EC tablet 40 mg  40 mg Oral Q AM Juan Lovelace MD   40 mg at 12/14/24 0527    promethazine (PHENERGAN) tablet 12.5 mg   12.5 mg Oral Q8H PRN Juan Lovelace MD        sodium bicarbonate tablet 1,300 mg  1,300 mg Oral BID Jamey Car MD, FASN   1,300 mg at 12/13/24 2240    sodium chloride 0.9 % flush 10 mL  10 mL Intravenous Q12H Juan Lovelace MD   10 mL at 12/13/24 2242    sodium chloride 0.9 % flush 10 mL  10 mL Intravenous PRN Juan Lovelace MD        sodium chloride 0.9 % infusion 40 mL  40 mL Intravenous PRN Juan Lovelace MD        venlafaxine XR (EFFEXOR-XR) 24 hr capsule 37.5 mg  37.5 mg Oral Daily Juan Lovelace MD   37.5 mg at 12/13/24 0902       amLODIPine, 10 mg, Oral, Q24H  aspirin, 81 mg, Oral, Daily  heparin (porcine), 5,000 Units, Subcutaneous, Q12H  insulin lispro, 2-7 Units, Subcutaneous, 4x Daily AC & at Bedtime  lactobacillus acidophilus, 1 capsule, Oral, BID  levothyroxine, 25 mcg, Oral, Daily  pantoprazole, 40 mg, Oral, Q AM  sodium bicarbonate, 1,300 mg, Oral, BID  sodium chloride, 10 mL, Intravenous, Q12H  venlafaxine XR, 37.5 mg, Oral, Daily        IV Meds:          Results Reviewed:   I have personally reviewed the results from the time of this admission to 12/14/2024 07:31 EST     Results from last 7 days   Lab Units 12/14/24  0647 12/13/24  0616 12/12/24  0643   SODIUM mmol/L 142 142 142   POTASSIUM mmol/L 3.5 3.7 4.5   CHLORIDE mmol/L 98 102 105   CO2 mmol/L 32.5* 27.7 23.7   BUN mg/dL 37* 36* 40*   CREATININE mg/dL 4.62* 5.03* 5.71*   CALCIUM mg/dL 9.2 8.7 8.7   BILIRUBIN mg/dL 0.3 0.2 0.2   ALK PHOS U/L 91 85 89   ALT (SGPT) U/L 56* 65* 85*   AST (SGOT) U/L 19 18 22   GLUCOSE mg/dL 134* 122* 116*       Estimated Creatinine Clearance: 10.9 mL/min (A) (by C-G formula based on SCr of 4.62 mg/dL (H)).                Results from last 7 days   Lab Units 12/14/24  0647 12/13/24  0616 12/12/24  0643 12/11/24  0726 12/10/24  0633   WBC 10*3/mm3 6.23 5.90 7.18 6.65 6.27   HEMOGLOBIN g/dL 10.6* 9.7* 9.4* 9.4* 9.6*   PLATELETS 10*3/mm3 357 287 262 228 202             Brief Urine Lab Results  (Last result in the past  "365 days)        Color   Clarity   Blood   Leuk Est   Nitrite   Protein   CREAT   Urine HCG        12/08/24 1334 Yellow   Cloudy   Small (1+)   Negative   Negative   30 mg/dL (1+)                   No results found for: \"UTPCR\"    Imaging Results (Last 24 Hours)       ** No results found for the last 24 hours. **                Assessment / Plan     ASSESSMENT:  GORGE: Presented with sCr 6.84 on baseline ~1.2.  On initial presentation she was noted to be slightly hyponatremic and does have significantly low bicarb but electrolyte appears to be stable.  Likely pre-renal due to volume loss, hypovolemia as patient has been vomiting and having diarrhea.  It appears she was volume depleted and taking nonsteroidals likely contributing to significant worsening of renal function.  UA done initially did not show any significant casts.  CT AP without any signs of obstruction.   Gastroenteritis: Likely viral etiology. Supportive care.  Productive cough: Although WBC normal and afebrile, does have elevated procal and was placed on ABX as precaution.  Transaminitis: Etiology unclear. Hepatitis panel negative. Abdominal tenderness not near liver or gallbladder. Liver enzymes have significantly improved since presentation.  DM II: continue the sliding scale insulin.      PLAN:    Renal function appears to be slightly better this morning.  I doubt at this point that she will need dialysis.  She still has some extra volume will go ahead and put her on 40 of Lasix every day.  Potassium is on the low side we will go ahead and add spironolactone 25 mg a day as well.  Strict intake and output. Avoid known nephrotoxic meds and IV contrast exposure if able.  Serum bicarb has normalized, I will go ahead and stop it altogether.  Remove Gross catheter.  Details were discussed with the patient as well as family in the room.  Patient's son at the bedside he had multiple questions all were answered.  Details were also discussed with the " hospitalist service and or other providers as needed.   Continue with rest of the current treatment plan, and monitor with surveillance labs.  Further recommendations will depend on clinical course of the patient during the current hospitalization.   I have reviewed the copied text to this note, it was edited and the changes made as needed.  It is accurate to the point, when the note was signed today.     Thank you for involving us in the care of Latonya Mei.  Please feel free to call with any questions.    Jamey Car MD, NASIR  24  07:31 [facility]    [facility]  [phone]      Part of this note may be an electronic transcription/translation of spoken language to printed text using the Dragon Dictation System.                   Electronically signed by Jamey Car MD, NASIR at 24 1307       Kerley, Brian Joseph, DO at 24 1042              [facility]    PROGRESS NOTE    Name:  Latonya Mei   Age:  74 y.o.  Sex:  female  :  1950  MRN:  [id number]   Visit Number:  [id number]  Admission Date:  2024  Date Of Service:  24  Primary Care Physician:  Chrystal Buckley APRN     LOS: 5 days :    Chief Complaint:      Follow-up; renal failure    Subjective:    Feels ok today.  Still having cough but no chest pain.    Hospital Course:    Latonya Mei is a 74-year-old female with history of type 2 diabetes, hypothyroidism, GERD, fibromyalgia was brought to the emergency room by her daughter with multiple symptoms including chills, abdominal discomfort, nausea, vomiting, cough and shortness of breath.  Patient states that she has had chronic left knee joint pain due to torn meniscus.  She has been following up with Dr. Vazquez and apparently has been scheduled for surgery.  She has been taking 2 tablets of Motrin/Tylenol as needed for the last 3 weeks.  4 days ago, she started having epigastric pain nausea, vomiting,  chills and low-grade fevers.  She called her primary care provider and apparently was seen by her and urine was sent.  She was placed on ciprofloxacin.  Patient however has not had any relief of symptoms and came to the emergency room.     Patient was last admitted to this facility in June 2024 with similar complaints and at that time she was noted to have mild GORGE.  Due to shortness of breath, she was seen by cardiology and did undergo cardiac catheterization which was negative for any significant coronary artery disease at that time.     In the emergency room, she was afebrile and hemodynamically stable saturating at 97% on room air.  Initial CK was within normal range.  Troponin 21, proBNP 4000, sodium 132, CO2 19, anion gap 19, BUN 52, creatinine 6.84 (baseline 1.2), glucose 168, alkaline phosphatase 147, , .  Lactic acid was within normal range but procalcitonin is elevated at 4.83.  CBC was unremarkable except for a hemoglobin of 11 (baseline).  Monospot test was negative.  Urine analysis shows 3-5 RBCs but otherwise negative for any evidence of urinary tract infection.  Respiratory panel was negative.  Chest x-ray showed no acute process.  CT of the abdomen and pelvis without contrast was negative for any hydronephrosis or nephrolithiasis.  It showed diverticulosis, benign left renal cyst.  EKG was unremarkable.  Patient was given 2 L of normal saline bolus, Zofran and Zosyn in the emergency room.  Blood cultures were done and acute hepatitis panel was sent.     Review of Systems:     All systems were reviewed and negative except as mentioned in subjective, assessment and plan.    Vital Signs:    Temp:  [98 °F (36.7 °C)-98.3 °F (36.8 °C)] 98.3 °F (36.8 °C)  Heart Rate:  [61-70] 70  Resp:  [16-18] 18  BP: (142-192)/(63-81) 164/79    Intake and output:    I/O last 3 completed shifts:  In: 720 [P.O.:720]  Out: 6900 [Urine:6900]  I/O this shift:  In: -   Out: 1000 [Urine:1000]    Physical  "Examination:    General Appearance:  Alert and cooperative.  Comfortable in bed.   Head:  Atraumatic and normocephalic.   Eyes: Conjunctivae and sclerae normal, no icterus. No pallor.   Throat: No oral lesions, no thrush, oral mucosa moist.   Neck: Supple, trachea midline, no thyromegaly.   Lungs:   Breath sounds heard bilaterally equally.  No wheezing or crackles. No Pleural rub or bronchial breathing.   Heart:  Normal S1 and S2, no murmur, no gallop, no rub. No JVD.   Abdomen:   Normal bowel sounds, no masses, no organomegaly. Soft, mild nonspecific tenderness, nondistended, no rebound tenderness.  Obese.   Extremities: Supple, bilateral lower extremity pitting edema, no cyanosis, no clubbing.   Skin: Warm.   Neurologic: Alert and oriented x 3. No facial asymmetry. Moves all four limbs. No tremors.      Laboratory results:    Results from last 7 days   Lab Units 12/13/24  0616 12/12/24  0643 12/11/24  0726   SODIUM mmol/L 142 142 141   POTASSIUM mmol/L 3.7 4.5 4.4   CHLORIDE mmol/L 102 105 109*   CO2 mmol/L 27.7 23.7 20.4*   BUN mg/dL 36* 40* 45*   CREATININE mg/dL 5.03* 5.71* 5.79*   CALCIUM mg/dL 8.7 8.7 8.5*   BILIRUBIN mg/dL 0.2 0.2 0.2   ALK PHOS U/L 85 89 99   ALT (SGPT) U/L 65* 85* 113*   AST (SGOT) U/L 18 22 26   GLUCOSE mg/dL 122* 116* 120*     Results from last 7 days   Lab Units 12/13/24  0616 12/12/24  0643 12/11/24  0726   WBC 10*3/mm3 5.90 7.18 6.65   HEMOGLOBIN g/dL 9.7* 9.4* 9.4*   HEMATOCRIT % 28.4* 28.3* 29.1*   PLATELETS 10*3/mm3 287 262 228         Results from last 7 days   Lab Units 12/08/24  1532 12/08/24  1333   CK TOTAL U/L  --  113   HSTROP T ng/L 18* 21*     Results from last 7 days   Lab Units 12/08/24  1515 12/08/24  1500   BLOODCX  No growth at 4 days No growth at 4 days     No results for input(s): \"PHART\", \"[id number]\", \"PO2ART\", \"BXK6EKS\", \"BASEEXCESS\" in the last 8760 hours.   I have reviewed the patient's laboratory results.    Radiology results:    No radiology results from the " last 24 hrs  I have reviewed the patient's radiology reports.    Medication Review:     I have reviewed the patient's active and prn medications.     Problem List:      GORGE (acute kidney injury)    Type 2 diabetes mellitus without complication, without long-term current use of insulin    Gastroesophageal reflux disease      Assessment/Plan:    Inpatient general floor admission 12/8/2024 with acute renal failure.  Due to elevated procalcitonin and cough, she was started on Rocephin empirically.    GORGE  Nephrology consultation, recommendations appreciated.  Discontinued IVF.  Gross catheter.  Will consider to monitor to see if she needs dialysis during this hospitalization.  Likely secondary to NSAIDs and dehydration.  Strongly advised to discontinue NSAIDs.  CT of the abdomen negative for any hydronephrosis.  Constipation  Bowel regimen.  Hypertension  Amlodipine.  Hydralazine as needed.  Likely due to renal status and fluid overload.  Discontinued IVF per recommendation nephrology.  She will likely need dialysis.  Cough  Elevated procalcitonin  Rocephin empirically.  Blood cultures negative.  Transaminitis  Monitoring.  Possibly secondary to dehydration.  Viral hepatitis panel negative.  No specific right upper quadrant tenderness.    Chronic: type 2 diabetes, hypothyroidism, GERD, fibromyalgia  Subcutaneous insulin protocol.  Continue other home medications.    DVT Prophylaxis: Heparin  Code Status: Full code  Diet: Diabetic  Discharge Plan: Expecting prolonged course due to likely requiring dialysis initiation    Updated daughter Dorene during course.    Brian Joseph Kerley, DO  12/13/24  10:42 EST    Dictated utilizing Dragon dictation.    I have reviewed the copied text and it is accurate as of 12/13/2024       Electronically signed by Kerley, Brian Joseph, DO at 12/13/24 [id number]       Jamey Car MD, FASN at 12/13/24 6466                [facility] Progress Note  [facility]  [facility]        Patient Name: Latonya Mei  : 1950  MRN: [id number]   LOS: 5 days    Patient Care Team:  Chrystal Buckley APRN as PCP - General (Nurse Practitioner)  Pao John MD as Consulting Physician (General Surgery)  Glen Ríos MD as Consulting Physician (Cardiology)  Nia García APRN as Nurse Practitioner (Gastroenterology)    Chief Complaint:    Chief Complaint   Patient presents with    Pain With Breathing    Cough     Primary Care Physician:  Chrystal Buckley APRN  Date of admission: 2024    Subjective     Interval History:   Follow-up acute kidney injury.  She is lying in the bed awake alert and interactive.  She does complain of intermittent shortness of breath but denies any chest pain.  She responded well to diuretics yesterday with over 5L UOP. Volume status seems improved.   Events noted from last 24 hours.  I reviewed the chart and other providers notes, labs and procedures done since my last note.    Review of Systems:   As noted above.    Objective     Vitals:   Temp:  [98 °F (36.7 °C)-98.3 °F (36.8 °C)] 98.3 °F (36.8 °C)  Heart Rate:  [61-70] 70  Resp:  [16-18] 18  BP: (142-192)/(63-81) 164/79    Intake/Output Summary (Last 24 hours) at 2024 0753  Last data filed at 2024 0328  Gross per 24 hour   Intake 720 ml   Output 5400 ml   Net -4680 ml       Physical Exam:    Constitutional: Awake, alert  Eyes: sclerae anicteric, no conjunctival injection  HEENT: mucous membranes dry  Neck: Supple, no thyromegaly, no lymphadenopathy, trachea midline, No JVD  Respiratory: Crackles noted half of the chest today.  Cardiovascular: RRR, no murmurs, rubs, or gallops.  Gastrointestinal: Positive bowel sounds, obese, soft, + tender around epigastric area on palpation  Musculoskeletal: 1+ edema, no clubbing or cyanosis  Psychiatric: Appropriate affect, cooperative  Neurologic: Oriented x 3, moving all extremities, Cranial Nerves grossly intact, speech  clear  Skin: warm and dry, no rashes     Scheduled Meds:     Current Facility-Administered Medications   Medication Dose Route Frequency Provider Last Rate Last Admin    acetaminophen (TYLENOL) tablet 650 mg  650 mg Oral Q4H PRN Juan Lovelace MD   650 mg at 12/12/24 0640    Or    acetaminophen (TYLENOL) 160 MG/5ML oral solution 650 mg  650 mg Oral Q4H PRN Juan Lovelace MD        Or    acetaminophen (TYLENOL) suppository 650 mg  650 mg Rectal Q4H PRN Juan Lovelace MD        amLODIPine (NORVASC) tablet 10 mg  10 mg Oral Q24H Jamey Car MD, FASN        aspirin chewable tablet 81 mg  81 mg Oral Daily Juan Lovelace MD   81 mg at 12/12/24 0820    sennosides-docusate (PERICOLACE) 8.6-50 MG per tablet 2 tablet  2 tablet Oral BID PRN Juan Lovelace MD   2 tablet at 12/10/24 0935    And    polyethylene glycol (MIRALAX) packet 17 g  17 g Oral Daily PRN Juan Lovelace MD        And    bisacodyl (DULCOLAX) EC tablet 5 mg  5 mg Oral Daily PRN Juan Lovelace MD        And    bisacodyl (DULCOLAX) suppository 10 mg  10 mg Rectal Daily PRN Juan Lovelace MD        dextrose (D50W) (25 g/50 mL) IV injection 25 g  25 g Intravenous Q15 Min PRN Juan Lovelace MD        dextrose (GLUTOSE) oral gel 15 g  15 g Oral Q15 Min PRN Juan Lovelace MD        glucagon (GLUCAGEN) injection 1 mg  1 mg Intramuscular Q15 Min PRN Juan Lovelace MD        heparin (porcine) 5000 UNIT/ML injection 5,000 Units  5,000 Units Subcutaneous Q12H Juan Lovelace MD   5,000 Units at 12/12/24 2048    hydrALAZINE (APRESOLINE) injection 10 mg  10 mg Intravenous Q4H PRN Kerley, Brian Joseph, DO   10 mg at 12/12/24 1435    HYDROcodone-acetaminophen (NORCO) 7.5-325 MG per tablet 1 tablet  1 tablet Oral Q6H PRN Juan Lovelace MD   1 tablet at 12/12/24 2355    Insulin Lispro (humaLOG) injection 2-7 Units  2-7 Units Subcutaneous 4x Daily AC & at Bedtime Juan Lovelace MD   2 Units at 12/12/24 2048    lactobacillus acidophilus (RISAQUAD) capsule 1 capsule  1 capsule Oral BID [place]  MD Juan   1 capsule at 12/12/24 2048    levothyroxine (SYNTHROID, LEVOTHROID) tablet 25 mcg  25 mcg Oral Daily Juan Lovelace MD   25 mcg at 12/12/24 0630    ondansetron (ZOFRAN) injection 4 mg  4 mg Intravenous Q6H PRN Juan Lovelace MD   4 mg at 12/12/24 1658    pantoprazole (PROTONIX) EC tablet 40 mg  40 mg Oral Q AM Juan Lovelace MD   40 mg at 12/12/24 0630    promethazine (PHENERGAN) tablet 12.5 mg  12.5 mg Oral Q8H PRN Juan Lovelace MD        sodium bicarbonate tablet 1,300 mg  1,300 mg Oral 4x Daily Jamey Car MD, FASN   1,300 mg at 12/12/24 2048    sodium chloride 0.9 % flush 10 mL  10 mL Intravenous Q12H Juan Lovelace MD   10 mL at 12/12/24 2355    sodium chloride 0.9 % flush 10 mL  10 mL Intravenous PRN Juan Lovelace MD        sodium chloride 0.9 % infusion 40 mL  40 mL Intravenous PRN Juan Lovelace MD        venlafaxine XR (EFFEXOR-XR) 24 hr capsule 37.5 mg  37.5 mg Oral Daily Juan Lovelace MD   37.5 mg at 12/12/24 0820       amLODIPine, 10 mg, Oral, Q24H  aspirin, 81 mg, Oral, Daily  heparin (porcine), 5,000 Units, Subcutaneous, Q12H  insulin lispro, 2-7 Units, Subcutaneous, 4x Daily AC & at Bedtime  lactobacillus acidophilus, 1 capsule, Oral, BID  levothyroxine, 25 mcg, Oral, Daily  pantoprazole, 40 mg, Oral, Q AM  sodium bicarbonate, 1,300 mg, Oral, 4x Daily  sodium chloride, 10 mL, Intravenous, Q12H  venlafaxine XR, 37.5 mg, Oral, Daily        IV Meds:          Results Reviewed:   I have personally reviewed the results from the time of this admission to 12/13/2024 07:53 EST     Results from last 7 days   Lab Units 12/13/24  0616 12/12/24  0643 12/11/24  0726   SODIUM mmol/L 142 142 141   POTASSIUM mmol/L 3.7 4.5 4.4   CHLORIDE mmol/L 102 105 109*   CO2 mmol/L 27.7 23.7 20.4*   BUN mg/dL 36* 40* 45*   CREATININE mg/dL 5.03* 5.71* 5.79*   CALCIUM mg/dL 8.7 8.7 8.5*   BILIRUBIN mg/dL 0.2 0.2 0.2   ALK PHOS U/L 85 89 99   ALT (SGPT) U/L 65* 85* 113*   AST (SGOT) U/L 18 22 26   GLUCOSE mg/dL 122*  "116* 120*       Estimated Creatinine Clearance: 10 mL/min (A) (by C-G formula based on SCr of 5.03 mg/dL (H)).                Results from last 7 days   Lab Units 12/13/24  0616 12/12/24  0643 12/11/24  0726 12/10/24  0633 12/09/24  0539   WBC 10*3/mm3 5.90 7.18 6.65 6.27 4.84   HEMOGLOBIN g/dL 9.7* 9.4* 9.4* 9.6* 9.1*   PLATELETS 10*3/mm3 287 262 228 202 186             Brief Urine Lab Results  (Last result in the past 365 days)        Color   Clarity   Blood   Leuk Est   Nitrite   Protein   CREAT   Urine HCG        12/08/24 1334 Yellow   Cloudy   Small (1+)   Negative   Negative   30 mg/dL (1+)                   No results found for: \"UTPCR\"    Imaging Results (Last 24 Hours)       ** No results found for the last 24 hours. **                Assessment / Plan     ASSESSMENT:  GORGE: Presented with sCr 6.84 on baseline ~1.2.  On initial presentation she was noted to be slightly hyponatremic and does have significantly low bicarb but electrolyte appears to be stable.  Likely pre-renal due to volume loss, hypovolemia as patient has been vomiting and having diarrhea.  It appears she was volume depleted and taking nonsteroidals likely contributing to significant worsening of renal function.  UA done initially did not show any significant casts.  CT AP without any signs of obstruction.   Gastroenteritis: Likely viral etiology. Supportive care.  Productive cough: Although WBC normal and afebrile, does have elevated procal and was placed on ABX as precaution.  Transaminitis: Etiology unclear. Hepatitis panel negative. Abdominal tenderness not near liver or gallbladder. Liver enzymes have significantly improved since presentation.  DM II: continue the sliding scale insulin.      PLAN:    Renal function appears to be slightly better this morning. If no significant improvement may have to consider dialysis, patient and family does understand will have to take day by day.  She responded well to the one time dose of IV lasix " yesterday. Due to trend of some renal improvement, will continue to hold off on any dialysis for now. However, if there is any worsening of renal function we will plan on getting tunneled dialysis catheter placement and initiating dialysis.  She is still appears to have some crackles and edema we will give another dose of diuretic today.  Strict intake and output. Avoid known nephrotoxic meds and IV contrast exposure if able.  Serum bicarb has normalized continue supplementation.  I will go ahead and decrease the dose to twice a day.  Remove Gross catheter.  Details were discussed with the patient as well as family in the room.    Details were also discussed with the hospitalist service and or other providers as needed.   Continue with rest of the current treatment plan, and monitor with surveillance labs.  Further recommendations will depend on clinical course of the patient during the current hospitalization.   I have reviewed the copied text to this note, it was edited and the changes made as needed.  It is accurate to the point, when the note was signed today.     Thank you for involving us in the care of Latonya SHANT Gomezs.  Please feel free to call with any questions.    SHELLY Yuan  12/13/24  07:53 EST    [facility]  [phone]      Part of this note may be an electronic transcription/translation of spoken language to printed text using the Dragon Dictation System.                   Electronically signed by Jamey Car MD, FASN at 12/13/24 1102       Consult Notes (last 48 hours)  Notes from 12/12/24 1534 through 12/14/24 1534   No notes of this type exist for this encounter.

## 2024-12-15 ENCOUNTER — READMISSION MANAGEMENT (OUTPATIENT)
Dept: CALL CENTER | Facility: HOSPITAL | Age: 74
End: 2024-12-15
Payer: MEDICARE

## 2024-12-15 VITALS
HEART RATE: 73 BPM | RESPIRATION RATE: 16 BRPM | DIASTOLIC BLOOD PRESSURE: 76 MMHG | HEIGHT: 66 IN | OXYGEN SATURATION: 93 % | WEIGHT: 147.49 LBS | BODY MASS INDEX: 23.7 KG/M2 | TEMPERATURE: 98.2 F | SYSTOLIC BLOOD PRESSURE: 132 MMHG

## 2024-12-15 LAB
ALBUMIN SERPL-MCNC: 3.8 G/DL (ref 3.5–5.2)
ALBUMIN/GLOB SERPL: 1.3 G/DL
ALP SERPL-CCNC: 82 U/L (ref 39–117)
ALT SERPL W P-5'-P-CCNC: 42 U/L (ref 1–33)
ANION GAP SERPL CALCULATED.3IONS-SCNC: 15.6 MMOL/L (ref 5–15)
AST SERPL-CCNC: 21 U/L (ref 1–32)
BASOPHILS # BLD AUTO: 0.04 10*3/MM3 (ref 0–0.2)
BASOPHILS NFR BLD AUTO: 0.7 % (ref 0–1.5)
BILIRUB SERPL-MCNC: 0.3 MG/DL (ref 0–1.2)
BUN SERPL-MCNC: 39 MG/DL (ref 8–23)
BUN/CREAT SERPL: 9.5 (ref 7–25)
CALCIUM SPEC-SCNC: 9.4 MG/DL (ref 8.6–10.5)
CHLORIDE SERPL-SCNC: 94 MMOL/L (ref 98–107)
CO2 SERPL-SCNC: 31.4 MMOL/L (ref 22–29)
CREAT SERPL-MCNC: 4.11 MG/DL (ref 0.57–1)
DEPRECATED RDW RBC AUTO: 45.1 FL (ref 37–54)
EGFRCR SERPLBLD CKD-EPI 2021: 10.7 ML/MIN/1.73
EOSINOPHIL # BLD AUTO: 0.18 10*3/MM3 (ref 0–0.4)
EOSINOPHIL NFR BLD AUTO: 3 % (ref 0.3–6.2)
ERYTHROCYTE [DISTWIDTH] IN BLOOD BY AUTOMATED COUNT: 13.3 % (ref 12.3–15.4)
GLOBULIN UR ELPH-MCNC: 2.9 GM/DL
GLUCOSE BLDC GLUCOMTR-MCNC: 140 MG/DL (ref 70–130)
GLUCOSE SERPL-MCNC: 197 MG/DL (ref 65–99)
HCT VFR BLD AUTO: 31.8 % (ref 34–46.6)
HGB BLD-MCNC: 10.5 G/DL (ref 12–15.9)
IMM GRANULOCYTES # BLD AUTO: 0.03 10*3/MM3 (ref 0–0.05)
IMM GRANULOCYTES NFR BLD AUTO: 0.5 % (ref 0–0.5)
LYMPHOCYTES # BLD AUTO: 1.4 10*3/MM3 (ref 0.7–3.1)
LYMPHOCYTES NFR BLD AUTO: 23.3 % (ref 19.6–45.3)
MCH RBC QN AUTO: 30.1 PG (ref 26.6–33)
MCHC RBC AUTO-ENTMCNC: 33 G/DL (ref 31.5–35.7)
MCV RBC AUTO: 91.1 FL (ref 79–97)
MONOCYTES # BLD AUTO: 0.42 10*3/MM3 (ref 0.1–0.9)
MONOCYTES NFR BLD AUTO: 7 % (ref 5–12)
NEUTROPHILS NFR BLD AUTO: 3.93 10*3/MM3 (ref 1.7–7)
NEUTROPHILS NFR BLD AUTO: 65.5 % (ref 42.7–76)
NRBC BLD AUTO-RTO: 0 /100 WBC (ref 0–0.2)
PLATELET # BLD AUTO: 396 10*3/MM3 (ref 140–450)
PMV BLD AUTO: 10 FL (ref 6–12)
POTASSIUM SERPL-SCNC: 3.9 MMOL/L (ref 3.5–5.2)
PROT SERPL-MCNC: 6.7 G/DL (ref 6–8.5)
RBC # BLD AUTO: 3.49 10*6/MM3 (ref 3.77–5.28)
SODIUM SERPL-SCNC: 141 MMOL/L (ref 136–145)
WBC NRBC COR # BLD AUTO: 6 10*3/MM3 (ref 3.4–10.8)

## 2024-12-15 PROCEDURE — 82948 REAGENT STRIP/BLOOD GLUCOSE: CPT

## 2024-12-15 PROCEDURE — 85025 COMPLETE CBC W/AUTO DIFF WBC: CPT | Performed by: STUDENT IN AN ORGANIZED HEALTH CARE EDUCATION/TRAINING PROGRAM

## 2024-12-15 PROCEDURE — 80053 COMPREHEN METABOLIC PANEL: CPT | Performed by: STUDENT IN AN ORGANIZED HEALTH CARE EDUCATION/TRAINING PROGRAM

## 2024-12-15 PROCEDURE — 25010000002 HEPARIN (PORCINE) PER 1000 UNITS: Performed by: INTERNAL MEDICINE

## 2024-12-15 PROCEDURE — 99239 HOSP IP/OBS DSCHRG MGMT >30: CPT | Performed by: STUDENT IN AN ORGANIZED HEALTH CARE EDUCATION/TRAINING PROGRAM

## 2024-12-15 RX ORDER — AMLODIPINE BESYLATE 10 MG/1
10 TABLET ORAL
Qty: 30 TABLET | Refills: 0 | Status: SHIPPED | OUTPATIENT
Start: 2024-12-16

## 2024-12-15 RX ORDER — OMEPRAZOLE 40 MG/1
40 CAPSULE, DELAYED RELEASE ORAL DAILY
Start: 2024-12-15

## 2024-12-15 RX ORDER — SPIRONOLACTONE 25 MG/1
25 TABLET ORAL DAILY
Qty: 30 TABLET | Refills: 0 | Status: SHIPPED | OUTPATIENT
Start: 2024-12-16

## 2024-12-15 RX ORDER — FUROSEMIDE 40 MG/1
40 TABLET ORAL DAILY
Qty: 30 TABLET | Refills: 0 | Status: SHIPPED | OUTPATIENT
Start: 2024-12-16

## 2024-12-15 RX ADMIN — SPIRONOLACTONE 25 MG: 25 TABLET, FILM COATED ORAL at 09:23

## 2024-12-15 RX ADMIN — ASPIRIN 81 MG CHEWABLE TABLET 81 MG: 81 TABLET CHEWABLE at 09:23

## 2024-12-15 RX ADMIN — FUROSEMIDE 40 MG: 40 TABLET ORAL at 09:23

## 2024-12-15 RX ADMIN — VENLAFAXINE HYDROCHLORIDE 37.5 MG: 37.5 CAPSULE, EXTENDED RELEASE ORAL at 09:23

## 2024-12-15 RX ADMIN — AMLODIPINE BESYLATE 10 MG: 5 TABLET ORAL at 09:23

## 2024-12-15 RX ADMIN — Medication 1 CAPSULE: at 09:23

## 2024-12-15 RX ADMIN — HEPARIN SODIUM 5000 UNITS: 5000 INJECTION INTRAVENOUS; SUBCUTANEOUS at 09:23

## 2024-12-15 RX ADMIN — Medication 10 ML: at 09:23

## 2024-12-15 RX ADMIN — LEVOTHYROXINE SODIUM 25 MCG: 0.03 TABLET ORAL at 06:04

## 2024-12-15 RX ADMIN — PANTOPRAZOLE SODIUM 40 MG: 40 TABLET, DELAYED RELEASE ORAL at 06:04

## 2024-12-15 NOTE — DISCHARGE SUMMARY
[facility]   DISCHARGE SUMMARY      Name:  Latonya Mei   Age:  74 y.o.  Sex:  female  :  1950  MRN:  [id number]   Visit Number:  [id number]    Admission Date:  2024  Date of Discharge:  12/15/2024  Primary Care Physician:  Chrystal Buckley APRN    Problem List:     Active Hospital Problems    Diagnosis  POA    **GORGE (acute kidney injury) [N17.9]  Yes    Gastroesophageal reflux disease [K21.9]  Yes    Type 2 diabetes mellitus without complication, without long-term current use of insulin [E11.9]  Yes      Resolved Hospital Problems   No resolved problems to display.     Presenting Problem:    Chief Complaint   Patient presents with    Pain With Breathing    Cough      Consults:     Consulting Physician(s)         Provider   Role Specialty     Jamey Car MD, FELICIAN      Consulting Physician Nephrology            History of presenting illness/Hospital Course:    Latonya Mei is a 74-year-old female with history of type 2 diabetes, hypothyroidism, GERD, fibromyalgia was brought to the emergency room by her daughter with multiple symptoms including chills, abdominal discomfort, nausea, vomiting, cough and shortness of breath.  Patient states that she has had chronic left knee joint pain due to torn meniscus.  She has been following up with Dr. Vazquez and apparently has been scheduled for surgery.  She has been taking 2 tablets of Motrin/Tylenol as needed for the last 3 weeks.  4 days ago, she started having epigastric pain nausea, vomiting, chills and low-grade fevers.  She called her primary care provider and apparently was seen by her and urine was sent.  She was placed on ciprofloxacin.  Patient however has not had any relief of symptoms and came to the emergency room.     Patient was last admitted to this facility in 2024 with similar complaints and at that time she was noted to have mild GORGE.  Due to shortness of breath, she was seen by cardiology and did  undergo cardiac catheterization which was negative for any significant coronary artery disease at that time.     In the emergency room, she was afebrile and hemodynamically stable saturating at 97% on room air.  Initial CK was within normal range.  Troponin 21, proBNP 4000, sodium 132, CO2 19, anion gap 19, BUN 52, creatinine 6.84 (baseline 1.2), glucose 168, alkaline phosphatase 147, , .  Lactic acid was within normal range but procalcitonin is elevated at 4.83.  CBC was unremarkable except for a hemoglobin of 11 (baseline).  Monospot test was negative.  Urine analysis shows 3-5 RBCs but otherwise negative for any evidence of urinary tract infection.  Respiratory panel was negative.  Chest x-ray showed no acute process.  CT of the abdomen and pelvis without contrast was negative for any hydronephrosis or nephrolithiasis.  It showed diverticulosis, benign left renal cyst.  EKG was unremarkable.  Patient was given 2 L of normal saline bolus, Zofran and Zosyn in the emergency room.  Blood cultures were done and acute hepatitis panel was sent.     Inpatient general floor admission 12/8/2024 with acute renal failure.  Due to elevated procalcitonin and cough, she was started on Rocephin empirically.  Clinically improved during course.    Stable for discharge home 12/15/2024.     GORGE  Discharged with Lasix and spironolactone.  Close follow-up with nephrology.  During course was close to starting dialysis per nephrology, fortunately did have steady improvement in renal function.  Likely secondary to NSAIDs and dehydration.  Strongly advised to discontinue NSAIDs.  CT of the abdomen negative for any hydronephrosis.  Hypertension  Discharged with amlodipine.    Cough  Elevated procalcitonin  No further antibiotics.  Completed 5-day course of Rocephin.  Blood cultures negative.  Transaminitis  Recommend follow-up outpatient.  Possibly secondary to dehydration initially.  Viral hepatitis panel negative.  No  specific right upper quadrant tenderness.     Chronic: type 2 diabetes, hypothyroidism, GERD, fibromyalgia  Continue other home medications.    Vital Signs:    Temp:  [98.2 °F (36.8 °C)-98.7 °F (37.1 °C)] 98.2 °F (36.8 °C)  Heart Rate:  [73-77] 73  Resp:  [15-18] 16  BP: (132-150)/(60-76) 132/76    Physical Exam:    General Appearance:  Alert and cooperative.    Head:  Atraumatic and normocephalic.   Eyes: Conjunctivae and sclerae normal, no icterus. No pallor.   Ears:  Ears with no abnormalities noted.   Throat: No oral lesions, no thrush, oral mucosa moist.   Neck: Supple, trachea midline, no thyromegaly.   Back:   No kyphoscoliosis present. No tenderness to palpation.   Lungs:   Breath sounds heard bilaterally equally.  No crackles or wheezing. No Pleural rub or bronchial breathing.   Heart:  Normal S1 and S2, no murmur, no gallop, no rub. No JVD.   Abdomen:   Normal bowel sounds, no masses, no organomegaly. Soft, nontender, nondistended, no rebound tenderness.   Extremities: Supple, no edema, no cyanosis, no clubbing.   Pulses: Pulses palpable bilaterally.   Skin: Warm.   Neurologic: Alert and oriented x 3. No facial asymmetry. Moves all four limbs. No tremors.     Pertinent Lab Results:     Results from last 7 days   Lab Units 12/15/24  0832 12/14/24  0647 12/13/24  0616   SODIUM mmol/L 141 142 142   POTASSIUM mmol/L 3.9 3.5 3.7   CHLORIDE mmol/L 94* 98 102   CO2 mmol/L 31.4* 32.5* 27.7   BUN mg/dL 39* 37* 36*   CREATININE mg/dL 4.11* 4.62* 5.03*   CALCIUM mg/dL 9.4 9.2 8.7   BILIRUBIN mg/dL 0.3 0.3 0.2   ALK PHOS U/L 82 91 85   ALT (SGPT) U/L 42* 56* 65*   AST (SGOT) U/L 21 19 18   GLUCOSE mg/dL 197* 134* 122*     Results from last 7 days   Lab Units 12/15/24  0832 12/14/24  0647 12/13/24  0616   WBC 10*3/mm3 6.00 6.23 5.90   HEMOGLOBIN g/dL 10.5* 10.6* 9.7*   HEMATOCRIT % 31.8* 31.7* 28.4*   PLATELETS 10*3/mm3 396 357 287         Results from last 7 days   Lab Units 12/08/24  1532 12/08/24  1333   CK TOTAL  U/L  --  113   HSTROP T ng/L 18* 21*     Results from last 7 days   Lab Units 12/08/24  1333   PROBNP pg/mL 4,009.0*                 Results from last 7 days   Lab Units 12/08/24  1515 12/08/24  1500   BLOODCX  No growth at 5 days No growth at 5 days       Pertinent Radiology Results:    Imaging Results (All)       Procedure Component Value Units Date/Time    CT Abdomen Pelvis Without Contrast [[id number]] Collected: 12/08/24 1447     Updated: 12/08/24 1454    Narrative:      PROCEDURE: CT ABDOMEN PELVIS WO CONTRAST-     HISTORY: abdominal pain, vomiting; N17.9-Acute kidney failure,  unspecified     COMPARISON: August 31, 2023..     PROCEDURE: Axial images were obtained from the lung bases to the pubic  symphysis by computed tomography. This study was performed with  techniques to keep radiation doses as low as reasonably achievable,  (ALARA). Individualized dose reduction techniques using automated  exposure control or adjustment of mA and/or kV according to the patient  size were employed.     FINDINGS:     ABDOMEN: The lung bases are clear. The heart is proper size. The limited  non-contrast images of the liver are unremarkable. Gallbladder present  with no CT visible stones. The spleen is unremarkable. No adrenal masses  are seen. The aorta is normal in caliber. There is no significant free  fluid or adenopathy.  There is no nephrolithiasis. There is no  hydronephrosis. There is an exophytic hypodense cyst arising from the  posterior aspect of the left kidney and a larger cyst arising from the  inferior pole of the left kidney. Vascular calcifications noted.  Gallbladder present with no CT visible stones.     PELVIS: The GI tract demonstrate no obstruction. The appendix is  questionably identified as a narrow tubular structure measuring 24 mm  arising from the posterior aspect of the cecum. No secondary signs of  appendicitis seen. The urinary bladder is partially collapsed with no  abnormality seen. Uterus is  diminutive or absent. There is  diverticulosis without evidence of diverticulitis. There is no fluid or  adenopathy.        Impression:      No hydronephrosis or nephrolithiasis.     Diverticulosis.     Benign left renal cyst.        CTDI: 5.55 mGy  DLP:247.9 mGy.cm     This report was signed and finalized on 12/8/2024 2:52 PM by Kim Romero MD.       XR Chest 2 View [[id number]] Collected: 12/08/24 1439     Updated: 12/08/24 1441    Narrative:      PROCEDURE: XR CHEST 2 VW-     HISTORY: cough; N17.9-Acute kidney failure, unspecified     COMPARISON: June 18, 2024..     FINDINGS: The heart is normal in size. The lungs are clear. The  mediastinum is unremarkable. There is no pneumothorax.  There are no  acute osseous abnormalities. Apical lordotic positioning noted.        Impression:      No acute cardiopulmonary process.              This report was signed and finalized on 12/8/2024 2:39 PM by Kim Romero MD.               Echo:    Results for orders placed during the hospital encounter of 06/18/24    Adult Transthoracic Echo Complete W/ Cont if Necessary Per Protocol    Interpretation Summary    Left ventricular systolic function is normal. Left ventricular ejection fraction appears to be 61 - 65%. Left ventricular diastolic function was normal.    The right ventricular cavity is borderline dilated with normal systolic function.    Moderate aortic valve regurgitation is present.    Condition on Discharge:      Stable.    Code status during the hospital stay:    Code Status and Medical Interventions: CPR (Attempt to Resuscitate); Full Support   Ordered at: 12/08/24 1602     Code Status (Patient has no pulse and is not breathing):    CPR (Attempt to Resuscitate)     Medical Interventions (Patient has pulse or is breathing):    Full Support     Discharge Disposition:    Home or Self Care    Discharge Medications:       Discharge Medications        New Medications        Instructions Start Date   amLODIPine 10 MG  tablet  Commonly known as: NORVASC   10 mg, Oral, Every 24 Hours Scheduled   Start Date: December 16, 2024     furosemide 40 MG tablet  Commonly known as: LASIX   40 mg, Oral, Daily   Start Date: December 16, 2024     melatonin 5 MG tablet tablet   5 mg, Oral, Nightly      spironolactone 25 MG tablet  Commonly known as: ALDACTONE   25 mg, Oral, Daily   Start Date: December 16, 2024            Changes to Medications        Instructions Start Date   omeprazole 40 MG capsule  Commonly known as: priLOSEC  What changed:   how much to take  how to take this  when to take this   40 mg, Oral, Daily, TAKE ONE CAPSULE BY MOUTH 30 MINUTES BEFORE BREAKFAST DAILY. Needs office visit for further refills.             Continue These Medications        Instructions Start Date   Accu-Chek FastClix Lancets misc   TEST 1-2 TIMES DAILY      aspirin 81 MG chewable tablet   81 mg, Daily      ezetimibe 10 MG tablet  Commonly known as: ZETIA   10 mg, Daily      glimepiride 2 MG tablet  Commonly known as: AMARYL       glucose blood test strip  Commonly known as: Accu-Chek Guide   Test 1-2 Times daily      levothyroxine 25 MCG tablet  Commonly known as: SYNTHROID, LEVOTHROID   25 mcg, Oral, Daily      metFORMIN 1000 MG tablet  Commonly known as: GLUCOPHAGE   1,000 mg, Oral, 2 Times Daily With Meals, HOLD x 2 days due to recent contrast      venlafaxine XR 75 MG 24 hr capsule  Commonly known as: EFFEXOR-XR   1 capsule Daily.      VITAMIN D (CHOLECALCIFEROL) PO   1,000 Units, Daily             Stop These Medications      rosuvastatin 40 MG tablet  Commonly known as: CRESTOR            Discharge Diet:     Diet Instructions       Diet: Diabetic Diets, Cardiac Diets; Healthy Heart (2-3 Na+); Regular (IDDSI 7); Thin (IDDSI 0); Consistent Carbohydrate      Discharge Diet:  Diabetic Diets  Cardiac Diets       Cardiac Diet: Healthy Heart (2-3 Na+)    Texture: Regular (IDDSI 7)    Fluid Consistency: Thin (IDDSI 0)    Diabetic Diet: Consistent  Carbohydrate          Activity at Discharge:     Activity Instructions       Activity as Tolerated            Follow-up Appointments:     Follow-up Information       Jamey Car MD, FASN Follow up.    Specialty: Nephrology  Why: per Dr Car, thursday the 19th  Contact information:  [address]  [place] [address]  [phone]               Chrystal Buckley APRN .    Specialty: Nurse Practitioner  Contact information:  [address]   [address]  [place] [address]  [phone]                           Future Appointments   Date Time Provider Department Center   12/30/2024  9:00 AM Santosh Veloz PA-C [facility]   2/21/2025 11:45 AM Glen Ríos MD [facility]   9/10/2025 10:40 AM Pao John MD [facility] [place] ([facility]     Test Results Pending at Discharge:    Pending Results       None                 Brian Joseph Kerley,   12/15/24  10:11 EST    Time: I spent 35 minutes on this discharge activity which included: face-to-face encounter with the patient, reviewing the data in the system, coordination of the care with the nursing staff as well as consultants, documentation, and entering orders.     Dictated utilizing Dragon dictation.    I have reviewed the copied text and it is accurate as of 12/15/2024

## 2024-12-15 NOTE — PLAN OF CARE
Goal Outcome Evaluation:  Plan of Care Reviewed With: patient        Progress: improving     VSS on RA. Sinus rhythm on telemetry. No acute events this shift. Patient hopeful for discharge home today.

## 2024-12-15 NOTE — CASE MANAGEMENT/SOCIAL WORK
Case Management Discharge Note      Final Note: Pt discharged home via private car. No needs at discharge.         Selected Continued Care - Discharged on 12/15/2024 Admission date: 12/8/2024 - Discharge disposition: Home or Self Care      Destination    No services have been selected for the patient.                Durable Medical Equipment    No services have been selected for the patient.                Dialysis/Infusion    No services have been selected for the patient.                Home Medical Care    No services have been selected for the patient.                Therapy    No services have been selected for the patient.                Community Resources    No services have been selected for the patient.                Community & DME    No services have been selected for the patient.                    Transportation Services  Private: Car    Final Discharge Disposition Code: 01 - home or self-care

## 2024-12-15 NOTE — PAYOR COMM NOTE
"To:  Yunier  From: Leda Ely RN  Phone: [phone]  Fax: [phone]  NPI: [id number]  TIN: [id number]  Member ID: [id number]   MRN: [id number]    Latonya Benavidez (74 y.o. Female)       Date of Birth   1950    Social Security Number       Address   [address] [place] [address]    Home Phone   [phone]    MRN   [id number]       P[employer]ism   Congregational    Marital Status                               Admission Date   12/8/24    Admission Type   Emergency    Admitting Provider   Juan Lovelace MD    Attending Provider       Department, Room/Bed   [facility] OB GYN, W210/1       Discharge Date   12/15/2024    Discharge Disposition   Home or Self Care    Discharge Destination   Home                              Attending Provider: (none)   Allergies: Dust Mite Extract, Grass, Latex, Pineapple, Rye, Tuberculin Tests, Wheat    Isolation: None   Infection: None   Code Status: Prior    Ht: 167.6 cm (65.98\")   Wt: 66.9 kg (147 lb 7.8 oz)    Admission Cmt: None   Principal Problem: GORGE (acute kidney injury) [N17.9]                   Active Insurance as of 12/8/2024       Primary Coverage       Payor Plan Insurance Group Employer/Plan Group    ANTH MEDICARE REPLACEMENT ANTHEM MED ADV HMO [id number]       Payor Plan Address Payor Plan Phone Number Payor Plan Fax Number Effective Dates    [address] [phone]  1/1/2024 - None Entered    [facility] [id number]         Subscriber Name Subscriber Birth Date Member ID       LATONYA BENAVIDEZ 1950 [id number]                     Emergency Contacts        (Rel.) Home Phone Work Phone Mobile Phone    HamidaDaniel kurtz (Spouse) [phone] -- --    Shahriar Daugherty (Son) [phone] -- [phone]    BROOK DAUGHERTY (Other) [phone] -- --                 Discharge Summary        Kerley, Brian Joseph, DO at 12/15/24 [facility]              [facility] HOSPITALIST   DISCHARGE SUMMARY      Name:  Latonya Benavidez   Age: "  74 y.o.  Sex:  female  :  1950  MRN:  [id number]   Visit Number:  [id number]    Admission Date:  2024  Date of Discharge:  12/15/2024  Primary Care Physician:  Chrystal Buckley APRN    Problem List:     Active Hospital Problems    Diagnosis  POA    **GORGE (acute kidney injury) [N17.9]  Yes    Gastroesophageal reflux disease [K21.9]  Yes    Type 2 diabetes mellitus without complication, without long-term current use of insulin [E11.9]  Yes      Resolved Hospital Problems   No resolved problems to display.     Presenting Problem:    Chief Complaint   Patient presents with    Pain With Breathing    Cough      Consults:     Consulting Physician(s)         Provider   Role Specialty     Jamey Car MD, NASIR      Consulting Physician Nephrology            History of presenting illness/Hospital Course:    Latonya Mei is a 74-year-old female with history of type 2 diabetes, hypothyroidism, GERD, fibromyalgia was brought to the emergency room by her daughter with multiple symptoms including chills, abdominal discomfort, nausea, vomiting, cough and shortness of breath.  Patient states that she has had chronic left knee joint pain due to torn meniscus.  She has been following up with Dr. Vazquez and apparently has been scheduled for surgery.  She has been taking 2 tablets of Motrin/Tylenol as needed for the last 3 weeks.  4 days ago, she started having epigastric pain nausea, vomiting, chills and low-grade fevers.  She called her primary care provider and apparently was seen by her and urine was sent.  She was placed on ciprofloxacin.  Patient however has not had any relief of symptoms and came to the emergency room.     Patient was last admitted to this facility in 2024 with similar complaints and at that time she was noted to have mild GORGE.  Due to shortness of breath, she was seen by cardiology and did undergo cardiac catheterization which was negative for any significant coronary artery disease  at that time.     In the emergency room, she was afebrile and hemodynamically stable saturating at 97% on room air.  Initial CK was within normal range.  Troponin 21, proBNP 4000, sodium 132, CO2 19, anion gap 19, BUN 52, creatinine 6.84 (baseline 1.2), glucose 168, alkaline phosphatase 147, , .  Lactic acid was within normal range but procalcitonin is elevated at 4.83.  CBC was unremarkable except for a hemoglobin of 11 (baseline).  Monospot test was negative.  Urine analysis shows 3-5 RBCs but otherwise negative for any evidence of urinary tract infection.  Respiratory panel was negative.  Chest x-ray showed no acute process.  CT of the abdomen and pelvis without contrast was negative for any hydronephrosis or nephrolithiasis.  It showed diverticulosis, benign left renal cyst.  EKG was unremarkable.  Patient was given 2 L of normal saline bolus, Zofran and Zosyn in the emergency room.  Blood cultures were done and acute hepatitis panel was sent.     Inpatient general floor admission 12/8/2024 with acute renal failure.  Due to elevated procalcitonin and cough, she was started on Rocephin empirically.  Clinically improved during course.    Stable for discharge home 12/15/2024.     GORGE  Discharged with Lasix and spironolactone.  Close follow-up with nephrology.  During course was close to starting dialysis per nephrology, fortunately did have steady improvement in renal function.  Likely secondary to NSAIDs and dehydration.  Strongly advised to discontinue NSAIDs.  CT of the abdomen negative for any hydronephrosis.  Hypertension  Discharged with amlodipine.    Cough  Elevated procalcitonin  No further antibiotics.  Completed 5-day course of Rocephin.  Blood cultures negative.  Transaminitis  Recommend follow-up outpatient.  Possibly secondary to dehydration initially.  Viral hepatitis panel negative.  No specific right upper quadrant tenderness.     Chronic: type 2 diabetes, hypothyroidism, GERD,  fibromyalgia  Continue other home medications.    Vital Signs:    Temp:  [98.2 °F (36.8 °C)-98.7 °F (37.1 °C)] 98.2 °F (36.8 °C)  Heart Rate:  [73-77] 73  Resp:  [15-18] 16  BP: (132-150)/(60-76) 132/76    Physical Exam:    General Appearance:  Alert and cooperative.    Head:  Atraumatic and normocephalic.   Eyes: Conjunctivae and sclerae normal, no icterus. No pallor.   Ears:  Ears with no abnormalities noted.   Throat: No oral lesions, no thrush, oral mucosa moist.   Neck: Supple, trachea midline, no thyromegaly.   Back:   No kyphoscoliosis present. No tenderness to palpation.   Lungs:   Breath sounds heard bilaterally equally.  No crackles or wheezing. No Pleural rub or bronchial breathing.   Heart:  Normal S1 and S2, no murmur, no gallop, no rub. No JVD.   Abdomen:   Normal bowel sounds, no masses, no organomegaly. Soft, nontender, nondistended, no rebound tenderness.   Extremities: Supple, no edema, no cyanosis, no clubbing.   Pulses: Pulses palpable bilaterally.   Skin: Warm.   Neurologic: Alert and oriented x 3. No facial asymmetry. Moves all four limbs. No tremors.     Pertinent Lab Results:     Results from last 7 days   Lab Units 12/15/24  0832 12/14/24  0647 12/13/24  0616   SODIUM mmol/L 141 142 142   POTASSIUM mmol/L 3.9 3.5 3.7   CHLORIDE mmol/L 94* 98 102   CO2 mmol/L 31.4* 32.5* 27.7   BUN mg/dL 39* 37* 36*   CREATININE mg/dL 4.11* 4.62* 5.03*   CALCIUM mg/dL 9.4 9.2 8.7   BILIRUBIN mg/dL 0.3 0.3 0.2   ALK PHOS U/L 82 91 85   ALT (SGPT) U/L 42* 56* 65*   AST (SGOT) U/L 21 19 18   GLUCOSE mg/dL 197* 134* 122*     Results from last 7 days   Lab Units 12/15/24  0832 12/14/24  0647 12/13/24  0616   WBC 10*3/mm3 6.00 6.23 5.90   HEMOGLOBIN g/dL 10.5* 10.6* 9.7*   HEMATOCRIT % 31.8* 31.7* 28.4*   PLATELETS 10*3/mm3 396 357 287         Results from last 7 days   Lab Units 12/08/24  1532 12/08/24  1333   CK TOTAL U/L  --  113   HSTROP T ng/L 18* 21*     Results from last 7 days   Lab Units 12/08/24  1333    PROBNP pg/mL 4,009.0*                 Results from last 7 days   Lab Units 12/08/24  1515 12/08/24  1500   BLOODCX  No growth at 5 days No growth at 5 days       Pertinent Radiology Results:    Imaging Results (All)       Procedure Component Value Units Date/Time    CT Abdomen Pelvis Without Contrast [[id number]] Collected: 12/08/24 1447     Updated: 12/08/24 1454    Narrative:      PROCEDURE: CT ABDOMEN PELVIS WO CONTRAST-     HISTORY: abdominal pain, vomiting; N17.9-Acute kidney failure,  unspecified     COMPARISON: August 31, 2023..     PROCEDURE: Axial images were obtained from the lung bases to the pubic  symphysis by computed tomography. This study was performed with  techniques to keep radiation doses as low as reasonably achievable,  (ALARA). Individualized dose reduction techniques using automated  exposure control or adjustment of mA and/or kV according to the patient  size were employed.     FINDINGS:     ABDOMEN: The lung bases are clear. The heart is proper size. The limited  non-contrast images of the liver are unremarkable. Gallbladder present  with no CT visible stones. The spleen is unremarkable. No adrenal masses  are seen. The aorta is normal in caliber. There is no significant free  fluid or adenopathy.  There is no nephrolithiasis. There is no  hydronephrosis. There is an exophytic hypodense cyst arising from the  posterior aspect of the left kidney and a larger cyst arising from the  inferior pole of the left kidney. Vascular calcifications noted.  Gallbladder present with no CT visible stones.     PELVIS: The GI tract demonstrate no obstruction. The appendix is  questionably identified as a narrow tubular structure measuring 24 mm  arising from the posterior aspect of the cecum. No secondary signs of  appendicitis seen. The urinary bladder is partially collapsed with no  abnormality seen. Uterus is diminutive or absent. There is  diverticulosis without evidence of diverticulitis. There is no  fluid or  adenopathy.        Impression:      No hydronephrosis or nephrolithiasis.     Diverticulosis.     Benign left renal cyst.        CTDI: 5.55 mGy  DLP:247.9 mGy.cm     This report was signed and finalized on 12/8/2024 2:52 PM by Kim Romero MD.       XR Chest 2 View [[id number]] Collected: 12/08/24 1439     Updated: 12/08/24 1441    Narrative:      PROCEDURE: XR CHEST 2 VW-     HISTORY: cough; N17.9-Acute kidney failure, unspecified     COMPARISON: June 18, 2024..     FINDINGS: The heart is normal in size. The lungs are clear. The  mediastinum is unremarkable. There is no pneumothorax.  There are no  acute osseous abnormalities. Apical lordotic positioning noted.        Impression:      No acute cardiopulmonary process.              This report was signed and finalized on 12/8/2024 2:39 PM by Kim Romero MD.               Echo:    Results for orders placed during the hospital encounter of 06/18/24    Adult Transthoracic Echo Complete W/ Cont if Necessary Per Protocol    Interpretation Summary    Left ventricular systolic function is normal. Left ventricular ejection fraction appears to be 61 - 65%. Left ventricular diastolic function was normal.    The right ventricular cavity is borderline dilated with normal systolic function.    Moderate aortic valve regurgitation is present.    Condition on Discharge:      Stable.    Code status during the hospital stay:    Code Status and Medical Interventions: CPR (Attempt to Resuscitate); Full Support   Ordered at: 12/08/24 1602     Code Status (Patient has no pulse and is not breathing):    CPR (Attempt to Resuscitate)     Medical Interventions (Patient has pulse or is breathing):    Full Support     Discharge Disposition:    Home or Self Care    Discharge Medications:       Discharge Medications        New Medications        Instructions Start Date   amLODIPine 10 MG tablet  Commonly known as: NORVASC   10 mg, Oral, Every 24 Hours Scheduled   Start Date: December  16, 2024     furosemide 40 MG tablet  Commonly known as: LASIX   40 mg, Oral, Daily   Start Date: December 16, 2024     melatonin 5 MG tablet tablet   5 mg, Oral, Nightly      spironolactone 25 MG tablet  Commonly known as: ALDACTONE   25 mg, Oral, Daily   Start Date: December 16, 2024            Changes to Medications        Instructions Start Date   omeprazole 40 MG capsule  Commonly known as: priLOSEC  What changed:   how much to take  how to take this  when to take this   40 mg, Oral, Daily, TAKE ONE CAPSULE BY MOUTH 30 MINUTES BEFORE BREAKFAST DAILY. Needs office visit for further refills.             Continue These Medications        Instructions Start Date   Accu-Chek FastClix Lancets misc   TEST 1-2 TIMES DAILY      aspirin 81 MG chewable tablet   81 mg, Daily      ezetimibe 10 MG tablet  Commonly known as: ZETIA   10 mg, Daily      glimepiride 2 MG tablet  Commonly known as: AMARYL       glucose blood test strip  Commonly known as: Accu-Chek Guide   Test 1-2 Times daily      levothyroxine 25 MCG tablet  Commonly known as: SYNTHROID, LEVOTHROID   25 mcg, Oral, Daily      metFORMIN 1000 MG tablet  Commonly known as: GLUCOPHAGE   1,000 mg, Oral, 2 Times Daily With Meals, HOLD x 2 days due to recent contrast      venlafaxine XR 75 MG 24 hr capsule  Commonly known as: EFFEXOR-XR   1 capsule Daily.      VITAMIN D (CHOLECALCIFEROL) PO   1,000 Units, Daily             Stop These Medications      rosuvastatin 40 MG tablet  Commonly known as: CRESTOR            Discharge Diet:     Diet Instructions       Diet: Diabetic Diets, Cardiac Diets; Healthy Heart (2-3 Na+); Regular (IDDSI 7); Thin (IDDSI 0); Consistent Carbohydrate      Discharge Diet:  Diabetic Diets  Cardiac Diets       Cardiac Diet: Healthy Heart (2-3 Na+)    Texture: Regular (IDDSI 7)    Fluid Consistency: Thin (IDDSI 0)    Diabetic Diet: Consistent Carbohydrate          Activity at Discharge:     Activity Instructions       Activity as Tolerated             Follow-up Appointments:     Follow-up Information       Jamey Car MD, FASN Follow up.    Specialty: Nephrology  Why: per Dr Car, thursday the 19th  Contact information:  [address]  [place] [address]  [phone]               Chrystal Buckley APRN .    Specialty: Nurse Practitioner  Contact information:  [address]  [address]  [place] [address]  [phone]                           Future Appointments   Date Time Provider Department Center   12/30/2024  9:00 AM Santosh Veloz PA-C [facility]   2/21/2025 11:45 AM Glen Ríos MD [facility] CD BG [facility]   9/10/2025 10:40 AM Pao John MD [facility] GS [facility]re (Cl     Test Results Pending at Discharge:    Pending Results       None                 Brian Joseph Kerley, DO  12/15/24  10:11 EST    Time: I spent 35 minutes on this discharge activity which included: face-to-face encounter with the patient, reviewing the data in the system, coordination of the care with the nursing staff as well as consultants, documentation, and entering orders.     Dictated utilizing Dragon dictation.    I have reviewed the copied text and it is accurate as of 12/15/2024       Electronically signed by Kerley, Brian Joseph, DO at 12/15/24 1015

## 2024-12-15 NOTE — PROGRESS NOTES
[facility] Progress Note  [facility]. [place]        Patient Name: Latonya Mei  : 1950  MRN: [id number]   LOS: 7 days    Patient Care Team:  Chrystal Buckley APRN as PCP - General (Nurse Practitioner)  Pao John MD as Consulting Physician (General Surgery)  Glen Ríos MD as Consulting Physician (Cardiology)  Nia García APRN as Nurse Practitioner (Gastroenterology)    Chief Complaint:    Chief Complaint   Patient presents with    Pain With Breathing    Cough     Primary Care Physician:  Chrystal Buckley APRN  Date of admission: 2024    Subjective     Interval History:   Follow-up acute kidney injury.  She is lying in the bed awake alert and interactive.  She does complain of intermittent shortness of breath but denies any chest pain.  She responded well to diuretics yesterday with over 5L UOP. Volume status seems improved.   Events noted from last 24 hours.  I reviewed the chart and other providers notes, labs and procedures done since my last note.    Review of Systems:   As noted above.    Objective     Vitals:   Temp:  [98.2 °F (36.8 °C)-98.4 °F (36.9 °C)] 98.2 °F (36.8 °C)  Resp:  [15-18] 16  BP: (132-150)/(60-76) 132/76    Intake/Output Summary (Last 24 hours) at 12/15/2024 1541  Last data filed at 12/15/2024 0810  Gross per 24 hour   Intake 720 ml   Output 1800 ml   Net -1080 ml       Physical Exam:    Constitutional: Awake, alert  Eyes: sclerae anicteric, no conjunctival injection  HEENT: mucous membranes dry  Neck: Supple, no thyromegaly, no lymphadenopathy, trachea midline, No JVD  Respiratory: Crackles noted half of the chest today.  Cardiovascular: RRR, no murmurs, rubs, or gallops.  Gastrointestinal: Positive bowel sounds, obese, soft, + tender around epigastric area on palpation  Musculoskeletal: 1+ edema, no clubbing or cyanosis  Psychiatric: Appropriate affect, cooperative  Neurologic: Oriented x 3, moving all  extremities, Cranial Nerves grossly intact, speech clear  Skin: warm and dry, no rashes     Scheduled Meds:     No current facility-administered medications for this encounter.     Current Outpatient Medications   Medication Sig Dispense Refill    [START ON 12/16/2024] amLODIPine (NORVASC) 10 MG tablet Take 1 tablet by mouth Daily. 30 tablet 0    [START ON 12/16/2024] furosemide (LASIX) 40 MG tablet Take 1 tablet by mouth Daily. 30 tablet 0    melatonin 5 MG tablet tablet Take 1 tablet by mouth Every Night. 30 each 0    omeprazole (priLOSEC) 40 MG capsule Take 1 capsule by mouth Daily. TAKE ONE CAPSULE BY MOUTH 30 MINUTES BEFORE BREAKFAST DAILY. Needs office visit for further refills.      [START ON 12/16/2024] spironolactone (ALDACTONE) 25 MG tablet Take 1 tablet by mouth Daily. 30 tablet 0    ACCU-CHEK FASTCLIX LANCETS misc TEST 1-2 TIMES DAILY 50 each 12    aspirin 81 MG chewable tablet Chew 1 tablet Daily.      ezetimibe (ZETIA) 10 MG tablet Take 1 tablet by mouth Daily.      glimepiride (AMARYL) 2 MG tablet       glucose blood (ACCU-CHEK GUIDE) test strip Test 1-2 Times daily 50 each 12    levothyroxine (SYNTHROID, LEVOTHROID) 25 MCG tablet Take 1 tablet by mouth Daily. 90 tablet 2    metFORMIN (GLUCOPHAGE) 1000 MG tablet Take 1 tablet by mouth 2 (Two) Times a Day With Meals. HOLD x 2 days due to recent contrast      venlafaxine XR (EFFEXOR-XR) 75 MG 24 hr capsule 1 capsule Daily.      VITAMIN D, CHOLECALCIFEROL, PO Take 1,000 Units by mouth Daily.               IV Meds:   No current facility-administered medications for this encounter.        Results Reviewed:   I have personally reviewed the results from the time of this admission to 12/15/2024 15:41 EST     Results from last 7 days   Lab Units 12/15/24  0832 12/14/24  0647 12/13/24  0616   SODIUM mmol/L 141 142 142   POTASSIUM mmol/L 3.9 3.5 3.7   CHLORIDE mmol/L 94* 98 102   CO2 mmol/L 31.4* 32.5* 27.7   BUN mg/dL 39* 37* 36*   CREATININE mg/dL 4.11* 4.62*  "5.03*   CALCIUM mg/dL 9.4 9.2 8.7   BILIRUBIN mg/dL 0.3 0.3 0.2   ALK PHOS U/L 82 91 85   ALT (SGPT) U/L 42* 56* 65*   AST (SGOT) U/L 21 19 18   GLUCOSE mg/dL 197* 134* 122*       Estimated Creatinine Clearance: 12.3 mL/min (A) (by C-G formula based on SCr of 4.11 mg/dL (H)).                Results from last 7 days   Lab Units 12/15/24  0832 12/14/24  0647 12/13/24  0616 12/12/24  0643 12/11/24  0726   WBC 10*3/mm3 6.00 6.23 5.90 7.18 6.65   HEMOGLOBIN g/dL 10.5* 10.6* 9.7* 9.4* 9.4*   PLATELETS 10*3/mm3 396 357 287 262 228             Brief Urine Lab Results  (Last result in the past 365 days)        Color   Clarity   Blood   Leuk Est   Nitrite   Protein   CREAT   Urine HCG        12/08/24 1334 Yellow   Cloudy   Small (1+)   Negative   Negative   30 mg/dL (1+)                   No results found for: \"UTPCR\"    Imaging Results (Last 24 Hours)       ** No results found for the last 24 hours. **                Assessment / Plan     ASSESSMENT:  GORGE: Presented with sCr 6.84 on baseline ~1.2.  On initial presentation she was noted to be slightly hyponatremic and does have significantly low bicarb but electrolyte appears to be stable.  Likely pre-renal due to volume loss, hypovolemia as patient has been vomiting and having diarrhea.  It appears she was volume depleted and taking nonsteroidals likely contributing to significant worsening of renal function.  UA done initially did not show any significant casts.  CT AP without any signs of obstruction.  Last 5 days renal function has been continuously improving.  Gastroenteritis: Likely viral etiology. Supportive care.  It appears to have resolved.  Productive cough: Although WBC normal and afebrile, does have elevated procal and was placed on ABX as precaution.  Transaminitis: Etiology unclear. Hepatitis panel negative. Abdominal tenderness not near liver or gallbladder. Liver enzymes have significantly improved since presentation.  DM II: continue the sliding scale " insulin.      PLAN:    Renal function appears to be slightly better again this morning.  I doubt at this point that she will need dialysis.  She still has some extra volume will go ahead and put her on 40 of Lasix every day.  Potassium is on the low side we will go ahead and add spironolactone 25 mg a day as well.  Strict intake and output. Avoid known nephrotoxic meds and IV contrast exposure if able.  We will try to get her into the office within next 4 to 5 days to recheck labs.  Details were discussed with the patient no family in the room.    Details were also discussed with the hospitalist service and or other providers as needed.   Continue with rest of the current treatment plan, and monitor with surveillance labs.  Further recommendations will depend on clinical course of the patient during the current hospitalization.   I have reviewed the copied text to this note, it was edited and the changes made as needed.  It is accurate to the point, when the note was signed today.     Thank you for involving us in the care of Latonyakristofer Gomezs.  Please feel free to call with any questions.    Jamey Car MD, FASN  12/15/24  15:41 EST    [facility]  [phone]      Part of this note may be an electronic transcription/translation of spoken language to printed text using the Dragon Dictation System.

## 2024-12-16 NOTE — OUTREACH NOTE
Prep Survey      Flowsheet Row Responses   Mandaen facility patient discharged from? [place]   Is LACE score < 7 ? No   Eligibility Readm Mgmt   Discharge diagnosis GORGE (acute kidney injury)   Does the patient have one of the following disease processes/diagnoses(primary or secondary)? Other   Does the patient have Home health ordered? No   Is there a DME ordered? No   Prep survey completed? Yes            Sera GONZALES - Registered Nurse

## 2024-12-17 ENCOUNTER — TELEPHONE (OUTPATIENT)
Dept: ORTHOPEDIC SURGERY | Facility: CLINIC | Age: 74
End: 2024-12-17

## 2024-12-17 NOTE — TELEPHONE ENCOUNTER
HUB AGENT ATTEMPTED CLINICAL WARM TRANSFER -  ANSWERED & REQUESTED TEL ENC BE SENT     Caller: Latonya Mei    Relationship to patient: Self    Best call back number: [phone]    PATIENT AWARE OF 12/30 PRE-OP APPT w/MARILIN Pro WAS @ [facility] 12/08/24 FOR RENAL FAILURE     PLEASE CALL TO DISCUSS KEEPING PRE-OP APPT / LEFT KNEE SURGERY OR IF NEED TO RESCHEDULE?     THANKS

## 2024-12-18 ENCOUNTER — READMISSION MANAGEMENT (OUTPATIENT)
Dept: CALL CENTER | Facility: HOSPITAL | Age: 74
End: 2024-12-18
Payer: MEDICARE

## 2024-12-18 NOTE — OUTREACH NOTE
Medical Week 1 Survey      Flowsheet Row Responses   [facility] facility patient discharged from? [facility]   Does the patient have one of the following disease processes/diagnoses(primary or secondary)? Other   Week 1 attempt successful? No   Unsuccessful attempts Attempt 1            Maru DOWNS - Registered Nurse

## 2025-02-05 ENCOUNTER — READMISSION MANAGEMENT (OUTPATIENT)
Dept: CALL CENTER | Facility: HOSPITAL | Age: 75
End: 2025-02-05
Payer: MEDICARE

## 2025-02-05 NOTE — OUTREACH NOTE
Medical Week 3 Survey      Flowsheet Row Responses   [facility] patient discharged from? [facility]   Does the patient have one of the following disease processes/diagnoses(primary or secondary)? Other   Week 3 attempt successful? Yes   Call start time 1556   Call end time 1600   Person spoke with today (if not patient) and relationship spouse   Meds reviewed with patient/caregiver? Yes   Is the patient taking all medications as directed (includes completed medication regime)? Yes   Comments regarding appointments Patient saw Dr. Black last week per spouse.  Provider ordered a monitor for patient.   Does the patient have a primary care provider?  Yes   Comments regarding PCP Chrystal Buckley   Does the patient have an appointment with their PCP within 7 days of discharge? Yes   Has the patient kept scheduled appointments due by today? Yes   Psychosocial issues? No   Did the patient receive a copy of their discharge instructions? Yes   Nursing interventions Reviewed instructions with patient   What is the patient's perception of their health status since discharge? Improving   If the patient is a current smoker, are they able to teach back resources for cessation? Not a smoker   Week 3 Call Completed? Yes   Graduated Yes   Is the patient interested in additional calls from an ambulatory ? No   Would this patient benefit from a Referral to [facility] Social Work? No   Wrap up additional comments Spouse reports patient is doing ok at this time.  Denies needs.   Call end time 1600            BRAEDEN BRAN - Registered Nurse

## 2025-03-13 ENCOUNTER — TRANSCRIBE ORDERS (OUTPATIENT)
Dept: ADMINISTRATIVE | Facility: HOSPITAL | Age: 75
End: 2025-03-13
Payer: MEDICARE

## 2025-03-13 DIAGNOSIS — Z12.31 VISIT FOR SCREENING MAMMOGRAM: Primary | ICD-10-CM

## 2025-05-27 ENCOUNTER — TRANSCRIBE ORDERS (OUTPATIENT)
Dept: ADMINISTRATIVE | Facility: HOSPITAL | Age: 75
End: 2025-05-27
Payer: MEDICARE

## 2025-05-27 DIAGNOSIS — Z78.0 ASYMPTOMATIC AGE-RELATED POSTMENOPAUSAL STATE: Primary | ICD-10-CM

## 2025-06-25 ENCOUNTER — HOSPITAL ENCOUNTER (OUTPATIENT)
Dept: MAMMOGRAPHY | Facility: HOSPITAL | Age: 75
Discharge: HOME | End: 2025-06-25
Admitting: NURSE PRACTITIONER
Payer: MEDICARE

## 2025-06-25 DIAGNOSIS — Z12.31 VISIT FOR SCREENING MAMMOGRAM: ICD-10-CM

## 2025-06-25 PROCEDURE — 77067 SCR MAMMO BI INCL CAD: CPT

## 2025-06-25 PROCEDURE — 77063 BREAST TOMOSYNTHESIS BI: CPT

## 2025-08-04 ENCOUNTER — APPOINTMENT (OUTPATIENT)
Dept: BONE DENSITY | Facility: HOSPITAL | Age: 75
End: 2025-08-04
Payer: MEDICARE

## 2025-08-04 DIAGNOSIS — Z78.0 ASYMPTOMATIC AGE-RELATED POSTMENOPAUSAL STATE: ICD-10-CM

## 2025-08-04 PROCEDURE — 77080 DXA BONE DENSITY AXIAL: CPT

## (undated) DEVICE — HYBRID TUBING/CAP SET FOR OLYMPUS® SCOPES: Brand: ERBE

## (undated) DEVICE — CATH DIAG EXPO .052 PIG145 6F 110CM

## (undated) DEVICE — Device

## (undated) DEVICE — FLEXIBLE YANKAUER,MEDIUM TIP, NO VACUUM CONTROL: Brand: ARGYLE

## (undated) DEVICE — PAD GRND REM POLYHESIVE A/ DISP

## (undated) DEVICE — SUT VIC 3/0 SH 27IN J416H

## (undated) DEVICE — COUNT NDL FOAM STRIP W/MAG 60CT

## (undated) DEVICE — SYRINGE KIT,PACKAGED,,150FT,MK 7(ANGIO-ARTERION, 150ML SYR KIT W/QFT,MC)(60729385): Brand: MEDRAD® MARK 7 ARTERION DISPOSABLE SYRINGE 150 ML WITH QUICK FILL TUBE

## (undated) DEVICE — KITTNER SPONGE: Brand: DEROYAL

## (undated) DEVICE — SHEET,DRAPE,70X100,STERILE: Brand: MEDLINE

## (undated) DEVICE — DGW .035 FC J3MM 260CM TEF: Brand: EMERALD

## (undated) DEVICE — STRIP,CLOSURE,WOUND,MEDI-STRIP,1/2X4: Brand: MEDLINE

## (undated) DEVICE — GLV SURG TRIUMPH MICRO PF LTX 7.5 STRL

## (undated) DEVICE — TR BAND RADIAL ARTERY COMPRESSION DEVICE: Brand: TR BAND

## (undated) DEVICE — UNDYED BRAIDED (POLYGLACTIN 910), SYNTHETIC ABSORBABLE SUTURE: Brand: COATED VICRYL

## (undated) DEVICE — 2000CC GUARDIAN II: Brand: GUARDIAN

## (undated) DEVICE — GLIDESHEATH SLENDER STAINLESS STEEL KIT: Brand: GLIDESHEATH SLENDER

## (undated) DEVICE — FRCP BX RADJAW4 NDL 2.8 240 STD OG

## (undated) DEVICE — LUBE JELLY PK/2.75GM STRL BX/144

## (undated) DEVICE — GOWN,PREVENTION PLUS,XLARGE,STERILE: Brand: MEDLINE

## (undated) DEVICE — CONMED SCOPE SAVER BITE BLOCK, 20X27 MM: Brand: SCOPE SAVER

## (undated) DEVICE — SPNG LAP 18X18IN LF STRL PK/5

## (undated) DEVICE — GOWN,SIRUS,NON REINFRCD,LARGE,SET IN SL: Brand: MEDLINE

## (undated) DEVICE — DBD-PACK,EENT,SIRUS,PK II: Brand: MEDLINE

## (undated) DEVICE — ENDOSCOPY PORT CONNECTOR FOR OLYMPUS® SCOPES: Brand: ERBE

## (undated) DEVICE — DRSNG WND GZ PAD BORDERED LF 4X5IN STRL

## (undated) DEVICE — HARMONIC FOCUS SHEARS 9CM LENGTH + ADAPTIVE TISSUE TECHNOLOGY FOR USE WITH BLUE HAND PIECE ONLY: Brand: HARMONIC FOCUS

## (undated) DEVICE — MARKR SKIN W/RULR

## (undated) DEVICE — 1000 S-DRAPE TOWEL DRAPE 10/BX: Brand: STERI-DRAPE™

## (undated) DEVICE — RADIFOCUS OPTITORQUE ANGIOGRAPHIC CATHETER: Brand: OPTITORQUE

## (undated) DEVICE — CVR HNDL LIGHT RIGID

## (undated) DEVICE — VLV SXN AIR/H2O ORCAPOD3 1P/U STRL

## (undated) DEVICE — SUT SILK 3/0 SH CR8 18IN C013D

## (undated) DEVICE — IRRIGATOR BULB ASEPTO 60CC STRL

## (undated) DEVICE — FRCP BIOP COLD ENDOJAW ALLGTR W/NDL 2.8X2300MM BLU

## (undated) DEVICE — SPNG GZ WOVN 4X4IN 12PLY 10/BX STRL

## (undated) DEVICE — CUFF SCD HEMOFORCE SEQ CALF STD MD

## (undated) DEVICE — APPL CHLORAPREP W/TINT 10.5ML ORNG

## (undated) DEVICE — GW INQWIRE FC PTFE STD J/1.5 .035 260

## (undated) DEVICE — GAUZE,SPONGE,4"X4",16PLY,XRAY,STRL,LF: Brand: MEDLINE

## (undated) DEVICE — Device: Brand: DEFENDO AIR/WATER/SUCTION AND BIOPSY VALVE

## (undated) DEVICE — SUT SILK PERMA HAND 4/0 12-30IN BRAID BLK A303H

## (undated) DEVICE — CATH F6 ST JR 4 100CM: Brand: SUPERTORQUE

## (undated) DEVICE — INTENDED FOR TISSUE SEPARATION, AND OTHER PROCEDURES THAT REQUIRE A SHARP SURGICAL BLADE TO PUNCTURE OR CUT.: Brand: BARD-PARKER ® CARBON RIB-BACK BLADES

## (undated) DEVICE — TOWEL,OR,DSP,ST,BLUE,STD,4/PK,20PK/CS: Brand: MEDLINE

## (undated) DEVICE — ENDOGATOR AUXILIARY WATER JET CONNECTOR: Brand: ENDOGATOR